# Patient Record
Sex: MALE | Race: WHITE | NOT HISPANIC OR LATINO | Employment: FULL TIME | URBAN - METROPOLITAN AREA
[De-identification: names, ages, dates, MRNs, and addresses within clinical notes are randomized per-mention and may not be internally consistent; named-entity substitution may affect disease eponyms.]

---

## 2017-01-18 ENCOUNTER — OFFICE VISIT (OUTPATIENT)
Dept: MEDICAL GROUP | Facility: MEDICAL CENTER | Age: 42
End: 2017-01-18
Payer: COMMERCIAL

## 2017-01-18 VITALS
DIASTOLIC BLOOD PRESSURE: 90 MMHG | BODY MASS INDEX: 30.78 KG/M2 | OXYGEN SATURATION: 97 % | HEART RATE: 89 BPM | WEIGHT: 215 LBS | TEMPERATURE: 96.6 F | HEIGHT: 70 IN | SYSTOLIC BLOOD PRESSURE: 140 MMHG

## 2017-01-18 DIAGNOSIS — I10 ESSENTIAL HYPERTENSION: ICD-10-CM

## 2017-01-18 DIAGNOSIS — J01.80 OTHER ACUTE SINUSITIS: ICD-10-CM

## 2017-01-18 PROCEDURE — 99214 OFFICE O/P EST MOD 30 MIN: CPT | Performed by: NURSE PRACTITIONER

## 2017-01-18 RX ORDER — LISINOPRIL 10 MG/1
10 TABLET ORAL DAILY
Qty: 30 TAB | Refills: 1 | Status: SHIPPED | OUTPATIENT
Start: 2017-01-18 | End: 2017-04-20

## 2017-01-18 RX ORDER — AMOXICILLIN AND CLAVULANATE POTASSIUM 875; 125 MG/1; MG/1
1 TABLET, FILM COATED ORAL 2 TIMES DAILY
Qty: 20 TAB | Refills: 0 | Status: SHIPPED | OUTPATIENT
Start: 2017-01-18 | End: 2017-02-14

## 2017-01-18 NOTE — PROGRESS NOTES
Subjective:      Ramsey Allen is a 41 y.o. male who presents with HTN          HPI  Seen in f/u for HTN.  He had his wisdom teeth out 12 days ago.  Before that he had a sinusitis.  Took amoxicillin x 5 days.  Didn't help.  He is still having yellow green secretions.  + sinus congestion.  More sweating but not fever.  + headache.  noow going to chest congestion.  + coughing.  Waking up with dry sinuses.  Still sore throat that is sl better.    His bp is still elevated.  He checks it at store and its higher.    He was working on diet and exercise well until the last month.  Then had lots of stress with illness and family deaths.      Patient Active Problem List    Diagnosis Date Noted   • Obesity (BMI 30-39.9) 12/14/2016   • Left-sided tinnitus 11/11/2016   • Bilateral headache 07/13/2015   • Viral syndrome 07/01/2015   • Vitamin d deficiency 03/20/2012   • Preventative health care 09/21/2011   • Recurrent sinusitis    • Post traumatic stress disorder (PTSD)    • Low back pain 07/08/2010   • HTN (hypertension) 04/28/2010   • Anxiety 10/21/2009   • Erectile dysfunction 08/04/2009     Current Outpatient Prescriptions   Medication Sig Dispense Refill   • vitamin D, Ergocalciferol, (DRISDOL) 93996 UNITS Cap capsule Take 1 Cap by mouth every 7 days. 12 Cap 0   • sertraline (ZOLOFT) 50 MG Tab Take 1 Tab by mouth every day. 90 Tab 1   • fluticasone (FLONASE) 50 MCG/ACT nasal spray Spray 1 Spray in nose every day. 16 g 11   • Loratadine (CLARITIN) 10 MG CAPS Take  by mouth.       No current facility-administered medications for this visit.     Allergies   Allergen Reactions   • Erythromycin    • Ilosone [Erythromycin Estolate]        ROS    Review of Systems   Constitutional: Negative.  Negative for fever, chills, weight loss, malaise/fatigue and diaphoresis.   HENT: Negative.  Negative for hearing loss, ear pain, nosebleeds, congestion, sore throat, neck pain, tinnitus and ear discharge.    Respiratory: Negative.   "Negative for cough, hemoptysis, sputum production, shortness of breath, wheezing and stridor.    Cardiovascular: Negative.  Negative for chest pain, palpitations, orthopnea, claudication, leg swelling and PND.   Gastrointestinal: denies nausea, vomiting, diarrhea, constipation, heartburn, melena or hematochezia.  Genitourinary: Denies dysuria, hematuria, urinary incontinence, frequency or urgency.    Musculoskeletal: Negative.  Negative for myalgias and back pain.   Neurological: Negative.  Negative for dizziness, tingling, tremors, weakness and headaches.   Psych:  Denies depression, anxiety or insomnia.  All other systems reviewed and are negative.         Objective:     /90 mmHg  Pulse 89  Temp(Src) 35.9 °C (96.6 °F)  Ht 1.778 m (5' 10\")  Wt 97.523 kg (215 lb)  BMI 30.85 kg/m2  SpO2 97%     Physical Exam  Physical Exam   Vitals reviewed.  Constitutional: oriented to person, place, and time. appears well-developed and well-nourished. No distress.   HENT:  Head: Normocephalic and atraumatic. Right Ear: External ear normal. Left Ear: External ear normal. Nose: Nose normal. Mouth/Throat: Oropharynx is clear and moist. No oropharyngeal exudate.  otoniel tm wnl.  Eyes: Right eye exhibits no discharge. Left eye exhibits no discharge. No scleral icterus.  Neck: No JVD present.  Cardiovascular: Normal rate, regular rhythm, normal heart sounds and intact distal pulses.  Exam reveals no gallop and no friction rub.  No murmur heard.  No carotid bruits.   Pulmonary/Chest: Effort normal and breath sounds normal. No stridor. No respiratory distress. no wheezes or rales. exhibits no tenderness.   Musculoskeletal: Normal range of motion. exhibits no edema. otoniel pedal pulses 2+.  Lymphadenopathy: no cervical or supraclavicular adenopathy.   Neurological: alert and oriented to person, place, and time. exhibits normal muscle tone. Coordination normal.   Skin: Skin is warm and dry. no diaphoresis.   Psychiatric: normal mood " and affect. behavior is normal.               Assessment/Plan:     1. Essential hypertension  lisinopril (PRINIVIL) 10 MG Tab    start lisinopril 10 mg i po daily.  f/u 1 month for bp check   2. Other acute sinusitis  amoxicillin-clavulanate (AUGMENTIN) 875-125 MG Tab    augmentin x 10 days.  zyrtec and flonase daily.     3. BMI 30.0-30.9,adult  Patient identified as having weight management issue.  Appropriate orders and counseling given.

## 2017-01-18 NOTE — MR AVS SNAPSHOT
"        Ramsey Allen   2017 1:30 PM   Office Visit   MRN: [id number]    Department:  [facility]   Dept Phone:  [phone]    Description:  Male : 1975   Provider:  DAVID Quezada           Allergies as of 2017     Allergen Noted Reactions    Erythromycin 2008       Ilosone [Erythromycin Estolate] 2008         You were diagnosed with     Essential hypertension   [2665438]   start lisinopril 10 mg i po daily.  f/u 1 month for bp check    Other acute sinusitis   [461.8.ICD-9-CM]   augmentin x 10 days.  zyrtec and flonase daily.      BMI 30.0-30.9,adult   [877988]         Vital Signs     Blood Pressure Pulse Temperature Height Weight Body Mass Index    140/90 mmHg 89 35.9 °C (96.6 °F) 1.778 m (5' 10\") 97.523 kg (215 lb) 30.85 kg/m2    Oxygen Saturation Smoking Status                97% Former Smoker          Basic Information     Date Of Birth Sex Race Ethnicity Preferred Language    1975 Male White Non- English      Your appointments     2017  1:30 PM   Established Patient with DAVID Quezada   [facility])    [address]  [facility] [place] [address]   [phone]           You will be receiving a confirmation call a few days before your appointment from our automated call confirmation system.              Problem List              ICD-10-CM Priority Class Noted - Resolved    Erectile dysfunction N52.9   2009 - Present    Anxiety F41.9   10/21/2009 - Present    HTN (hypertension) I10   2010 - Present    Low back pain M54.5   2010 - Present    Recurrent sinusitis J32.9   Unknown - Present    Post traumatic stress disorder (PTSD) F43.10   Unknown - Present    Preventative health care Z00.00   2011 - Present    Vitamin d deficiency    3/20/2012 - Present    Viral syndrome B34.9   2015 - Present    Bilateral headache R51   2015 - Present    Left-sided tinnitus " H93.12   11/11/2016 - Present    Obesity (BMI 30-39.9) E66.9   12/14/2016 - Present      Health Maintenance        Date Due Completion Dates    IMM DTaP/Tdap/Td Vaccine (1 - Tdap) 4/25/1996 ---    IMM INFLUENZA (1) 9/1/2016 ---            Current Immunizations     No immunizations on file.      Below and/or attached are the medications your provider expects you to take. Review all of your home medications and newly ordered medications with your provider and/or pharmacist. Follow medication instructions as directed by your provider and/or pharmacist. Please keep your medication list with you and share with your provider. Update the information when medications are discontinued, doses are changed, or new medications (including over-the-counter products) are added; and carry medication information at all times in the event of emergency situations     Allergies:  ERYTHROMYCIN - (reactions not documented)     ILOSONE - (reactions not documented)               Medications  Valid as of: January 18, 2017 -  1:44 PM    Generic Name Brand Name Tablet Size Instructions for use    Amoxicillin-Pot Clavulanate (Tab) AUGMENTIN 875-125 MG Take 1 Tab by mouth 2 times a day.        Ergocalciferol (Cap) DRISDOL 06571 UNITS Take 1 Cap by mouth every 7 days.        Fluticasone Propionate (Suspension) FLONASE 50 MCG/ACT Spray 1 Spray in nose every day.        Lisinopril (Tab) PRINIVIL 10 MG Take 1 Tab by mouth every day.        Loratadine (Cap) Loratadine 10 MG Take  by mouth.        Sertraline HCl (Tab) ZOLOFT 50 MG Take 1 Tab by mouth every day.        .                 Medicines prescribed today were sent to:     [facility]/PHARMACY #[id number] - [place] - [address] [address] [facility] [address]    Phone: [phone] Fax: [phone]    Open 24 Hours?: No      Medication refill instructions:       If your prescription bottle indicates you have medication refills left, it is not necessary to call your provider’s office. Please  contact your pharmacy and they will refill your medication.    If your prescription bottle indicates you do not have any refills left, you may request refills at any time through one of the following ways: The online [employer] system (except Urgent Care), by calling your provider’s office, or by asking your pharmacy to contact your provider’s office with a refill request. Medication refills are processed only during regular business hours and may not be available until the next business day. Your provider may request additional information or to have a follow-up visit with you prior to refilling your medication.   *Please Note: Medication refills are assigned a new Rx number when refilled electronically. Your pharmacy may indicate that no refills were authorized even though a new prescription for the same medication is available at the pharmacy. Please request the medicine by name with the pharmacy before contacting your provider for a refill.           [employer] Access Code: Activation code not generated  Current [employer] Status: Active

## 2017-02-14 ENCOUNTER — OFFICE VISIT (OUTPATIENT)
Dept: MEDICAL GROUP | Facility: MEDICAL CENTER | Age: 42
End: 2017-02-14
Payer: COMMERCIAL

## 2017-02-14 VITALS
WEIGHT: 215 LBS | SYSTOLIC BLOOD PRESSURE: 142 MMHG | TEMPERATURE: 98 F | DIASTOLIC BLOOD PRESSURE: 90 MMHG | BODY MASS INDEX: 30.78 KG/M2 | HEART RATE: 66 BPM | HEIGHT: 70 IN | OXYGEN SATURATION: 98 %

## 2017-02-14 DIAGNOSIS — I10 ESSENTIAL HYPERTENSION: ICD-10-CM

## 2017-02-14 DIAGNOSIS — F41.9 ANXIETY: ICD-10-CM

## 2017-02-14 DIAGNOSIS — K59.09 OTHER CONSTIPATION: ICD-10-CM

## 2017-02-14 PROCEDURE — 99213 OFFICE O/P EST LOW 20 MIN: CPT | Performed by: NURSE PRACTITIONER

## 2017-02-14 RX ORDER — LISINOPRIL 20 MG/1
20 TABLET ORAL DAILY
Qty: 30 TAB | Refills: 1 | Status: SHIPPED | OUTPATIENT
Start: 2017-02-14 | End: 2017-04-07 | Stop reason: SDUPTHER

## 2017-02-14 NOTE — PROGRESS NOTES
Subjective:      Ramsey Allen is a 41 y.o. male who presents with No chief complaint on file.            HPI  Seen in f/u for HTN.  He was started on the lisinopril last visit.  Doesn't check bp at home.  Bp today not imrpoved.  After starting the med he had 6 days of severe constipation.  Finally getting better but now having freq bm's.  bm's are normal.  No black tarry stools.  Headaches resolved with tx of sinusitis.    Otherwise feeling well.  He has had his wisdom teeth removed since last visit.   He is still doing well on zoloft.  Still has some anxiety with some situations.      Patient Active Problem List    Diagnosis Date Noted   • Obesity (BMI 30-39.9) 12/14/2016   • Left-sided tinnitus 11/11/2016   • Bilateral headache 07/13/2015   • Viral syndrome 07/01/2015   • Vitamin d deficiency 03/20/2012   • Preventative health care 09/21/2011   • Recurrent sinusitis    • Post traumatic stress disorder (PTSD)    • Low back pain 07/08/2010   • HTN (hypertension) 04/28/2010   • Anxiety 10/21/2009   • Erectile dysfunction 08/04/2009     Current Outpatient Prescriptions   Medication Sig Dispense Refill   • lisinopril (PRINIVIL) 10 MG Tab Take 1 Tab by mouth every day. 30 Tab 1   • sertraline (ZOLOFT) 50 MG Tab Take 1 Tab by mouth every day. 90 Tab 1   • Loratadine (CLARITIN) 10 MG CAPS Take  by mouth.       No current facility-administered medications for this visit.     Allergies   Allergen Reactions   • Erythromycin    • Ilosone [Erythromycin Estolate]          ROS  Review of Systems   Constitutional: Negative.  Negative for fever, chills, weight loss, malaise/fatigue and diaphoresis.   HENT: Negative.  Negative for hearing loss, ear pain, nosebleeds, congestion, sore throat, neck pain, tinnitus and ear discharge.    Respiratory: Negative.  Negative for cough, hemoptysis, sputum production, shortness of breath, wheezing and stridor.    Cardiovascular: Negative.  Negative for chest pain, palpitations,  "orthopnea, claudication, leg swelling and PND.   Gastrointestinal: denies nausea, vomiting, diarrhea, heartburn, melena or hematochezia.  Genitourinary: Denies dysuria, hematuria, urinary incontinence, frequency or urgency.             Objective:     /90 mmHg  Pulse 66  Temp(Src) 36.7 °C (98 °F)  Ht 1.778 m (5' 10\")  Wt 97.523 kg (215 lb)  BMI 30.85 kg/m2  SpO2 98%     Physical Exam      Physical Exam   Vitals reviewed.  Constitutional: oriented to person, place, and time. appears well-developed and well-nourished. No distress.   Cardiovascular: Normal rate, regular rhythm, normal heart sounds and intact distal pulses.  Exam reveals no gallop and no friction rub.  No murmur heard.  No carotid bruits.   Pulmonary/Chest: Effort normal and breath sounds normal. No stridor. No respiratory distress. no wheezes or rales. exhibits no tenderness.   Musculoskeletal: Normal range of motion. exhibits no edema. otoniel pedal pulses 2+.  Abd:  No CVAT,  Soft,  Bs noted in all quadrants.  No HSM.  No abdominal tenderness.  Neurological: alert and oriented to person, place, and time. exhibits normal muscle tone. Coordination normal.   Skin: Skin is warm and dry. no diaphoresis.   Psychiatric: normal mood and affect. behavior is normal.            Assessment/Plan:     1. Essential hypertension  lisinopril (PRINIVIL) 20 MG Tab    still not controlled.  inc lisinopril to 20 mg daily.  f/u 1 mnoth for bp check   2. Anxiety      stable on zoloft.  will consider inc to 75 mg if not imrpoving   3. Other constipation      poss r/t lisinopril.  if reoccurs or worsens will change to losartan       "

## 2017-02-14 NOTE — MR AVS SNAPSHOT
"        Ramsey Allen   2017 1:30 PM   Office Visit   MRN: [id number]    Department:  [facility]   Dept Phone:  [phone]    Description:  Male : 1975   Provider:  DAVID Quezada           Allergies as of 2017     Allergen Noted Reactions    Erythromycin 2008       Ilosone [Erythromycin Estolate] 2008         You were diagnosed with     Essential hypertension   [5935536]   still not controlled.  inc lisinopril to 20 mg daily.  f/u 1 mnoth for bp check    Anxiety   [999770]   stable on zoloft.  will consider inc to 75 mg if not imrpoving    Other constipation   [564.09.ICD-9-CM]   poss r/t lisinopril.  if reoccurs or worsens will change to losartan      Vital Signs     Blood Pressure Pulse Temperature Height Weight Body Mass Index    142/90 mmHg 66 36.7 °C (98 °F) 1.778 m (5' 10\") 97.523 kg (215 lb) 30.85 kg/m2    Oxygen Saturation Smoking Status                98% Former Smoker          Basic Information     Date Of Birth Sex Race Ethnicity Preferred Language    1975 Male White Non- English      Problem List              ICD-10-CM Priority Class Noted - Resolved    Erectile dysfunction N52.9   2009 - Present    Anxiety F41.9   10/21/2009 - Present    HTN (hypertension) I10   2010 - Present    Low back pain M54.5   2010 - Present    Recurrent sinusitis J32.9   Unknown - Present    Post traumatic stress disorder (PTSD) F43.10   Unknown - Present    Preventative health care Z00.00   2011 - Present    Vitamin d deficiency    3/20/2012 - Present    Viral syndrome B34.9   2015 - Present    Bilateral headache R51   2015 - Present    Left-sided tinnitus H93.12   2016 - Present    Obesity (BMI 30-39.9) E66.9   2016 - Present      Health Maintenance        Date Due Completion Dates    IMM DTaP/Tdap/Td Vaccine (1 - Tdap) 1996 ---    IMM INFLUENZA (1) 2016 ---            Current Immunizations     No " immunizations on file.      Below and/or attached are the medications your provider expects you to take. Review all of your home medications and newly ordered medications with your provider and/or pharmacist. Follow medication instructions as directed by your provider and/or pharmacist. Please keep your medication list with you and share with your provider. Update the information when medications are discontinued, doses are changed, or new medications (including over-the-counter products) are added; and carry medication information at all times in the event of emergency situations     Allergies:  ERYTHROMYCIN - (reactions not documented)     ILOSONE - (reactions not documented)               Medications  Valid as of: February 14, 2017 -  1:54 PM    Generic Name Brand Name Tablet Size Instructions for use    Lisinopril (Tab) PRINIVIL 10 MG Take 1 Tab by mouth every day.        Lisinopril (Tab) PRINIVIL 20 MG Take 1 Tab by mouth every day.        Loratadine (Cap) Loratadine 10 MG Take  by mouth.        Sertraline HCl (Tab) ZOLOFT 50 MG Take 1 Tab by mouth every day.        .                 Medicines prescribed today were sent to:     [facility]/PHARMACY #[id number] - [place], [place] - [address] [facility] [address]    Phone: [phone] Fax: [phone]    Open 24 Hours?: No      Medication refill instructions:       If your prescription bottle indicates you have medication refills left, it is not necessary to call your provider’s office. Please contact your pharmacy and they will refill your medication.    If your prescription bottle indicates you do not have any refills left, you may request refills at any time through one of the following ways: The online [employer] system (except Urgent Care), by calling your provider’s office, or by asking your pharmacy to contact your provider’s office with a refill request. Medication refills are processed only during regular business hours and may not be available until the  next business day. Your provider may request additional information or to have a follow-up visit with you prior to refilling your medication.   *Please Note: Medication refills are assigned a new Rx number when refilled electronically. Your pharmacy may indicate that no refills were authorized even though a new prescription for the same medication is available at the pharmacy. Please request the medicine by name with the pharmacy before contacting your provider for a refill.           [employer] Access Code: Activation code not generated  Current [employer] Status: Active

## 2017-03-06 RX ORDER — ERGOCALCIFEROL 1.25 MG/1
CAPSULE ORAL
Qty: 12 CAP | Refills: 0 | Status: SHIPPED | OUTPATIENT
Start: 2017-03-06 | End: 2017-05-30 | Stop reason: SDUPTHER

## 2017-04-07 RX ORDER — LISINOPRIL 20 MG/1
TABLET ORAL
Qty: 30 TAB | Refills: 0 | Status: SHIPPED | OUTPATIENT
Start: 2017-04-07 | End: 2017-04-20 | Stop reason: SDUPTHER

## 2017-04-13 ENCOUNTER — OFFICE VISIT (OUTPATIENT)
Dept: URGENT CARE | Facility: PHYSICIAN GROUP | Age: 42
End: 2017-04-13
Payer: COMMERCIAL

## 2017-04-13 VITALS
OXYGEN SATURATION: 98 % | SYSTOLIC BLOOD PRESSURE: 122 MMHG | TEMPERATURE: 97.2 F | DIASTOLIC BLOOD PRESSURE: 80 MMHG | RESPIRATION RATE: 14 BRPM | BODY MASS INDEX: 30.06 KG/M2 | WEIGHT: 210 LBS | HEIGHT: 70 IN | HEART RATE: 88 BPM

## 2017-04-13 DIAGNOSIS — J01.00 ACUTE NON-RECURRENT MAXILLARY SINUSITIS: ICD-10-CM

## 2017-04-13 DIAGNOSIS — R05.9 COUGH: ICD-10-CM

## 2017-04-13 PROCEDURE — 99214 OFFICE O/P EST MOD 30 MIN: CPT | Performed by: PHYSICIAN ASSISTANT

## 2017-04-13 RX ORDER — AMOXICILLIN AND CLAVULANATE POTASSIUM 875; 125 MG/1; MG/1
1 TABLET, FILM COATED ORAL 2 TIMES DAILY
Qty: 14 TAB | Refills: 0 | Status: SHIPPED | OUTPATIENT
Start: 2017-04-13 | End: 2017-04-20

## 2017-04-13 RX ORDER — BENZONATATE 100 MG/1
100 CAPSULE ORAL 3 TIMES DAILY PRN
Qty: 60 CAP | Refills: 0 | Status: SHIPPED | OUTPATIENT
Start: 2017-04-13 | End: 2017-04-20

## 2017-04-13 ASSESSMENT — ENCOUNTER SYMPTOMS
DIARRHEA: 0
SHORTNESS OF BREATH: 0
NAUSEA: 0
SORE THROAT: 1
SWOLLEN GLANDS: 0
CHILLS: 0
MUSCULOSKELETAL NEGATIVE: 1
ABDOMINAL PAIN: 0
FEVER: 0
HEADACHES: 1
DIZZINESS: 0
SPUTUM PRODUCTION: 0
SINUS PAIN: 1
COUGH: 1
VOMITING: 0

## 2017-04-13 NOTE — MR AVS SNAPSHOT
"        Ramsey Allen   2017 1:05 PM   Office Visit   MRN: [id number]    Department:  [facility]   Dept Phone:  [phone]    Description:  Male : 1975   Provider:  Cherrie Mancia PA-C           Reason for Visit     URI URI, sore throat, congestion, cough, x 8 days.      Allergies as of 2017     Allergen Noted Reactions    Erythromycin 2008       Ilosone [Erythromycin Estolate] 2008         You were diagnosed with     Acute non-recurrent maxillary sinusitis   [3193275]       Cough   [786.2.ICD-9-CM]         Vital Signs     Blood Pressure Pulse Temperature Respirations Height Weight    122/80 mmHg 88 36.2 °C (97.2 °F) 14 1.778 m (5' 10\") 95.255 kg (210 lb)    Body Mass Index Oxygen Saturation Smoking Status             30.13 kg/m2 98% Former Smoker         Basic Information     Date Of Birth Sex Race Ethnicity Preferred Language    1975 Male White Non- English      Problem List              ICD-10-CM Priority Class Noted - Resolved    Erectile dysfunction N52.9   2009 - Present    Anxiety F41.9   10/21/2009 - Present    HTN (hypertension) I10   2010 - Present    Low back pain M54.5   2010 - Present    Recurrent sinusitis J32.9   Unknown - Present    Post traumatic stress disorder (PTSD) F43.10   Unknown - Present    Preventative health care Z00.00   2011 - Present    Vitamin d deficiency    3/20/2012 - Present    Viral syndrome B34.9   2015 - Present    Bilateral headache R51   2015 - Present    Left-sided tinnitus H93.12   2016 - Present    Obesity (BMI 30-39.9) E66.9   2016 - Present      Health Maintenance        Date Due Completion Dates    IMM DTaP/Tdap/Td Vaccine (1 - Tdap) 1996 ---            Current Immunizations     No immunizations on file.      Below and/or attached are the medications your provider expects you to take. Review all of your home medications and newly ordered medications with your provider " and/or pharmacist. Follow medication instructions as directed by your provider and/or pharmacist. Please keep your medication list with you and share with your provider. Update the information when medications are discontinued, doses are changed, or new medications (including over-the-counter products) are added; and carry medication information at all times in the event of emergency situations     Allergies:  ERYTHROMYCIN - (reactions not documented)     ILOSONE - (reactions not documented)               Medications  Valid as of: April 13, 2017 -  1:42 PM    Generic Name Brand Name Tablet Size Instructions for use    Amoxicillin-Pot Clavulanate (Tab) AUGMENTIN 875-125 MG Take 1 Tab by mouth 2 times a day for 7 days.        Benzonatate (Cap) TESSALON 100 MG Take 1 Cap by mouth 3 times a day as needed for Cough.        Ergocalciferol (Cap) DRISDOL 53829 UNITS TAKE 1 CAP BY MOUTH EVERY 7 DAYS.        Lisinopril (Tab) PRINIVIL 10 MG Take 1 Tab by mouth every day.        Lisinopril (Tab) PRINIVIL 20 MG TAKE 1 TAB BY MOUTH EVERY DAY.        Loratadine (Cap) Loratadine 10 MG Take  by mouth.        Sertraline HCl (Tab) ZOLOFT 50 MG Take 1 Tab by mouth every day.        .                 Medicines prescribed today were sent to:     [facility]/PHARMACY #[id number] - [place], [place] - [address] [address]    Phone: [phone] Fax: [phone]    Open 24 Hours?: No      Medication refill instructions:       If your prescription bottle indicates you have medication refills left, it is not necessary to call your provider’s office. Please contact your pharmacy and they will refill your medication.    If your prescription bottle indicates you do not have any refills left, you may request refills at any time through one of the following ways: The online [email or web address] system (except Urgent Care), by calling your provider’s office, or by asking your pharmacy to contact your provider’s office with a refill request. Medication  refills are processed only during regular business hours and may not be available until the next business day. Your provider may request additional information or to have a follow-up visit with you prior to refilling your medication.   *Please Note: Medication refills are assigned a new Rx number when refilled electronically. Your pharmacy may indicate that no refills were authorized even though a new prescription for the same medication is available at the pharmacy. Please request the medicine by name with the pharmacy before contacting your provider for a refill.           [employer] Access Code: Activation code not generated  Current [employer] Status: Active

## 2017-04-13 NOTE — PROGRESS NOTES
"Subjective:      Ramsey Allen is a 41 y.o. male who presents with URI            URI   This is a new problem. The current episode started 1 to 4 weeks ago (8 days). The problem has been unchanged. There has been no fever. Associated symptoms include congestion, coughing, headaches, sinus pain and a sore throat. Pertinent negatives include no abdominal pain, chest pain, diarrhea, ear pain, nausea, swollen glands or vomiting. Treatments tried: OTC cough medicine. The treatment provided no relief.   Patient states he is prone to sinus infections, and gets one 3-4 times per year. He has seen an ENT in the past and had sinus surgery, which helped for about a year but then he started having frequent sinus infections again.     Review of Systems   Constitutional: Negative for fever and chills.   HENT: Positive for congestion and sore throat. Negative for ear pain.    Respiratory: Positive for cough. Negative for sputum production and shortness of breath.    Cardiovascular: Negative for chest pain.   Gastrointestinal: Negative for nausea, vomiting, abdominal pain and diarrhea.   Genitourinary: Negative.    Musculoskeletal: Negative.    Neurological: Positive for headaches. Negative for dizziness.          Objective:     /80 mmHg  Pulse 88  Temp(Src) 36.2 °C (97.2 °F)  Resp 14  Ht 1.778 m (5' 10\")  Wt 95.255 kg (210 lb)  BMI 30.13 kg/m2  SpO2 98%     Physical Exam   Constitutional: He is oriented to person, place, and time. He appears well-developed and well-nourished. No distress.   HENT:   Head: Normocephalic and atraumatic.   Right Ear: Hearing, tympanic membrane, external ear and ear canal normal.   Left Ear: Hearing, tympanic membrane, external ear and ear canal normal.   Nose: Mucosal edema and rhinorrhea present. Right sinus exhibits maxillary sinus tenderness. Left sinus exhibits maxillary sinus tenderness.   Mouth/Throat: Posterior oropharyngeal erythema present. No oropharyngeal exudate. "   Mild posterior erythema with enlarged tonsils bilaterally. No exudate noted.    Eyes: Conjunctivae are normal. Pupils are equal, round, and reactive to light. Right eye exhibits no discharge. Left eye exhibits no discharge.   Neck: Normal range of motion.   Cardiovascular: Normal rate, regular rhythm and normal heart sounds.  Exam reveals no friction rub.    No murmur heard.  Pulmonary/Chest: Effort normal and breath sounds normal. No respiratory distress. He has no wheezes.   Musculoskeletal: Normal range of motion.   Lymphadenopathy:     He has no cervical adenopathy.   Neurological: He is alert and oriented to person, place, and time.   Skin: Skin is warm and dry. He is not diaphoretic.   Psychiatric: He has a normal mood and affect. His behavior is normal.   Nursing note and vitals reviewed.         PMH:  has a past medical history of Post traumatic stress disorder (PTSD); Recurrent sinusitis; ED (erectile dysfunction); Anxiety; Hypertension; LBP (low back pain); and Meningitis.  MEDS:   Current outpatient prescriptions:   •  amoxicillin-clavulanate (AUGMENTIN) 875-125 MG Tab, Take 1 Tab by mouth 2 times a day for 7 days., Disp: 14 Tab, Rfl: 0  •  benzonatate (TESSALON) 100 MG Cap, Take 1 Cap by mouth 3 times a day as needed for Cough., Disp: 60 Cap, Rfl: 0  •  lisinopril (PRINIVIL) 20 MG Tab, TAKE 1 TAB BY MOUTH EVERY DAY., Disp: 30 Tab, Rfl: 0  •  vitamin D, Ergocalciferol, (DRISDOL) 52115 UNITS Cap capsule, TAKE 1 CAP BY MOUTH EVERY 7 DAYS., Disp: 12 Cap, Rfl: 0  •  sertraline (ZOLOFT) 50 MG Tab, Take 1 Tab by mouth every day., Disp: 90 Tab, Rfl: 1  •  Loratadine (CLARITIN) 10 MG CAPS, Take  by mouth., Disp: , Rfl:   •  lisinopril (PRINIVIL) 10 MG Tab, Take 1 Tab by mouth every day., Disp: 30 Tab, Rfl: 1  ALLERGIES:   Allergies   Allergen Reactions   • Erythromycin    • Ilosone [Erythromycin Estolate]      SURGHX:   Past Surgical History   Procedure Laterality Date   • Septoturbinoplasty  8/22/08      Performed by LEIDA SOLOMON at [facility]ERY [facility]   • Antrostomy  8/22/08     Performed by LEIDA SOLOMON at SURGERY [facility]   • Sinuscopy  8/22/08     Performed by LEIDA SOLOMON at [facility]     SOCHX:  reports that he quit smoking about 6 years ago. His smoking use included Cigarettes. He quit after 10 years of use. He has quit using smokeless tobacco. His smokeless tobacco use included Chew. He reports that he drinks alcohol. He reports that he does not use illicit drugs.  FH: family history is negative for Diabetes, Heart Disease, Hypertension, and Psychiatry.       Assessment/Plan:     1. Acute non-recurrent maxillary sinusitis  - amoxicillin-clavulanate (AUGMENTIN) 875-125 MG Tab; Take 1 Tab by mouth 2 times a day for 7 days.  Dispense: 14 Tab; Refill: 0   - Complete full course of antibiotics as prescribed   - Discussed use of nedi-pot, humidifier, and Flonase nasal spray for symptomatic relief  - Encouraged to follow up with ENT due to frequency of sinus infections      2. Cough  - benzonatate (TESSALON) 100 MG Cap; Take 1 Cap by mouth 3 times a day as needed for Cough.  Dispense: 60 Cap; Refill: 0      Call or return to office if symptoms persist or worsen

## 2017-04-20 ENCOUNTER — OFFICE VISIT (OUTPATIENT)
Dept: MEDICAL GROUP | Facility: MEDICAL CENTER | Age: 42
End: 2017-04-20
Payer: COMMERCIAL

## 2017-04-20 VITALS
OXYGEN SATURATION: 98 % | WEIGHT: 218 LBS | BODY MASS INDEX: 31.21 KG/M2 | DIASTOLIC BLOOD PRESSURE: 86 MMHG | HEIGHT: 70 IN | HEART RATE: 81 BPM | TEMPERATURE: 97.2 F | SYSTOLIC BLOOD PRESSURE: 118 MMHG

## 2017-04-20 DIAGNOSIS — I10 ESSENTIAL HYPERTENSION: ICD-10-CM

## 2017-04-20 DIAGNOSIS — H93.12 LEFT-SIDED TINNITUS: ICD-10-CM

## 2017-04-20 DIAGNOSIS — F41.9 ANXIETY: ICD-10-CM

## 2017-04-20 DIAGNOSIS — N52.8 OTHER MALE ERECTILE DYSFUNCTION: ICD-10-CM

## 2017-04-20 DIAGNOSIS — E55.9 VITAMIN D DEFICIENCY: ICD-10-CM

## 2017-04-20 DIAGNOSIS — Z86.39 HISTORY OF HYPOGONADISM: ICD-10-CM

## 2017-04-20 DIAGNOSIS — D72.819 LEUKOPENIA, UNSPECIFIED TYPE: ICD-10-CM

## 2017-04-20 PROCEDURE — 99214 OFFICE O/P EST MOD 30 MIN: CPT | Performed by: NURSE PRACTITIONER

## 2017-04-20 RX ORDER — LISINOPRIL 20 MG/1
20 TABLET ORAL
Qty: 90 TAB | Refills: 2 | Status: SHIPPED | OUTPATIENT
Start: 2017-04-20 | End: 2018-05-17 | Stop reason: SDUPTHER

## 2017-04-20 NOTE — PROGRESS NOTES
Subjective:      Ramsey Allen is a 41 y.o. male who presents with No chief complaint on file.            HPI  Seen in fu for HTN.  His bp is not controlled.  Was seen in UC last week for URI.  His lisinorpil was inc to 20 mg last visit.  Bp at home is also good.  No headache except for URI.  His anxiety is also controlled.  He has gained some wt.  Hiking and exercising regularly.  Is on zoloftl.  He is stil;l having the left sided tinnitus.  Was referred in dec to ENT but wanted to wait until bp control.  Now still having sx.    In dec his d was low at 8.  Took ergocalciferol.    His cbc also showed sl low wbc.   He has had low testosterone in the past. Was on med for awhile.  Having some ED    Patient Active Problem List    Diagnosis Date Noted   • Obesity (BMI 30-39.9) 12/14/2016   • Left-sided tinnitus 11/11/2016   • Vitamin d deficiency 03/20/2012   • Preventative health care 09/21/2011   • Recurrent sinusitis    • Post traumatic stress disorder (PTSD)    • Low back pain 07/08/2010   • HTN (hypertension) 04/28/2010   • Anxiety 10/21/2009   • Erectile dysfunction 08/04/2009     Current Outpatient Prescriptions   Medication Sig Dispense Refill   • lisinopril (PRINIVIL) 20 MG Tab TAKE 1 TAB BY MOUTH EVERY DAY. 30 Tab 0   • vitamin D, Ergocalciferol, (DRISDOL) 23186 UNITS Cap capsule TAKE 1 CAP BY MOUTH EVERY 7 DAYS. 12 Cap 0   • sertraline (ZOLOFT) 50 MG Tab Take 1 Tab by mouth every day. 90 Tab 1   • Loratadine (CLARITIN) 10 MG CAPS Take  by mouth.       No current facility-administered medications for this visit.     Allergies   Allergen Reactions   • Erythromycin    • Ilosone [Erythromycin Estolate]          ROS  Review of Systems   Constitutional: Negative.  Negative for fever, chills, weight loss, malaise/fatigue and diaphoresis.   HENT: Negative.  Negative for hearing loss, ear pain, nosebleeds, congestion, sore throat, neck pain, tinnitus and ear discharge.    Respiratory: Negative.  Negative for  "cough, hemoptysis, sputum production, shortness of breath, wheezing and stridor.    Cardiovascular: Negative.  Negative for chest pain, palpitations, orthopnea, claudication, leg swelling and PND.   Gastrointestinal: denies nausea, vomiting, diarrhea, constipation, heartburn, melena or hematochezia.  Genitourinary: Denies dysuria, hematuria, urinary incontinence, frequency or urgency.           Objective:     /86 mmHg  Pulse 81  Temp(Src) 36.2 °C (97.2 °F)  Ht 1.778 m (5' 10\")  Wt 98.884 kg (218 lb)  BMI 31.28 kg/m2  SpO2 98%     Physical Exam  .Physical Exam   Vitals reviewed.  Constitutional: oriented to person, place, and time. appears well-developed and well-nourished. No distress.   HENT:  Head: Normocephalic and atraumatic. Right Ear: External ear normal. Left Ear: External ear normal. Nose: Nose normal. Mouth/Throat: Oropharynx is clear and moist. No oropharyngeal exudate.  otoniel tm wnl.  Eyes: Right eye exhibits no discharge. Left eye exhibits no discharge. No scleral icterus.  Neck: No JVD present.  Cardiovascular: Normal rate, regular rhythm, normal heart sounds and intact distal pulses.  Exam reveals no gallop and no friction rub.  No murmur heard.  No carotid bruits.   Pulmonary/Chest: Effort normal and breath sounds normal. No stridor. No respiratory distress. no wheezes or rales. exhibits no tenderness.   Musculoskeletal: Normal range of motion. exhibits no edema. otoniel pedal pulses 2+.  Lymphadenopathy: no cervical or supraclavicular adenopathy.   Neurological: alert and oriented to person, place, and time. exhibits normal muscle tone. Coordination normal.   Skin: Skin is warm and dry. no diaphoresis.   Psychiatric: normal mood and affect. behavior is normal.               Assessment/Plan:     1. Essential hypertension  lisinopril (PRINIVIL) 20 MG Tab    controlled and stable on meds.    2. Anxiety      stable on meds.  having some wt gain.  will monitor.  chg meds if continues   3. Left-sided " tinnitus  REFERRAL TO ENT    refer ENT   4. Leukopenia, unspecified type  CBC WITH DIFFERENTIAL    check lab with d, cbc and testosterone.  f/u with pt with all test results and 12/17 call for lab slip   5. Vitamin D deficiency  VITAMIN D,25 HYDROXY   6. Other male erectile dysfunction  TESTOSTERONE F&T MALE ADULT   7. History of hypogonadism  TESTOSTERONE F&T MALE ADULT

## 2017-06-08 ENCOUNTER — HOSPITAL ENCOUNTER (OUTPATIENT)
Dept: RADIOLOGY | Facility: MEDICAL CENTER | Age: 42
End: 2017-06-08
Attending: OTOLARYNGOLOGY
Payer: COMMERCIAL

## 2017-06-08 DIAGNOSIS — H93.12 LEFT-SIDED TINNITUS: ICD-10-CM

## 2017-06-08 PROCEDURE — 70553 MRI BRAIN STEM W/O & W/DYE: CPT

## 2017-06-08 PROCEDURE — 700117 HCHG RX CONTRAST REV CODE 255: Performed by: OTOLARYNGOLOGY

## 2017-06-08 PROCEDURE — A9579 GAD-BASE MR CONTRAST NOS,1ML: HCPCS | Performed by: OTOLARYNGOLOGY

## 2017-06-08 RX ADMIN — GADODIAMIDE 20 ML: 287 INJECTION INTRAVENOUS at 13:38

## 2017-07-10 DIAGNOSIS — F41.9 ANXIETY: ICD-10-CM

## 2017-08-01 ENCOUNTER — OFFICE VISIT (OUTPATIENT)
Dept: MEDICAL GROUP | Facility: MEDICAL CENTER | Age: 42
End: 2017-08-01
Payer: COMMERCIAL

## 2017-08-01 VITALS
OXYGEN SATURATION: 97 % | SYSTOLIC BLOOD PRESSURE: 138 MMHG | TEMPERATURE: 97.8 F | HEART RATE: 83 BPM | WEIGHT: 230 LBS | DIASTOLIC BLOOD PRESSURE: 92 MMHG | BODY MASS INDEX: 32.93 KG/M2 | HEIGHT: 70 IN

## 2017-08-01 DIAGNOSIS — I10 ESSENTIAL HYPERTENSION: ICD-10-CM

## 2017-08-01 DIAGNOSIS — F41.9 ANXIETY: ICD-10-CM

## 2017-08-01 DIAGNOSIS — F51.01 PRIMARY INSOMNIA: ICD-10-CM

## 2017-08-01 PROCEDURE — 99213 OFFICE O/P EST LOW 20 MIN: CPT | Performed by: NURSE PRACTITIONER

## 2017-08-01 NOTE — MR AVS SNAPSHOT
"        Ramsey Allen   2017 3:00 PM   Office Visit   MRN: [id number]    Department:  [facility]   Dept Phone:  [phone]    Description:  Male : 1975   Provider:  DAVID Quezada           Allergies as of 2017     Allergen Noted Reactions    Erythromycin 2008       Ilosone [Erythromycin Estolate] 2008         You were diagnosed with     Anxiety   [291900]   try marijuana gummies that he bought for sleep and anxiety.  if not helping, will try buspar.  monitor bp.  inc exercise.  improve healthy diet.     Primary insomnia   [546085]   f/u oct as planned.  do lab before appt.      Essential hypertension   [6326443]       BMI 33.0-33.9,adult   [427558]         Vital Signs     Blood Pressure Pulse Temperature Height Weight Body Mass Index    138/92 mmHg 83 36.6 °C (97.8 °F) 1.778 m (5' 10\") 104.327 kg (230 lb) 33.00 kg/m2    Oxygen Saturation Smoking Status                97% Former Smoker          Basic Information     Date Of Birth Sex Race Ethnicity Preferred Language    1975 Male White Non- English      Problem List              ICD-10-CM Priority Class Noted - Resolved    Erectile dysfunction N52.9   2009 - Present    Anxiety F41.9   10/21/2009 - Present    HTN (hypertension) I10   2010 - Present    Low back pain M54.5   2010 - Present    Recurrent sinusitis J32.9   Unknown - Present    Post traumatic stress disorder (PTSD) F43.10   Unknown - Present    Preventative health care Z00.00   2011 - Present    Vitamin d deficiency    3/20/2012 - Present    Left-sided tinnitus H93.12   2016 - Present    Obesity (BMI 30-39.9) E66.9   2016 - Present      Health Maintenance        Date Due Completion Dates    IMM DTaP/Tdap/Td Vaccine (1 - Tdap) 1996 ---    IMM INFLUENZA (1) 2017 ---            Current Immunizations     No immunizations on file.      Below and/or attached are the medications your provider expects you to " take. Review all of your home medications and newly ordered medications with your provider and/or pharmacist. Follow medication instructions as directed by your provider and/or pharmacist. Please keep your medication list with you and share with your provider. Update the information when medications are discontinued, doses are changed, or new medications (including over-the-counter products) are added; and carry medication information at all times in the event of emergency situations     Allergies:  ERYTHROMYCIN - (reactions not documented)     ILOSONE - (reactions not documented)               Medications  Valid as of: August 01, 2017 -  3:23 PM    Generic Name Brand Name Tablet Size Instructions for use    Ergocalciferol (Cap) DRISDOL 59251 UNITS Take 1 Cap by mouth every 7 days.        Lisinopril (Tab) PRINIVIL 20 MG Take 1 Tab by mouth every day. TAKE 1 TAB BY MOUTH EVERY DAY.        Loratadine (Cap) Loratadine 10 MG Take  by mouth.        Sertraline HCl (Tab) ZOLOFT 50 MG Take 1 Tab by mouth every day.        .                 Medicines prescribed today were sent to:     [id number] - [place], [place] - [address] [id number] [facility] [address]    Phone: [phone] Fax: [phone]    Open 24 Hours?: No      Medication refill instructions:       If your prescription bottle indicates you have medication refills left, it is not necessary to call your provider’s office. Please contact your pharmacy and they will refill your medication.    If your prescription bottle indicates you do not have any refills left, you may request refills at any time through one of the following ways: The online [employer] system (except Urgent Care), by calling your provider’s office, or by asking your pharmacy to contact your provider’s office with a refill request. Medication refills are processed only during regular business hours and may not be available until the next business day. Your provider may request additional  information or to have a follow-up visit with you prior to refilling your medication.   *Please Note: Medication refills are assigned a new Rx number when refilled electronically. Your pharmacy may indicate that no refills were authorized even though a new prescription for the same medication is available at the pharmacy. Please request the medicine by name with the pharmacy before contacting your provider for a refill.           [email or web address] Access Code: Activation code not generated  Current [employer] Status: Active

## 2017-08-01 NOTE — PROGRESS NOTES
Subjective:      Ramsey Allen is a 42 y.o. male who presents with No chief complaint on file.            HPI  Seen in f/u for HTN.  His DBP is elevated.  He r/t his anxiety.  He is on zoloft which helped.  It is not helping his sleep.  When he can't sleep he is eating inapprop and drinking more etoh.    Due to the unhealthy diet he has gained 20-30 lbs.    His friends told him to try marijuana.  He has gummy marijuana.  Hasn't tried yet.    His wt is limiting his activities.      Patient Active Problem List    Diagnosis Date Noted   • Obesity (BMI 30-39.9) 12/14/2016   • Left-sided tinnitus 11/11/2016   • Vitamin d deficiency 03/20/2012   • Preventative health care 09/21/2011   • Recurrent sinusitis    • Post traumatic stress disorder (PTSD)    • Low back pain 07/08/2010   • HTN (hypertension) 04/28/2010   • Anxiety 10/21/2009   • Erectile dysfunction 08/04/2009     Current Outpatient Prescriptions   Medication Sig Dispense Refill   • sertraline (ZOLOFT) 50 MG Tab Take 1 Tab by mouth every day. 90 Tab 1   • vitamin D, Ergocalciferol, (DRISDOL) 17339 UNITS Cap capsule Take 1 Cap by mouth every 7 days. 12 Cap 0   • lisinopril (PRINIVIL) 20 MG Tab Take 1 Tab by mouth every day. TAKE 1 TAB BY MOUTH EVERY DAY. 90 Tab 2   • Loratadine (CLARITIN) 10 MG CAPS Take  by mouth.       No current facility-administered medications for this visit.     Allergies   Allergen Reactions   • Erythromycin    • Ilosone [Erythromycin Estolate]        ROS    Review of Systems   Constitutional: Negative.  Negative for fever, chills, weight loss, malaise/fatigue and diaphoresis.   HENT: Negative.  Negative for hearing loss, ear pain, nosebleeds, congestion, sore throat, neck pain, tinnitus and ear discharge.    Respiratory: Negative.  Negative for cough, hemoptysis, sputum production, shortness of breath, wheezing and stridor.    Cardiovascular: Negative.  Negative for chest pain, palpitations, orthopnea, claudication, leg swelling  "and PND.   Gastrointestinal: denies nausea, vomiting, diarrhea, constipation, heartburn, melena or hematochezia.  Genitourinary: Denies dysuria, hematuria, urinary incontinence, frequency or urgency.         Objective:     /92 mmHg  Pulse 83  Temp(Src) 36.6 °C (97.8 °F)  Ht 1.778 m (5' 10\")  Wt 104.327 kg (230 lb)  BMI 33.00 kg/m2  SpO2 97%     Physical Exam      Physical Exam   Vitals reviewed.  Constitutional: oriented to person, place, and time. appears well-developed and well-nourished. No distress.   Cardiovascular: Normal rate, regular rhythm, normal heart sounds and intact distal pulses.  Exam reveals no gallop and no friction rub.  No murmur heard.  No carotid bruits.   Pulmonary/Chest: Effort normal and breath sounds normal. No stridor. No respiratory distress. no wheezes or rales. exhibits no tenderness.   Musculoskeletal: Normal range of motion. exhibits no edema. otoniel pedal pulses 2+.  Neurological: alert and oriented to person, place, and time. exhibits normal muscle tone. Coordination normal.   Skin: Skin is warm and dry. no diaphoresis.   Psychiatric: normal mood and affect. behavior is normal.            Assessment/Plan:     1. Anxiety      try marijuana gummies that he bought for sleep and anxiety.  if not helping, will try buspar.  monitor bp.  inc exercise.  improve healthy diet.    2. Primary insomnia      f/u oct as planned.  do lab before appt.     3. Essential hypertension     4. BMI 33.0-33.9,adult  Patient identified as having weight management issue.  Appropriate orders and counseling given.         "

## 2018-02-19 ENCOUNTER — OFFICE VISIT (OUTPATIENT)
Dept: URGENT CARE | Facility: PHYSICIAN GROUP | Age: 43
End: 2018-02-19
Payer: COMMERCIAL

## 2018-02-19 VITALS
HEART RATE: 88 BPM | RESPIRATION RATE: 12 BRPM | TEMPERATURE: 98.7 F | WEIGHT: 247 LBS | DIASTOLIC BLOOD PRESSURE: 78 MMHG | SYSTOLIC BLOOD PRESSURE: 142 MMHG | OXYGEN SATURATION: 97 % | HEIGHT: 70 IN | BODY MASS INDEX: 35.36 KG/M2

## 2018-02-19 DIAGNOSIS — J01.91 ACUTE RECURRENT SINUSITIS, UNSPECIFIED LOCATION: ICD-10-CM

## 2018-02-19 PROCEDURE — 99213 OFFICE O/P EST LOW 20 MIN: CPT | Performed by: EMERGENCY MEDICINE

## 2018-02-19 RX ORDER — AMOXICILLIN AND CLAVULANATE POTASSIUM 875; 125 MG/1; MG/1
1 TABLET, FILM COATED ORAL 2 TIMES DAILY
Qty: 20 TAB | Refills: 0 | Status: SHIPPED | OUTPATIENT
Start: 2018-02-19 | End: 2018-03-01

## 2018-02-19 NOTE — LETTER
February 19, 2018        Ramsey Allen  [address]  [facility] [address]        Dear Ramsey:    Please ask for today and tomorrow off from work for medical reason.    If you have any questions or concerns, please don't hesitate to call.        Sincerely,        Alberto Reed M.D.    Electronically Signed

## 2018-02-20 NOTE — PROGRESS NOTES
Subjective:      Ramsey Allen is a 42 y.o. male who presents with Sinus Problem (with chest congestion x 1 week )            HPI  Chronic sinus issues , has been going on for the past week initially with HA which has passed  Allergies   Allergen Reactions   • Erythromycin    • Ilosone [Erythromycin Estolate]      Social History     Social History   • Marital status:      Spouse name: N/A   • Number of children: N/A   • Years of education: N/A     Occupational History   • Not on file.     Social History Main Topics   • Smoking status: Former Smoker     Years: 10.00     Types: Cigarettes     Quit date: 7/13/2010   • Smokeless tobacco: Former User     Types: Chew      Comment: occationally  quit chewing 11 years ago   • Alcohol use 0.0 oz/week      Comment: socially   • Drug use: No   • Sexual activity: Not on file     Other Topics Concern   • Not on file     Social History Narrative   • No narrative on file     Past Medical History:   Diagnosis Date   • Anxiety    • ED (erectile dysfunction)    • Hypertension    • LBP (low back pain)    • Meningitis    • Post traumatic stress disorder (PTSD)    • Recurrent sinusitis      Current Outpatient Prescriptions on File Prior to Visit   Medication Sig Dispense Refill   • sertraline (ZOLOFT) 50 MG Tab Take 1 Tab by mouth every day. 90 Tab 1   • vitamin D, Ergocalciferol, (DRISDOL) 82257 UNITS Cap capsule Take 1 Cap by mouth every 7 days. 12 Cap 0   • lisinopril (PRINIVIL) 20 MG Tab Take 1 Tab by mouth every day. TAKE 1 TAB BY MOUTH EVERY DAY. 90 Tab 2   • Loratadine (CLARITIN) 10 MG CAPS Take  by mouth.       No current facility-administered medications on file prior to visit.      Family History   Problem Relation Age of Onset   • Diabetes Neg Hx    • Heart Disease Neg Hx    • Hypertension Neg Hx    • Psychiatry Neg Hx      Review of Systems   Constitutional: Negative for chills and fever.   HENT: Positive for congestion and sinus pain.    Eyes: Negative for  "discharge and redness.   Respiratory: Positive for cough.    Cardiovascular: Negative for chest pain and palpitations.   Gastrointestinal: Negative for abdominal pain, diarrhea, nausea and vomiting.   Genitourinary: Negative.    Musculoskeletal: Negative for myalgias and neck pain.   Skin: Negative for rash.   Neurological: Positive for headaches.          Objective:     /78   Pulse 88   Temp 37.1 °C (98.7 °F)   Resp 12   Ht 1.778 m (5' 10\")   Wt 112 kg (247 lb)   SpO2 97%   BMI 35.44 kg/m²      Physical Exam   Constitutional: He appears well-developed and well-nourished. He appears distressed.   HENT:   Head: Normocephalic and atraumatic.   Right Ear: External ear normal.   Left Ear: External ear normal.   Eyes: Conjunctivae are normal. Right eye exhibits no discharge. Left eye exhibits no discharge.   Neck: Normal range of motion. No tracheal deviation present.   Cardiovascular: Normal rate and regular rhythm.    Pulmonary/Chest: Effort normal and breath sounds normal. No stridor. No respiratory distress. He has no wheezes.   Abdominal: Soft. He exhibits no distension. There is no tenderness.   Musculoskeletal: Normal range of motion.   Neurological: He is alert. Coordination normal.   Skin: Skin is warm and dry. No rash noted. He is not diaphoretic. No erythema.   Psychiatric: He has a normal mood and affect. His behavior is normal.   Nursing note and vitals reviewed.              Assessment/Plan:     DX  Acute Sinusitis             HBP  142 /78    I am recommending the patient initiate/ continue hydration efforts including the use of a vaporizer/humidifier/ netti pot. I also recommend the pt, initiate Mucinex  Avoid decongestants and antihistamines.. In addition the patient will initiate the prescribed prescription medication/s: Augmentin 875/125. If the patient's condition exacerbates with worsening dysphagia, shortness of breath, uncontrolled fever, headache or chest pressure he/she will return " immediately to the urgent care or go to  the emergency department for further evaluation.  Note given for today and tomorrow.  Alberto Reed  .

## 2018-02-21 ASSESSMENT — ENCOUNTER SYMPTOMS
SINUS PAIN: 1
NECK PAIN: 0
CHILLS: 0
FEVER: 0
VOMITING: 0
PALPITATIONS: 0
COUGH: 1
DIARRHEA: 0
ABDOMINAL PAIN: 0
MYALGIAS: 0
HEADACHES: 1
EYE REDNESS: 0
EYE DISCHARGE: 0
NAUSEA: 0

## 2018-04-16 ENCOUNTER — HOSPITAL ENCOUNTER (OUTPATIENT)
Dept: LAB | Facility: MEDICAL CENTER | Age: 43
End: 2018-04-16
Attending: NURSE PRACTITIONER
Payer: COMMERCIAL

## 2018-04-16 DIAGNOSIS — E55.9 VITAMIN D DEFICIENCY: ICD-10-CM

## 2018-04-16 DIAGNOSIS — N52.8 OTHER MALE ERECTILE DYSFUNCTION: ICD-10-CM

## 2018-04-16 DIAGNOSIS — D72.819 LEUKOPENIA, UNSPECIFIED TYPE: ICD-10-CM

## 2018-04-16 DIAGNOSIS — Z86.39 HISTORY OF HYPOGONADISM: ICD-10-CM

## 2018-04-16 LAB
25(OH)D3 SERPL-MCNC: 30 NG/ML (ref 30–100)
BASOPHILS # BLD AUTO: 1.5 % (ref 0–1.8)
BASOPHILS # BLD: 0.07 K/UL (ref 0–0.12)
EOSINOPHIL # BLD AUTO: 0.15 K/UL (ref 0–0.51)
EOSINOPHIL NFR BLD: 3.3 % (ref 0–6.9)
ERYTHROCYTE [DISTWIDTH] IN BLOOD BY AUTOMATED COUNT: 40.5 FL (ref 35.9–50)
HCT VFR BLD AUTO: 45 % (ref 42–52)
HGB BLD-MCNC: 15.8 G/DL (ref 14–18)
IMM GRANULOCYTES # BLD AUTO: 0.01 K/UL (ref 0–0.11)
IMM GRANULOCYTES NFR BLD AUTO: 0.2 % (ref 0–0.9)
LYMPHOCYTES # BLD AUTO: 1.53 K/UL (ref 1–4.8)
LYMPHOCYTES NFR BLD: 33.4 % (ref 22–41)
MCH RBC QN AUTO: 30.8 PG (ref 27–33)
MCHC RBC AUTO-ENTMCNC: 35.1 G/DL (ref 33.7–35.3)
MCV RBC AUTO: 87.7 FL (ref 81.4–97.8)
MONOCYTES # BLD AUTO: 0.46 K/UL (ref 0–0.85)
MONOCYTES NFR BLD AUTO: 10 % (ref 0–13.4)
NEUTROPHILS # BLD AUTO: 2.36 K/UL (ref 1.82–7.42)
NEUTROPHILS NFR BLD: 51.6 % (ref 44–72)
NRBC # BLD AUTO: 0 K/UL
NRBC BLD-RTO: 0 /100 WBC
PLATELET # BLD AUTO: 221 K/UL (ref 164–446)
PMV BLD AUTO: 9.7 FL (ref 9–12.9)
RBC # BLD AUTO: 5.13 M/UL (ref 4.7–6.1)
WBC # BLD AUTO: 4.6 K/UL (ref 4.8–10.8)

## 2018-04-16 PROCEDURE — 84270 ASSAY OF SEX HORMONE GLOBUL: CPT

## 2018-04-16 PROCEDURE — 85025 COMPLETE CBC W/AUTO DIFF WBC: CPT

## 2018-04-16 PROCEDURE — 36415 COLL VENOUS BLD VENIPUNCTURE: CPT

## 2018-04-16 PROCEDURE — 82306 VITAMIN D 25 HYDROXY: CPT

## 2018-04-16 PROCEDURE — 84403 ASSAY OF TOTAL TESTOSTERONE: CPT

## 2018-04-17 ENCOUNTER — TELEPHONE (OUTPATIENT)
Dept: MEDICAL GROUP | Facility: MEDICAL CENTER | Age: 43
End: 2018-04-17

## 2018-04-17 DIAGNOSIS — E29.1 HYPOGONADISM IN MALE: ICD-10-CM

## 2018-04-17 LAB
SHBG SERPL-SCNC: 14 NMOL/L (ref 11–80)
TESTOST FREE MFR SERPL: 2.5 % (ref 1.6–2.9)
TESTOST FREE SERPL-MCNC: 54 PG/ML (ref 47–244)
TESTOST SERPL-MCNC: 216 NG/DL (ref 300–890)

## 2018-04-18 ENCOUNTER — HOSPITAL ENCOUNTER (OUTPATIENT)
Dept: LAB | Facility: MEDICAL CENTER | Age: 43
End: 2018-04-18
Attending: NURSE PRACTITIONER
Payer: COMMERCIAL

## 2018-04-18 DIAGNOSIS — E29.1 HYPOGONADISM IN MALE: ICD-10-CM

## 2018-04-18 PROCEDURE — 83002 ASSAY OF GONADOTROPIN (LH): CPT

## 2018-04-18 PROCEDURE — 84146 ASSAY OF PROLACTIN: CPT

## 2018-04-18 PROCEDURE — 83001 ASSAY OF GONADOTROPIN (FSH): CPT

## 2018-04-18 PROCEDURE — 36415 COLL VENOUS BLD VENIPUNCTURE: CPT

## 2018-04-18 NOTE — TELEPHONE ENCOUNTER
Please let pt know that the testosterone is low. Needs t do additional lab before being seen. [facility]

## 2018-04-18 NOTE — TELEPHONE ENCOUNTER
Phone Number Called: [phone] (home)     Message: Left message for pt to call back at [phone].     Left Message for patient to call back: yes

## 2018-04-19 LAB
FSH SERPL-ACNC: 9 MIU/ML (ref 1.5–12.4)
LH SERPL-ACNC: 9 IU/L (ref 1.7–8.6)
PROLACTIN SERPL-MCNC: 8.24 NG/ML (ref 2.1–17.7)

## 2018-04-23 ENCOUNTER — OFFICE VISIT (OUTPATIENT)
Dept: MEDICAL GROUP | Facility: MEDICAL CENTER | Age: 43
End: 2018-04-23
Payer: COMMERCIAL

## 2018-04-23 VITALS
HEIGHT: 70 IN | OXYGEN SATURATION: 96 % | BODY MASS INDEX: 32.78 KG/M2 | DIASTOLIC BLOOD PRESSURE: 92 MMHG | HEART RATE: 78 BPM | WEIGHT: 229 LBS | SYSTOLIC BLOOD PRESSURE: 130 MMHG | TEMPERATURE: 98.1 F

## 2018-04-23 DIAGNOSIS — I10 ESSENTIAL HYPERTENSION: ICD-10-CM

## 2018-04-23 DIAGNOSIS — E55.9 VITAMIN D DEFICIENCY: ICD-10-CM

## 2018-04-23 DIAGNOSIS — E29.1 HYPOGONADISM, MALE: ICD-10-CM

## 2018-04-23 PROCEDURE — 99214 OFFICE O/P EST MOD 30 MIN: CPT | Performed by: NURSE PRACTITIONER

## 2018-04-23 RX ORDER — TESTOSTERONE 16.2 MG/G
20 GEL TRANSDERMAL DAILY
Qty: 1 G | Refills: 2 | Status: SHIPPED | OUTPATIENT
Start: 2018-04-23 | End: 2018-07-22

## 2018-04-23 RX ORDER — MULTIVIT-MIN/IRON/FOLIC ACID/K 18-600-40
2000 CAPSULE ORAL DAILY
Qty: 30 CAP | Refills: 0
Start: 2018-04-23 | End: 2022-05-31

## 2018-04-23 RX ORDER — TADALAFIL 10 MG/1
10 TABLET ORAL PRN
Qty: 10 TAB | Refills: 3 | Status: SHIPPED | OUTPATIENT
Start: 2018-04-23 | End: 2018-11-18 | Stop reason: SDUPTHER

## 2018-04-23 RX ORDER — LISINOPRIL 30 MG/1
30 TABLET ORAL DAILY
Qty: 30 TAB | Refills: 1 | Status: SHIPPED | OUTPATIENT
Start: 2018-04-23 | End: 2018-06-26 | Stop reason: SDUPTHER

## 2018-04-23 ASSESSMENT — PATIENT HEALTH QUESTIONNAIRE - PHQ9: CLINICAL INTERPRETATION OF PHQ2 SCORE: 0

## 2018-04-23 NOTE — PROGRESS NOTES
Subjective:     Ramsey Allen is a 42 y.o. male who presents with HTN.    HPI:   Seen in f/u for HTN.  His bp is elevated with DBP today.  Better at home.  He is having anxiety.  He is using marijuana.  He was using edibles at home.  Then tried smoking it.  n ow he is using the vape pen instead of smoking.  He is now sleeping much better.  He is on otc supplement d3.  Will be more fatigued if misses doses.    He has dec his zoloft to 1/2 tab.  He going to try and wean off.  Reviewed lab with pt. His testosterone is low 216.  His FSH is wnl but LH is high.  Has been o testosterone in past.  Prolactin is wnl.  MRI brain was wnl in 7/17.  Reviewed risks and benefits of androgel with pt including but not limited to: dvt, prostate cancer.  He would like to try cialis also.  Vitamin d is low normal at 30.  He take 1582-3295 units daily.  \  CBC is wnl.        Patient Active Problem List    Diagnosis Date Noted   • Obesity (BMI 30-39.9) 12/14/2016   • Left-sided tinnitus 11/11/2016   • Vitamin d deficiency 03/20/2012   • Preventative health care 09/21/2011   • Recurrent sinusitis    • Post traumatic stress disorder (PTSD)    • Low back pain 07/08/2010   • HTN (hypertension) 04/28/2010   • Anxiety 10/21/2009   • Erectile dysfunction 08/04/2009       Current medicines (including changes today)  Current Outpatient Prescriptions   Medication Sig Dispense Refill   • Cholecalciferol (VITAMIN D) 2000 units Cap Take 1 Cap by mouth every day. 30 Cap 0   • Testosterone (ANDROGEL PUMP) 20.25 MG/ACT (1.62%) Gel Apply 20 mg to skin as directed every day for 90 days. Apply daily to skin daily.  Do  Not apply to testicles.  Do not allow women or children to touch med or skin treated with med.  Fax [phone] 1 g 2   • tadalafil (CIALIS) 10 MG tablet Take 1 Tab by mouth as needed for Erectile Dysfunction. 10 Tab 3   • lisinopril (PRINIVIL, ZESTRIL) 30 MG tablet Take 1 Tab by mouth every day. 30 Tab 1   • sertraline (ZOLOFT) 50  "MG Tab Take 1 Tab by mouth every day. 90 Tab 1   • lisinopril (PRINIVIL) 20 MG Tab Take 1 Tab by mouth every day. TAKE 1 TAB BY MOUTH EVERY DAY. 90 Tab 2   • Loratadine (CLARITIN) 10 MG CAPS Take  by mouth.       No current facility-administered medications for this visit.        Allergies   Allergen Reactions   • Erythromycin    • Ilosone [Erythromycin Estolate]        ROS  Constitutional: Negative. Negative for fever, chills, weight loss, malaise/fatigue and diaphoresis.   HENT: Negative. Negative for hearing loss, ear pain, nosebleeds, congestion, sore throat, neck pain, tinnitus and ear discharge.   Respiratory: Negative. Negative for cough, hemoptysis, sputum production, shortness of breath, wheezing and stridor.   Cardiovascular: Negative. Negative for chest pain, palpitations, orthopnea, claudication, leg swelling and PND.   Gastrointestinal: Denies nausea, vomiting, diarrhea, constipation, heartburn, melena or hematochezia.  Genitourinary: Denies dysuria, hematuria, urinary incontinence, frequency or urgency.        Objective:     Blood pressure 130/92, pulse 78, temperature 36.7 °C (98.1 °F), height 1.778 m (5' 10\"), weight 103.9 kg (229 lb), SpO2 96 %. Body mass index is 32.86 kg/m².    Physical Exam:  Vitals reviewed.  Constitutional: Oriented to person, place, and time. appears well-developed and well-nourished. No distress.   Cardiovascular: Normal rate, regular rhythm, normal heart sounds and intact distal pulses. Exam reveals no gallop and no friction rub. No murmur heard. No carotid bruits.   Pulmonary/Chest: Effort normal and breath sounds normal. No stridor. No respiratory distress. no wheezes or rales. exhibits no tenderness.   Musculoskeletal: Normal range of motion. exhibits no edema. otoniel pedal pulses 2+.  Lymphadenopathy: No cervical or supraclavicular adenopathy.   Neurological: Alert and oriented to person, place, and time. exhibits normal muscle tone.  Skin: Skin is warm and dry. No " diaphoresis.   Psychiatric: Normal mood and affect. Behavior is normal.      Assessment and Plan:     The following treatment plan was discussed:    1. Hypogonadism, male  Testosterone (ANDROGEL PUMP) 20.25 MG/ACT (1.62%) Gel    tadalafil (CIALIS) 10 MG tablet    TESTOSTERONE F&T MALE ADULT    PSA TOTAL + %FREE    restart testosterone at 20 mg dialy with androgel.  add cialis.  plan recheck testosterone in 3 months with psa.    2. Essential hypertension  lisinopril (PRINIVIL, ZESTRIL) 30 MG tablet    inc lisinopril to 30 mg daily.  f/u 1 month for bp check and sx eval   3. Vitamin D deficiency  Cholecalciferol (VITAMIN D) 2000 units Cap    vitamin d is low normalt.  take 2333-5335 units d3 dialy otc.     4. BMI 32.0-32.9,adult  Patient identified as having weight management issue.  Appropriate orders and counseling given.         Followup: Return in about 4 weeks (around 5/21/2018).

## 2018-05-17 DIAGNOSIS — I10 ESSENTIAL HYPERTENSION: ICD-10-CM

## 2018-05-17 DIAGNOSIS — F41.9 ANXIETY: ICD-10-CM

## 2018-05-17 RX ORDER — LISINOPRIL 20 MG/1
TABLET ORAL
Qty: 90 TAB | Refills: 1 | Status: SHIPPED | OUTPATIENT
Start: 2018-05-17 | End: 2019-02-21

## 2018-06-02 DIAGNOSIS — F41.9 ANXIETY: ICD-10-CM

## 2018-06-05 ENCOUNTER — OFFICE VISIT (OUTPATIENT)
Dept: MEDICAL GROUP | Facility: MEDICAL CENTER | Age: 43
End: 2018-06-05
Payer: COMMERCIAL

## 2018-06-05 VITALS
TEMPERATURE: 97.7 F | SYSTOLIC BLOOD PRESSURE: 144 MMHG | OXYGEN SATURATION: 97 % | HEART RATE: 85 BPM | WEIGHT: 228 LBS | BODY MASS INDEX: 32.64 KG/M2 | DIASTOLIC BLOOD PRESSURE: 100 MMHG | HEIGHT: 70 IN

## 2018-06-05 DIAGNOSIS — I10 ESSENTIAL HYPERTENSION: ICD-10-CM

## 2018-06-05 DIAGNOSIS — F41.9 ANXIETY: ICD-10-CM

## 2018-06-05 PROCEDURE — 99214 OFFICE O/P EST MOD 30 MIN: CPT | Performed by: NURSE PRACTITIONER

## 2018-06-05 RX ORDER — ALPRAZOLAM 0.25 MG/1
0.25 TABLET ORAL NIGHTLY PRN
Qty: 30 TAB | Refills: 0 | Status: SHIPPED | OUTPATIENT
Start: 2018-06-05 | End: 2018-07-05

## 2018-06-05 RX ORDER — DOXAZOSIN MESYLATE 1 MG/1
1 TABLET ORAL DAILY
Qty: 30 TAB | Refills: 1 | Status: SHIPPED | OUTPATIENT
Start: 2018-06-05 | End: 2018-08-24 | Stop reason: SDUPTHER

## 2018-06-05 NOTE — PROGRESS NOTES
Subjective:     Ramsey Allen is a 43 y.o. male who presents with HTN.    HPI:   Seen in f/u for HTN.  He is having a very stressful week.  He is thinking about a divorce.  He thinks that he may have to move.  He is going to set up appt with a counselor.  He is having a l ot of anxiety with trying to discuss with his wife.   Having SOB with anxiety.    He has started the testosterone but no difference yet.    He is hiking a lot.  Went 16 miles on sat.        Patient Active Problem List    Diagnosis Date Noted   • Obesity (BMI 30-39.9) 12/14/2016   • Left-sided tinnitus 11/11/2016   • Vitamin d deficiency 03/20/2012   • Preventative health care 09/21/2011   • Recurrent sinusitis    • Post traumatic stress disorder (PTSD)    • Low back pain 07/08/2010   • HTN (hypertension) 04/28/2010   • Anxiety 10/21/2009   • Erectile dysfunction 08/04/2009       Current medicines (including changes today)  Current Outpatient Prescriptions   Medication Sig Dispense Refill   • ALPRAZolam (XANAX) 0.25 MG Tab Take 1 Tab by mouth at bedtime as needed for Sleep for up to 30 days. 30 Tab 0   • doxazosin (CARDURA) 1 MG Tab Take 1 Tab by mouth every day. 30 Tab 1   • sertraline (ZOLOFT) 50 MG Tab TAKE 1 TAB BY MOUTH EVERY DAY. 90 Tab 1   • sertraline (ZOLOFT) 50 MG Tab TAKE 1 TAB BY MOUTH EVERY DAY. 90 Tab 1   • lisinopril (PRINIVIL) 20 MG Tab TAKE 1 TAB BY MOUTH EVERY DAY.MAX 30 90 Tab 1   • Cholecalciferol (VITAMIN D) 2000 units Cap Take 1 Cap by mouth every day. 30 Cap 0   • Testosterone (ANDROGEL PUMP) 20.25 MG/ACT (1.62%) Gel Apply 20 mg to skin as directed every day for 90 days. Apply daily to skin daily.  Do  Not apply to testicles.  Do not allow women or children to touch med or skin treated with med.  Fax [phone] 1 g 2   • tadalafil (CIALIS) 10 MG tablet Take 1 Tab by mouth as needed for Erectile Dysfunction. 10 Tab 3   • lisinopril (PRINIVIL, ZESTRIL) 30 MG tablet Take 1 Tab by mouth every day. 30 Tab 1   • Loratadine  "(CLARITIN) 10 MG CAPS Take  by mouth.       No current facility-administered medications for this visit.        Allergies   Allergen Reactions   • Erythromycin    • Ilosone [Erythromycin Estolate]        ROS  Constitutional: Negative. Negative for fever, chills, weight loss, malaise/fatigue and diaphoresis.   HENT: Negative. Negative for hearing loss, ear pain, nosebleeds, congestion, sore throat, neck pain, tinnitus and ear discharge.   Respiratory: Negative. Negative for cough, hemoptysis, sputum production, shortness of breath, wheezing and stridor.   Cardiovascular: Negative. Negative for chest pain, palpitations, orthopnea, claudication, leg swelling and PND.   Gastrointestinal: Denies nausea, vomiting, diarrhea, constipation, heartburn, melena or hematochezia.  Genitourinary: Denies dysuria, hematuria, urinary incontinence, frequency or urgency.        Objective:     Blood pressure 144/100, pulse 85, temperature 36.5 °C (97.7 °F), height 1.778 m (5' 10\"), weight 103.4 kg (228 lb), SpO2 97 %. Body mass index is 32.71 kg/m².    Physical Exam:  Vitals reviewed.  Constitutional: Oriented to person, place, and time. appears well-developed and well-nourished. No distress.   Cardiovascular: Normal rate, regular rhythm, normal heart sounds and intact distal pulses. Exam reveals no gallop and no friction rub. No murmur heard. No carotid bruits.   Pulmonary/Chest: Effort normal and breath sounds normal. No stridor. No respiratory distress. no wheezes or rales. exhibits no tenderness.   Musculoskeletal: Normal range of motion. exhibits no edema. otoniel pedal pulses 2+.  Lymphadenopathy: No cervical or supraclavicular adenopathy.   Neurological: Alert and oriented to person, place, and time. exhibits normal muscle tone.  Skin: Skin is warm and dry. No diaphoresis.   Psychiatric: Normal mood and affect. Behavior is normal.      Assessment and Plan:     The following treatment plan was discussed:    1. Essential hypertension  " doxazosin (CARDURA) 1 MG Tab    add cardura 1 mg daily for bp control. f/u 1 month for bp check   2. Anxiety  ALPRAZolam (XANAX) 0.25 MG Tab    very stressed recently.  he is going to see counselor.  add xanax i po every 24 hr prn anxiety         Followup: Return in about 4 weeks (around 7/3/2018).

## 2018-06-10 ENCOUNTER — HOSPITAL ENCOUNTER (OUTPATIENT)
Dept: RADIOLOGY | Facility: MEDICAL CENTER | Age: 43
End: 2018-06-10
Attending: NURSE PRACTITIONER
Payer: COMMERCIAL

## 2018-06-10 ENCOUNTER — OFFICE VISIT (OUTPATIENT)
Dept: URGENT CARE | Facility: PHYSICIAN GROUP | Age: 43
End: 2018-06-10
Payer: COMMERCIAL

## 2018-06-10 VITALS
HEIGHT: 70 IN | OXYGEN SATURATION: 96 % | HEART RATE: 86 BPM | DIASTOLIC BLOOD PRESSURE: 97 MMHG | SYSTOLIC BLOOD PRESSURE: 154 MMHG | BODY MASS INDEX: 32.21 KG/M2 | WEIGHT: 225 LBS | TEMPERATURE: 97.5 F

## 2018-06-10 DIAGNOSIS — S93.401A SPRAIN OF RIGHT ANKLE, UNSPECIFIED LIGAMENT, INITIAL ENCOUNTER: ICD-10-CM

## 2018-06-10 DIAGNOSIS — I10 ELEVATED BLOOD PRESSURE READING WITH DIAGNOSIS OF HYPERTENSION: ICD-10-CM

## 2018-06-10 DIAGNOSIS — M25.571 ACUTE RIGHT ANKLE PAIN: ICD-10-CM

## 2018-06-10 PROCEDURE — 99214 OFFICE O/P EST MOD 30 MIN: CPT | Performed by: NURSE PRACTITIONER

## 2018-06-10 PROCEDURE — 73610 X-RAY EXAM OF ANKLE: CPT | Mod: RT

## 2018-06-10 ASSESSMENT — ENCOUNTER SYMPTOMS
CHILLS: 0
WEAKNESS: 0
FALLS: 0
DIAPHORESIS: 0
FEVER: 0

## 2018-06-10 ASSESSMENT — PAIN SCALES - GENERAL: PAINLEVEL: 8=MODERATE-SEVERE PAIN

## 2018-06-10 NOTE — LETTER
Danielle 10, 2018         Patient: Ramsey Allen   YOB: 1975   Date of Visit: 6/10/2018           To Whom it May Concern:    Ramsey Allen was seen in my clinic on 6/10/2018. Please excuse his recent absence for up to 3 days.  He may return to work as tolerated and has been advised to use crutches for walking or standing until symptoms resolve.    If you have any questions or concerns, please don't hesitate to call.        Sincerely,           INGA Newton.  Electronically Signed

## 2018-06-10 NOTE — PROGRESS NOTES
"Subjective:      Ramsey Allen is a 43 y.o. male who presents with Ankle Injury (x2 days. Pt rolled ankle, states it hurts to put pressure, move ankle. Swelling, discoloration.)            Patient comes in today with a 1 day history of right ankle pain after rolling the ankle while hiking.  After he rolled it, he had to hike 4-5 miles back to his car. Rates pain 8/10 when standing, improved when non-weight bearing.  Associated factors: some tingling of the toes last night, since resolved.  Patient has tried nothing to treat the symtpoms.  Denies any history of prior injury.            Review of Systems   Constitutional: Negative for chills, diaphoresis, fever and malaise/fatigue.   Musculoskeletal: Positive for joint pain. Negative for falls.   Neurological: Negative for weakness.     Medications, Allergies, and current problem list reviewed today in Epic     Objective:     /97   Pulse 86   Temp 36.4 °C (97.5 °F)   Ht 1.778 m (5' 10\")   Wt 102.1 kg (225 lb)   SpO2 96%   BMI 32.28 kg/m²      Physical Exam   Constitutional: He is oriented to person, place, and time. He appears well-developed and well-nourished. No distress.   Cardiovascular: Normal rate, regular rhythm and normal heart sounds.    Elevated blood pressure reading noted.  Patient was just prescribed Cardura and Xanax by PCP for essential hypertension and anxiety but hasn't started taking them.  No chest pain or shortness of breath.   Pulmonary/Chest: Effort normal and breath sounds normal.   Musculoskeletal:        Right ankle: He exhibits decreased range of motion, swelling and ecchymosis. He exhibits no deformity, no laceration and normal pulse. Tenderness. Lateral malleolus tenderness found. Achilles tendon normal.        Feet:    Right ankle is warm, dry, and intact with no erythema, rash or lesion.  Light bruising and 2+ swelling of the lateral malleolar region with TTP.  Pedal pulses intact.  ROM reduced due to pain.  " Sensation intact.  Cap refill < 2 seconds.     Neurological: He is alert and oriented to person, place, and time.   Skin: He is not diaphoretic.   Vitals reviewed.    View [employer] Info     DX-ANKLE 3+ VIEWS RIGHT (Order #[id number]) on 6/10/18   Narrative       6/10/2018 4:36 PM    HISTORY/REASON FOR EXAM:  Pain/Deformity Following Trauma      TECHNIQUE/EXAM DESCRIPTION AND NUMBER OF VIEWS:  3 views of the RIGHT ankle.    COMPARISON: None    FINDINGS:  There is no evidence of fracture or dislocation.  The ankle mortise is well-maintained. The talar dome is preserved.  There is soft tissue swelling particularly laterally. There is spurring of the calcaneus at the insertion of the Achilles tendon. There   is spurring of the anterior dorsal talus. Ankle joint effusion may be present.     Impression       No evidence of fracture or dislocation.    Soft tissue swelling.    Ankle joint effusion may be present.    Calcaneal spurring.   Reading Provider Reading Date   Darling Livingston M.D. Sergei 10, 2018   Signing Provider Signing Date Signing Time   Darling Livingston M.D. Sergei 10, 2018  4:48 PM               Assessment/Plan:     1. Sprain of right ankle, unspecified ligament, initial encounter  CRUTCHES   2. Acute right ankle pain  DX-ANKLE 3+ VIEWS RIGHT   3. Elevated blood pressure reading with diagnosis of hypertension       Discussed exam findings and imaging results with patient.  RICE protocol reviewed.  Ace wrap and crutches provided in clinic.  OTC NSAIDs prn pain.  Follow up in 1 week if symptoms persist, sooner if worse.  Start cardura as previously prescribed.  Re-check blood pressure in 3-5 days.  Patient verbalized understanding of and agreed with plan of care.

## 2018-06-26 DIAGNOSIS — I10 ESSENTIAL HYPERTENSION: ICD-10-CM

## 2018-06-26 RX ORDER — LISINOPRIL 30 MG/1
30 TABLET ORAL DAILY
Qty: 90 TAB | Refills: 1 | Status: SHIPPED | OUTPATIENT
Start: 2018-06-26 | End: 2018-09-20 | Stop reason: SDUPTHER

## 2018-08-24 DIAGNOSIS — I10 ESSENTIAL HYPERTENSION: ICD-10-CM

## 2018-08-24 RX ORDER — DOXAZOSIN MESYLATE 1 MG/1
TABLET ORAL
Qty: 30 TAB | Refills: 0 | Status: SHIPPED | OUTPATIENT
Start: 2018-08-24 | End: 2019-02-21 | Stop reason: SDUPTHER

## 2018-09-13 ENCOUNTER — OFFICE VISIT (OUTPATIENT)
Dept: URGENT CARE | Facility: PHYSICIAN GROUP | Age: 43
End: 2018-09-13
Payer: COMMERCIAL

## 2018-09-13 VITALS
WEIGHT: 220 LBS | SYSTOLIC BLOOD PRESSURE: 132 MMHG | DIASTOLIC BLOOD PRESSURE: 92 MMHG | BODY MASS INDEX: 31.5 KG/M2 | HEIGHT: 70 IN | HEART RATE: 99 BPM | TEMPERATURE: 99.1 F | OXYGEN SATURATION: 96 %

## 2018-09-13 DIAGNOSIS — R06.2 WHEEZING: ICD-10-CM

## 2018-09-13 DIAGNOSIS — J32.4 PANSINUSITIS, UNSPECIFIED CHRONICITY: ICD-10-CM

## 2018-09-13 PROCEDURE — 99214 OFFICE O/P EST MOD 30 MIN: CPT | Performed by: PHYSICIAN ASSISTANT

## 2018-09-13 RX ORDER — ALBUTEROL SULFATE 90 UG/1
2 AEROSOL, METERED RESPIRATORY (INHALATION) EVERY 6 HOURS PRN
Qty: 8.5 G | Refills: 0 | Status: SHIPPED | OUTPATIENT
Start: 2018-09-13 | End: 2019-02-21

## 2018-09-13 RX ORDER — AMOXICILLIN AND CLAVULANATE POTASSIUM 875; 125 MG/1; MG/1
1 TABLET, FILM COATED ORAL 2 TIMES DAILY
Qty: 14 TAB | Refills: 0 | Status: SHIPPED | OUTPATIENT
Start: 2018-09-13 | End: 2018-09-20

## 2018-09-13 RX ORDER — TESTOSTERONE 16.2 MG/G
GEL TRANSDERMAL
COMMUNITY
Start: 2018-09-06 | End: 2018-09-24 | Stop reason: SDUPTHER

## 2018-09-13 ASSESSMENT — ENCOUNTER SYMPTOMS
CHILLS: 0
FEVER: 0
TINGLING: 0
SORE THROAT: 0
DIZZINESS: 0
DIARRHEA: 0
SINUS PAIN: 1
SINUS PRESSURE: 1
VOMITING: 0
COUGH: 1
WHEEZING: 0
EYE DISCHARGE: 0
MYALGIAS: 0
ABDOMINAL PAIN: 0
HEADACHES: 1
NECK PAIN: 0
EYE REDNESS: 0

## 2018-09-13 NOTE — PROGRESS NOTES
"Subjective:      Ramsey Allen is a 43 y.o. male who presents with Sinus Problem (x couple weeks/ sinus pressure/ headache/ runny nose/ sneezing/ cough  )            Sinus Problem   This is a new problem. Episode onset: 2-3 weeks ago. The problem has been gradually worsening since onset. There has been no fever. His pain is at a severity of 4/10. The pain is moderate. Associated symptoms include congestion, coughing, headaches and sinus pressure. Pertinent negatives include no chills, ear pain, neck pain or sore throat. Treatments tried: Claritin, OTC decongestants.  The treatment provided mild relief.       Review of Systems   Constitutional: Positive for malaise/fatigue. Negative for chills and fever.   HENT: Positive for congestion, sinus pain and sinus pressure. Negative for ear discharge, ear pain and sore throat.         Pos. For ear pressure     Eyes: Negative for discharge and redness.   Respiratory: Positive for cough. Negative for wheezing.    Cardiovascular: Negative for chest pain and leg swelling.   Gastrointestinal: Negative for abdominal pain, diarrhea and vomiting.   Genitourinary: Negative for dysuria and urgency.   Musculoskeletal: Negative for myalgias and neck pain.   Skin: Negative for itching and rash.   Neurological: Positive for headaches. Negative for dizziness and tingling.   All other systems reviewed and are negative.         Objective:     /92   Pulse 99   Temp 37.3 °C (99.1 °F)   Ht 1.778 m (5' 10\")   Wt 99.8 kg (220 lb)   SpO2 96%   BMI 31.57 kg/m²    PMH:  has a past medical history of Anxiety; ED (erectile dysfunction); Hypertension; LBP (low back pain); Meningitis; Post traumatic stress disorder (PTSD); and Recurrent sinusitis.  MEDS:   Current Outpatient Prescriptions:   •  ANDROGEL PUMP 20.25 MG/ACT (1.62%) Gel, , Disp: , Rfl:   •  amoxicillin-clavulanate (AUGMENTIN) 875-125 MG Tab, Take 1 Tab by mouth 2 times a day for 7 days., Disp: 14 Tab, Rfl: 0  •  " albuterol 108 (90 Base) MCG/ACT Aero Soln inhalation aerosol, Inhale 2 Puffs by mouth every 6 hours as needed for Shortness of Breath., Disp: 8.5 g, Rfl: 0  •  lisinopril (PRINIVIL, ZESTRIL) 30 MG tablet, TAKE 1 TAB BY MOUTH EVERY DAY., Disp: 90 Tab, Rfl: 1  •  Cholecalciferol (VITAMIN D) 2000 units Cap, Take 1 Cap by mouth every day., Disp: 30 Cap, Rfl: 0  •  Loratadine (CLARITIN) 10 MG CAPS, Take  by mouth., Disp: , Rfl:   •  doxazosin (CARDURA) 1 MG Tab, TAKE 1 TABLET BY MOUTH EVERY DAY, Disp: 30 Tab, Rfl: 0  •  sertraline (ZOLOFT) 50 MG Tab, TAKE 1 TAB BY MOUTH EVERY DAY., Disp: 90 Tab, Rfl: 1  •  sertraline (ZOLOFT) 50 MG Tab, TAKE 1 TAB BY MOUTH EVERY DAY., Disp: 90 Tab, Rfl: 1  •  lisinopril (PRINIVIL) 20 MG Tab, TAKE 1 TAB BY MOUTH EVERY DAY.MAX 30, Disp: 90 Tab, Rfl: 1  •  tadalafil (CIALIS) 10 MG tablet, Take 1 Tab by mouth as needed for Erectile Dysfunction., Disp: 10 Tab, Rfl: 3  ALLERGIES:   Allergies   Allergen Reactions   • Erythromycin    • Ilosone [Erythromycin Estolate]      SURGHX:   Past Surgical History:   Procedure Laterality Date   • SEPTOTURBINOPLASTY  8/22/08    Performed by LEIDA SOLOMON at [facility]   • ANTROSTOMY  8/22/08    Performed by LEIDA SOLOMON at [facility]   • SINUSCOPY  8/22/08    Performed by LEIDA SOLOMON at [facility]     SOCHX:  reports that he quit smoking about 8 years ago. His smoking use included Cigarettes. He quit after 10.00 years of use. He has quit using smokeless tobacco. His smokeless tobacco use included Chew. He reports that he drinks alcohol. He reports that he uses drugs, including Marijuana.  FH: Family history was reviewed, no pertinent findings to report    Physical Exam   Constitutional: He is oriented to person, place, and time. He appears well-developed and well-nourished.   HENT:   Head: Normocephalic and atraumatic.   Mouth/Throat: No oropharyngeal exudate.   Bilateral clear effusions-  without bulge or erythema to the TM.   Posterior oropharynx with pos. PND without tonsillar edema or erythema.   Nose- boggy turbinates with mild-moderate amount of nasal discharge. Bilateral maxillary sinus tenderness with percussion.    Eyes: Pupils are equal, round, and reactive to light. EOM are normal.   Neck: Normal range of motion. Neck supple.   Cardiovascular: Normal rate and regular rhythm.    Pulmonary/Chest: Effort normal. No respiratory distress. He has wheezes.   Musculoskeletal: Normal range of motion. He exhibits no edema.   Lymphadenopathy:     He has no cervical adenopathy.   Neurological: He is alert and oriented to person, place, and time.   Skin: Skin is warm. No rash noted.   Psychiatric: He has a normal mood and affect. His behavior is normal.   Vitals reviewed.              Assessment/Plan:     1. Pansinusitis, unspecified chronicity  - amoxicillin-clavulanate (AUGMENTIN) 875-125 MG Tab; Take 1 Tab by mouth 2 times a day for 7 days.  Dispense: 14 Tab; Refill: 0    2. Wheezing  - albuterol 108 (90 Base) MCG/ACT Aero Soln inhalation aerosol; Inhale 2 Puffs by mouth every 6 hours as needed for Shortness of Breath.  Dispense: 8.5 g; Refill: 0    Due to duration of symptoms, sinus tenderness, and failure of OTC therapies- ABX was written to tx. For bacterial etiology for sinusitis.   Continue OTC supportive therapies- Flonase, OTC allergy meds, avoid night time dairy. Increase fluids. Humidification.   Patient given precautionary s/sx that mandate immediate follow up and evaluation in the ED. Advised of risks of not doing so.    DDX, Supportive care, and indications for immediate follow-up discussed with patient.    Instructed to return to clinic or nearest emergency department if we are not available for any change in condition, further concerns, or worsening of symptoms.    The patient demonstrated a good understanding and agreed with the treatment plan

## 2018-09-13 NOTE — LETTER
September 13, 2018         Patient: Ramsey Allen   YOB: 1975   Date of Visit: 9/13/2018           To Whom it May Concern:    Ramsey Allen was seen in my clinic on 9/13/2018. Please excuse this patient from work due to recent ailment, he will be also be out tomorrow as well.    If you have any questions or concerns, please don't hesitate to call.        Sincerely,           Raul Jara P.A.-C.  Electronically Signed

## 2018-09-18 ENCOUNTER — HOSPITAL ENCOUNTER (OUTPATIENT)
Dept: LAB | Facility: MEDICAL CENTER | Age: 43
End: 2018-09-18
Attending: NURSE PRACTITIONER
Payer: COMMERCIAL

## 2018-09-18 DIAGNOSIS — E29.1 HYPOGONADISM, MALE: ICD-10-CM

## 2018-09-18 PROCEDURE — 84154 ASSAY OF PSA FREE: CPT

## 2018-09-18 PROCEDURE — 84270 ASSAY OF SEX HORMONE GLOBUL: CPT

## 2018-09-18 PROCEDURE — 36415 COLL VENOUS BLD VENIPUNCTURE: CPT

## 2018-09-18 PROCEDURE — 84403 ASSAY OF TOTAL TESTOSTERONE: CPT

## 2018-09-18 PROCEDURE — 84153 ASSAY OF PSA TOTAL: CPT

## 2018-09-20 DIAGNOSIS — I10 ESSENTIAL HYPERTENSION: ICD-10-CM

## 2018-09-20 LAB
PSA FREE MFR SERPL: 11 %
PSA FREE SERPL-MCNC: 0.1 NG/ML
PSA SERPL-MCNC: 0.9 NG/ML (ref 0–4)
SHBG SERPL-SCNC: 16 NMOL/L (ref 11–80)
TESTOST FREE MFR SERPL: 2.5 % (ref 1.6–2.9)
TESTOST FREE SERPL-MCNC: 75 PG/ML (ref 47–244)
TESTOST SERPL-MCNC: 303 NG/DL (ref 300–890)

## 2018-09-20 RX ORDER — LISINOPRIL 30 MG/1
30 TABLET ORAL DAILY
Qty: 90 TAB | Refills: 1 | Status: SHIPPED | OUTPATIENT
Start: 2018-09-20 | End: 2019-09-27 | Stop reason: SDUPTHER

## 2018-09-20 NOTE — TELEPHONE ENCOUNTER
----- Message from Louann White sent at 9/18/2018  2:35 PM PDT -----  Regarding: BP Med refill  Contact: [phone]  Patient is in need of refill on BP med. [facility] on [address], any questions plz call patient at above number. Thanks he scheduled an appt for next week on 9/24 at 1:20..

## 2018-09-24 ENCOUNTER — OFFICE VISIT (OUTPATIENT)
Dept: MEDICAL GROUP | Facility: MEDICAL CENTER | Age: 43
End: 2018-09-24
Payer: COMMERCIAL

## 2018-09-24 VITALS
TEMPERATURE: 99.1 F | RESPIRATION RATE: 16 BRPM | BODY MASS INDEX: 31.28 KG/M2 | WEIGHT: 218 LBS | SYSTOLIC BLOOD PRESSURE: 140 MMHG | HEART RATE: 74 BPM | OXYGEN SATURATION: 100 % | DIASTOLIC BLOOD PRESSURE: 92 MMHG

## 2018-09-24 DIAGNOSIS — I10 ESSENTIAL HYPERTENSION: ICD-10-CM

## 2018-09-24 DIAGNOSIS — F41.9 ANXIETY: ICD-10-CM

## 2018-09-24 DIAGNOSIS — R53.83 OTHER FATIGUE: ICD-10-CM

## 2018-09-24 DIAGNOSIS — R06.83 SNORING: ICD-10-CM

## 2018-09-24 DIAGNOSIS — J30.1 CHRONIC ALLERGIC RHINITIS DUE TO POLLEN: ICD-10-CM

## 2018-09-24 DIAGNOSIS — E29.1 HYPOGONADISM IN MALE: ICD-10-CM

## 2018-09-24 PROCEDURE — 99214 OFFICE O/P EST MOD 30 MIN: CPT | Performed by: NURSE PRACTITIONER

## 2018-09-24 RX ORDER — MONTELUKAST SODIUM 10 MG/1
10 TABLET ORAL DAILY
Qty: 30 TAB | Refills: 4 | Status: SHIPPED | OUTPATIENT
Start: 2018-09-24 | End: 2019-06-13 | Stop reason: SDUPTHER

## 2018-09-24 RX ORDER — TESTOSTERONE 16.2 MG/G
40.5 GEL TRANSDERMAL DAILY
Qty: 4 BOTTLE | Refills: 1 | Status: SHIPPED | OUTPATIENT
Start: 2018-09-24 | End: 2019-02-21 | Stop reason: SDUPTHER

## 2018-09-24 NOTE — PROGRESS NOTES
Subjective:     Ramsey Allen is a 43 y.o. male who presents with HTN.    HPI:   Seen in f/u for HTN.    He has been taking bp at home and its always good.  When he come here or driving his bp goes up d/t hthe anxiety.  Taking meds approp.  He was weaning off the zoloft in may.  He finished the med.  He uses activity to control his anxiety.  Then he sprained his ankle and he couldnt to any activity. Now that he is off med his drowsiness and feeling like a zombie is resolved. m ore focused.  He has also lost wt w/o trying since coming off the med.  He is now more anixety. He is restarted activity to control his sx.  Wants to wait on starting another med for his anxiety.  He is trying not to let his anxiety ruin his life.   He also had to move unexpected.   Then he got a severe sinus infection.  Still having hearing loss in rt ear.  Now having clear secretions.    He is always waking up fatigued.  His wife tells him that he snores and stops breathing.  No on CPAP.  Reviewed lab with pt.  His PSA is wnl  testosteron is low normal.  Taking med approp.       Patient Active Problem List    Diagnosis Date Noted   • Obesity (BMI 30-39.9) 12/14/2016   • Left-sided tinnitus 11/11/2016   • Vitamin d deficiency 03/20/2012   • Preventative health care 09/21/2011   • Recurrent sinusitis    • Post traumatic stress disorder (PTSD)    • Low back pain 07/08/2010   • HTN (hypertension) 04/28/2010   • Anxiety 10/21/2009   • Erectile dysfunction 08/04/2009       Current medicines (including changes today)  Current Outpatient Prescriptions   Medication Sig Dispense Refill   • montelukast (SINGULAIR) 10 MG Tab Take 1 Tab by mouth every day. 30 Tab 4   • ANDROGEL PUMP 20.25 MG/ACT (1.62%) Gel Apply 40.5 mg to affected area(s) every day for 90 days. 4 Bottle 1   • lisinopril (PRINIVIL, ZESTRIL) 30 MG tablet Take 1 Tab by mouth every day. 90 Tab 1   • albuterol 108 (90 Base) MCG/ACT Aero Soln inhalation aerosol Inhale 2 Puffs by  mouth every 6 hours as needed for Shortness of Breath. 8.5 g 0   • doxazosin (CARDURA) 1 MG Tab TAKE 1 TABLET BY MOUTH EVERY DAY 30 Tab 0   • lisinopril (PRINIVIL) 20 MG Tab TAKE 1 TAB BY MOUTH EVERY DAY.MAX 30 90 Tab 1   • Cholecalciferol (VITAMIN D) 2000 units Cap Take 1 Cap by mouth every day. 30 Cap 0   • tadalafil (CIALIS) 10 MG tablet Take 1 Tab by mouth as needed for Erectile Dysfunction. 10 Tab 3   • Loratadine (CLARITIN) 10 MG CAPS Take  by mouth.       No current facility-administered medications for this visit.        Allergies   Allergen Reactions   • Erythromycin    • Ilosone [Erythromycin Estolate]        ROS  Constitutional: Negative. Negative for fever, chills, weight loss, malaise/fatigue and diaphoresis.   HENT: Negative. Negative for hearing loss, ear pain, nosebleeds, congestion, sore throat, neck pain, tinnitus and ear discharge.   Respiratory: Negative. Negative for cough, hemoptysis, sputum production, shortness of breath, wheezing and stridor.   Cardiovascular: Negative. Negative for chest pain, palpitations, orthopnea, claudication, leg swelling and PND.   Gastrointestinal: Denies nausea, vomiting, diarrhea, constipation, heartburn, melena or hematochezia.  Genitourinary: Denies dysuria, hematuria, urinary incontinence, frequency or urgency.        Objective:     Blood pressure 140/92, pulse 74, temperature 37.3 °C (99.1 °F), temperature source Temporal, resp. rate 16, weight 98.9 kg (218 lb), SpO2 100 %. Body mass index is 31.28 kg/m².    Physical Exam:  Vitals reviewed.  Constitutional: Oriented to person, place, and time. appears well-developed and well-nourished. No distress.   Cardiovascular: Normal rate, regular rhythm, normal heart sounds and intact distal pulses. Exam reveals no gallop and no friction rub. No murmur heard. No carotid bruits.   Pulmonary/Chest: Effort normal and breath sounds normal. No stridor. No respiratory distress. no wheezes or rales. exhibits no tenderness.    Musculoskeletal: Normal range of motion. exhibits no edema. otoniel pedal pulses 2+.  Lymphadenopathy: No cervical or supraclavicular adenopathy.   Neurological: Alert and oriented to person, place, and time. exhibits normal muscle tone.  Skin: Skin is warm and dry. No diaphoresis.   Psychiatric: Normal mood and affect. Behavior is normal.      Assessment and Plan:     The following treatment plan was discussed:    1. Essential hypertension      stable at home.  no chagne in meds.  f/u 6 months.  call for lab slip   2. Snoring      do apnea link and f/u with pt with results.    3. Anxiety      stay off zoloft.  continue exercise that he is using for tx   4. Chronic allergic rhinitis due to pollen  montelukast (SINGULAIR) 10 MG Tab    continue claritin.  add flonase and singulair.     5. Other fatigue  TESTOSTERONE F&T MALE ADULT    do apena link.    6. Hypogonadism in male  ANDROGEL PUMP 20.25 MG/ACT (1.62%) Gel    TESTOSTERONE F&T MALE ADULT    inc androgel to 2 pumps daily.  recheck testosterone in 3 months.  f/u with pt wiht results.     7. BMI 31.0-31.9,adult  Patient identified as having weight management issue.  Appropriate orders and counseling given.         Followup: Return in about 6 months (around 3/24/2019).

## 2018-12-04 ENCOUNTER — TELEPHONE (OUTPATIENT)
Dept: MEDICAL GROUP | Facility: MEDICAL CENTER | Age: 43
End: 2018-12-04

## 2018-12-04 NOTE — TELEPHONE ENCOUNTER
Please get apnea link result and place in chart.  i think we already have it somewhere.  Did he get started on o2?

## 2019-01-16 ENCOUNTER — TELEPHONE (OUTPATIENT)
Dept: MEDICAL GROUP | Facility: MEDICAL CENTER | Age: 44
End: 2019-01-16

## 2019-01-16 DIAGNOSIS — Z01.818 PREOP TESTING: ICD-10-CM

## 2019-01-22 RX ORDER — ZOLPIDEM TARTRATE 10 MG/1
10 TABLET ORAL NIGHTLY PRN
Qty: 1 TAB | Refills: 0 | Status: SHIPPED
Start: 2019-01-22 | End: 2019-01-23

## 2019-01-22 NOTE — TELEPHONE ENCOUNTER
Pt states he just received the equipment today and it requires 4 or more hours of sleep. Pt states he has tried xanax in the past for sleep but it hasn't worked and is asking if he can get a sleep aid so he can perform the test

## 2019-01-28 ENCOUNTER — TELEPHONE (OUTPATIENT)
Dept: MEDICAL GROUP | Facility: MEDICAL CENTER | Age: 44
End: 2019-01-28

## 2019-01-28 NOTE — LETTER
February 6, 2019        Ramsey Allen  [address]  [facility] [address]        Dear Ramsey:    Claudine Chase's office has tried contacting you. At your earliest convenience please contact our office at [phone].      If you have any questions or concerns, please don't hesitate to call.        Sincerely,        CONNOR Gusman.    Electronically Signed

## 2019-01-29 NOTE — TELEPHONE ENCOUNTER
Please let pt know that the apnea link showed normal for apnea but his o2 still fell below 79%.  The recommend o2. i have signed the order.  i would still recommend that he do a sleep study and see sleep dr to make sure this is real.  Is he ok with that?  Let me know and i will place the order.

## 2019-02-06 NOTE — TELEPHONE ENCOUNTER
Pt states his nighttime o2 machine is causing him to loose more sleep since he has used it. Pt is not sure if there is a different way to treat this.-pt is also wanting to talk to you about restarting anxiety med at his next apt.  Pt is asking if he should take a break from using the machine at night so he can get more sleep?

## 2019-02-06 NOTE — TELEPHONE ENCOUNTER
i would prefer that he go back to [facility] and try to find a mask that he can tolerate.  i also agree that we can give him antianxiety med.  Does he want to try buspar?

## 2019-02-21 ENCOUNTER — OFFICE VISIT (OUTPATIENT)
Dept: MEDICAL GROUP | Facility: MEDICAL CENTER | Age: 44
End: 2019-02-21
Payer: COMMERCIAL

## 2019-02-21 VITALS
BODY MASS INDEX: 29.78 KG/M2 | SYSTOLIC BLOOD PRESSURE: 142 MMHG | HEART RATE: 87 BPM | HEIGHT: 70 IN | DIASTOLIC BLOOD PRESSURE: 88 MMHG | OXYGEN SATURATION: 97 % | WEIGHT: 208 LBS | TEMPERATURE: 98.4 F

## 2019-02-21 DIAGNOSIS — N52.8 OTHER MALE ERECTILE DYSFUNCTION: ICD-10-CM

## 2019-02-21 DIAGNOSIS — Z79.899 HIGH RISK MEDICATION USE: ICD-10-CM

## 2019-02-21 DIAGNOSIS — F51.01 PRIMARY INSOMNIA: ICD-10-CM

## 2019-02-21 DIAGNOSIS — Z13.220 SCREENING FOR HYPERLIPIDEMIA: ICD-10-CM

## 2019-02-21 DIAGNOSIS — I10 ESSENTIAL HYPERTENSION: ICD-10-CM

## 2019-02-21 DIAGNOSIS — Z13.21 ENCOUNTER FOR VITAMIN DEFICIENCY SCREENING: ICD-10-CM

## 2019-02-21 DIAGNOSIS — F41.9 ANXIETY AND DEPRESSION: ICD-10-CM

## 2019-02-21 DIAGNOSIS — F32.A ANXIETY AND DEPRESSION: ICD-10-CM

## 2019-02-21 DIAGNOSIS — G47.30 SLEEP APNEA, UNSPECIFIED TYPE: ICD-10-CM

## 2019-02-21 DIAGNOSIS — E29.1 HYPOGONADISM, MALE: ICD-10-CM

## 2019-02-21 PROCEDURE — 99214 OFFICE O/P EST MOD 30 MIN: CPT | Performed by: NURSE PRACTITIONER

## 2019-02-21 RX ORDER — TADALAFIL 10 MG/1
10 TABLET ORAL PRN
Qty: 6 TAB | Refills: 6 | Status: SHIPPED | OUTPATIENT
Start: 2019-02-21 | End: 2020-10-19 | Stop reason: SDUPTHER

## 2019-02-21 RX ORDER — TESTOSTERONE 16.2 MG/G
40.5 GEL TRANSDERMAL DAILY
Qty: 4 BOTTLE | Refills: 1 | Status: SHIPPED | OUTPATIENT
Start: 2019-02-21 | End: 2019-05-22

## 2019-02-21 RX ORDER — DOXAZOSIN MESYLATE 1 MG/1
1 TABLET ORAL
Qty: 30 TAB | Refills: 1 | Status: SHIPPED | OUTPATIENT
Start: 2019-02-21 | End: 2019-06-25 | Stop reason: SDUPTHER

## 2019-02-21 ASSESSMENT — PATIENT HEALTH QUESTIONNAIRE - PHQ9: CLINICAL INTERPRETATION OF PHQ2 SCORE: 0

## 2019-02-21 NOTE — PROGRESS NOTES
Subjective:     Ramsey Allen is a 43 y.o. male who presents with insomnia.    HPI:   Seen in f/u for insomnia.    He has lost 30 lbs over the last 6 months. He lost 20 lbs rapidly after coming off zoloft.  Then he started exercising and lost another 10 lbs.    He was changed off zoloft to buspar.  Then he had some added stress.  Now having more depression and anxiety.  Feels he needs to restrat zoloft.  He was recently started on buspar for the last several weeks.  He will take then after 1/2 hr he is feeling tingling.  Might be helping him sleep better.  He has a hx of staying up late.  Then in nov he would wake up after 2-3 hrs and not be able to RTW.   He was also dx with sleep apnea.  He is on o2 now at [facility].  He is having difficulty with sleeping with the o2 on.  He has a lot of difficulty with sleeping with anyone in the room.  He is very concerned that he will not be able to sleep with CPAP.    Since he start o2 he wasn't able to sleep.  He started the buspar and it helped some.  Now when he wakes up at [facility] he willt jai off the o2 and be able to get back to sleep.  He hasn't had sleep referral yet until he can sleep with the o2.  He feels that its d/t the cannula in his nose.  Will try face mask.  He is on testosterone up to 2 pumps daily.  He is due updated lab  Bp is elevated.  He is on lisinopril 30 mg daily.  Was on cardura until rf ran out and didn't know to refill.  Will restart.  Needs refill on cialis.  Stable on med  He is interested in doing preventative lab.        Patient Active Problem List    Diagnosis Date Noted   • Obesity (BMI 30-39.9) 12/14/2016   • Left-sided tinnitus 11/11/2016   • Vitamin d deficiency 03/20/2012   • Preventative health care 09/21/2011   • Recurrent sinusitis    • Post traumatic stress disorder (PTSD)    • Low back pain 07/08/2010   • HTN (hypertension) 04/28/2010   • Anxiety 10/21/2009   • Erectile dysfunction 08/04/2009       Current medicines (including  "changes today)  Current Outpatient Prescriptions   Medication Sig Dispense Refill   • doxazosin (CARDURA) 1 MG Tab Take 1 Tab by mouth every day. 30 Tab 1   • tadalafil (CIALIS) 10 MG tablet Take 1 Tab by mouth as needed for Erectile Dysfunction. 6 Tab 6   • ANDROGEL PUMP 20.25 MG/ACT (1.62%) Gel Apply 40.5 mg to affected area(s) every day for 90 days. 4 Bottle 1   • busPIRone (BUSPAR) 15 MG tablet Take 1 Tab by mouth 2 times a day. 60 Tab 0   • montelukast (SINGULAIR) 10 MG Tab Take 1 Tab by mouth every day. 30 Tab 4   • lisinopril (PRINIVIL, ZESTRIL) 30 MG tablet Take 1 Tab by mouth every day. 90 Tab 1   • Cholecalciferol (VITAMIN D) 2000 units Cap Take 1 Cap by mouth every day. 30 Cap 0   • Loratadine (CLARITIN) 10 MG CAPS Take  by mouth.       No current facility-administered medications for this visit.        Allergies   Allergen Reactions   • Erythromycin    • Ilosone [Erythromycin Estolate]        ROS  Constitutional: Negative. Negative for fever, chills, weight loss, malaise/fatigue and diaphoresis.   HENT: Negative. Negative for hearing loss, ear pain, nosebleeds, congestion, sore throat, neck pain, tinnitus and ear discharge.   Respiratory: Negative. Negative for cough, hemoptysis, sputum production, shortness of breath, wheezing and stridor.   Cardiovascular: Negative. Negative for chest pain, palpitations, orthopnea, claudication, leg swelling and PND.   Gastrointestinal: Denies nausea, vomiting, diarrhea, constipation, heartburn, melena or hematochezia.  Genitourinary: Denies dysuria, hematuria, urinary incontinence, frequency or urgency.        Objective:     Blood pressure 142/88, pulse 87, temperature 36.9 °C (98.4 °F), temperature source Temporal, height 1.778 m (5' 10\"), weight 94.3 kg (208 lb), SpO2 97 %. Body mass index is 29.84 kg/m².    Physical Exam:  Vitals reviewed.  Constitutional: Oriented to person, place, and time. appears well-developed and well-nourished. No distress.   Cardiovascular: " Normal rate, regular rhythm, normal heart sounds and intact distal pulses. Exam reveals no gallop and no friction rub. No murmur heard. No carotid bruits.   Pulmonary/Chest: Effort normal and breath sounds normal. No stridor. No respiratory distress. no wheezes or rales. exhibits no tenderness.   Musculoskeletal: Normal range of motion. exhibits no edema. otoniel pedal pulses 2+.  Lymphadenopathy: No cervical or supraclavicular adenopathy.   Neurological: Alert and oriented to person, place, and time. exhibits normal muscle tone.  Skin: Skin is warm and dry. No diaphoresis.   Psychiatric: Normal mood and affect. Behavior is normal.      Assessment and Plan:     The following treatment plan was discussed:    1. Essential hypertension  doxazosin (CARDURA) 1 MG Tab    Comp Metabolic Panel    MICROALBUMIN CREAT RATIO URINE    refill and restart cardura 1 mg daily for bp control. f/u 1 month for bp check and lab review and sx eval   2. Hypogonadism, male  tadalafil (CIALIS) 10 MG tablet    ANDROGEL PUMP 20.25 MG/ACT (1.62%) Gel    Comp Metabolic Panel    CBC WITH DIFFERENTIAL    TESTOSTERONE F&T MALE ADULT    refill testosterone at 2 pumps mg dialy with androgel.  plan recheck testosterone and other lab in 1 month.     3. Sleep apnea, unspecified type      change to face mask.  continue o2 at nite.  plan:  will get insomnia controlled with o2.  then refer to sleep clinic for eval   4. Primary insomnia      continue buspar for now.  f/u 1 month for eval   5. Anxiety and depression      not controlled since off zoloft.  add cymbalta 30 mg daily.  f/u 1 month for sx eval   6. Other male erectile dysfunction      stable on cialis.  refilled med   7. Encounter for vitamin deficiency screening  VITAMIN D,25 HYDROXY   8. Screening for hyperlipidemia  Lipid Profile   9. High risk medication use  CBC WITH DIFFERENTIAL    TESTOSTERONE F&T MALE ADULT         Followup: Return in about 4 weeks (around 3/21/2019).

## 2019-02-25 RX ORDER — DULOXETIN HYDROCHLORIDE 30 MG/1
30 CAPSULE, DELAYED RELEASE ORAL DAILY
Qty: 30 CAP | Refills: 1 | Status: SHIPPED | OUTPATIENT
Start: 2019-02-25 | End: 2019-04-22

## 2019-03-07 RX ORDER — BUSPIRONE HYDROCHLORIDE 15 MG/1
TABLET ORAL
Qty: 180 TAB | Refills: 0 | Status: SHIPPED | OUTPATIENT
Start: 2019-03-07 | End: 2019-04-15 | Stop reason: SDUPTHER

## 2019-03-15 ENCOUNTER — HOSPITAL ENCOUNTER (OUTPATIENT)
Dept: LAB | Facility: MEDICAL CENTER | Age: 44
End: 2019-03-15
Attending: NURSE PRACTITIONER
Payer: COMMERCIAL

## 2019-03-15 DIAGNOSIS — I10 ESSENTIAL HYPERTENSION: ICD-10-CM

## 2019-03-15 DIAGNOSIS — Z13.21 ENCOUNTER FOR VITAMIN DEFICIENCY SCREENING: ICD-10-CM

## 2019-03-15 DIAGNOSIS — Z13.220 SCREENING FOR HYPERLIPIDEMIA: ICD-10-CM

## 2019-03-15 DIAGNOSIS — Z79.899 HIGH RISK MEDICATION USE: ICD-10-CM

## 2019-03-15 DIAGNOSIS — E29.1 HYPOGONADISM, MALE: ICD-10-CM

## 2019-03-15 LAB
25(OH)D3 SERPL-MCNC: 39 NG/ML (ref 30–100)
ALBUMIN SERPL BCP-MCNC: 4.3 G/DL (ref 3.2–4.9)
ALBUMIN/GLOB SERPL: 1.7 G/DL
ALP SERPL-CCNC: 38 U/L (ref 30–99)
ALT SERPL-CCNC: 21 U/L (ref 2–50)
ANION GAP SERPL CALC-SCNC: 4 MMOL/L (ref 0–11.9)
AST SERPL-CCNC: 14 U/L (ref 12–45)
BASOPHILS # BLD AUTO: 1.2 % (ref 0–1.8)
BASOPHILS # BLD: 0.05 K/UL (ref 0–0.12)
BILIRUB SERPL-MCNC: 0.8 MG/DL (ref 0.1–1.5)
BUN SERPL-MCNC: 15 MG/DL (ref 8–22)
CALCIUM SERPL-MCNC: 9.6 MG/DL (ref 8.5–10.5)
CHLORIDE SERPL-SCNC: 108 MMOL/L (ref 96–112)
CHOLEST SERPL-MCNC: 128 MG/DL (ref 100–199)
CO2 SERPL-SCNC: 27 MMOL/L (ref 20–33)
CREAT SERPL-MCNC: 1.1 MG/DL (ref 0.5–1.4)
CREAT UR-MCNC: 155.1 MG/DL
EOSINOPHIL # BLD AUTO: 0.08 K/UL (ref 0–0.51)
EOSINOPHIL NFR BLD: 2 % (ref 0–6.9)
ERYTHROCYTE [DISTWIDTH] IN BLOOD BY AUTOMATED COUNT: 43.3 FL (ref 35.9–50)
GLOBULIN SER CALC-MCNC: 2.6 G/DL (ref 1.9–3.5)
GLUCOSE SERPL-MCNC: 98 MG/DL (ref 65–99)
HCT VFR BLD AUTO: 45.1 % (ref 42–52)
HDLC SERPL-MCNC: 41 MG/DL
HGB BLD-MCNC: 15.6 G/DL (ref 14–18)
IMM GRANULOCYTES # BLD AUTO: 0.01 K/UL (ref 0–0.11)
IMM GRANULOCYTES NFR BLD AUTO: 0.2 % (ref 0–0.9)
LDLC SERPL CALC-MCNC: 67 MG/DL
LYMPHOCYTES # BLD AUTO: 1.33 K/UL (ref 1–4.8)
LYMPHOCYTES NFR BLD: 32.6 % (ref 22–41)
MCH RBC QN AUTO: 31.4 PG (ref 27–33)
MCHC RBC AUTO-ENTMCNC: 34.6 G/DL (ref 33.7–35.3)
MCV RBC AUTO: 90.7 FL (ref 81.4–97.8)
MICROALBUMIN UR-MCNC: <0.7 MG/DL
MICROALBUMIN/CREAT UR: NORMAL MG/G (ref 0–30)
MONOCYTES # BLD AUTO: 0.33 K/UL (ref 0–0.85)
MONOCYTES NFR BLD AUTO: 8.1 % (ref 0–13.4)
NEUTROPHILS # BLD AUTO: 2.28 K/UL (ref 1.82–7.42)
NEUTROPHILS NFR BLD: 55.9 % (ref 44–72)
NRBC # BLD AUTO: 0 K/UL
NRBC BLD-RTO: 0 /100 WBC
PLATELET # BLD AUTO: 196 K/UL (ref 164–446)
PMV BLD AUTO: 9.7 FL (ref 9–12.9)
POTASSIUM SERPL-SCNC: 4.5 MMOL/L (ref 3.6–5.5)
PROT SERPL-MCNC: 6.9 G/DL (ref 6–8.2)
RBC # BLD AUTO: 4.97 M/UL (ref 4.7–6.1)
SODIUM SERPL-SCNC: 139 MMOL/L (ref 135–145)
TRIGL SERPL-MCNC: 100 MG/DL (ref 0–149)
WBC # BLD AUTO: 4.1 K/UL (ref 4.8–10.8)

## 2019-03-15 PROCEDURE — 84403 ASSAY OF TOTAL TESTOSTERONE: CPT

## 2019-03-15 PROCEDURE — 84270 ASSAY OF SEX HORMONE GLOBUL: CPT

## 2019-03-15 PROCEDURE — 80053 COMPREHEN METABOLIC PANEL: CPT

## 2019-03-15 PROCEDURE — 36415 COLL VENOUS BLD VENIPUNCTURE: CPT

## 2019-03-15 PROCEDURE — 82043 UR ALBUMIN QUANTITATIVE: CPT

## 2019-03-15 PROCEDURE — 85025 COMPLETE CBC W/AUTO DIFF WBC: CPT

## 2019-03-15 PROCEDURE — 82570 ASSAY OF URINE CREATININE: CPT

## 2019-03-15 PROCEDURE — 80061 LIPID PANEL: CPT

## 2019-03-15 PROCEDURE — 82306 VITAMIN D 25 HYDROXY: CPT

## 2019-03-16 LAB
SHBG SERPL-SCNC: 24 NMOL/L (ref 11–80)
TESTOST FREE MFR SERPL: 2.2 % (ref 1.6–2.9)
TESTOST FREE SERPL-MCNC: 85 PG/ML (ref 47–244)
TESTOST SERPL-MCNC: 396 NG/DL (ref 300–890)

## 2019-03-20 ENCOUNTER — OFFICE VISIT (OUTPATIENT)
Dept: MEDICAL GROUP | Facility: MEDICAL CENTER | Age: 44
End: 2019-03-20
Payer: COMMERCIAL

## 2019-03-20 VITALS
HEART RATE: 88 BPM | BODY MASS INDEX: 29.49 KG/M2 | WEIGHT: 206 LBS | OXYGEN SATURATION: 99 % | TEMPERATURE: 98.7 F | SYSTOLIC BLOOD PRESSURE: 132 MMHG | HEIGHT: 70 IN | DIASTOLIC BLOOD PRESSURE: 90 MMHG

## 2019-03-20 DIAGNOSIS — F51.01 PRIMARY INSOMNIA: ICD-10-CM

## 2019-03-20 DIAGNOSIS — D72.819 LEUKOPENIA, UNSPECIFIED TYPE: ICD-10-CM

## 2019-03-20 DIAGNOSIS — E29.1 HYPOGONADISM IN MALE: ICD-10-CM

## 2019-03-20 DIAGNOSIS — E78.6 LOW HDL (UNDER 40): ICD-10-CM

## 2019-03-20 DIAGNOSIS — F41.9 ANXIETY: ICD-10-CM

## 2019-03-20 PROCEDURE — 99214 OFFICE O/P EST MOD 30 MIN: CPT | Performed by: NURSE PRACTITIONER

## 2019-03-20 RX ORDER — ESCITALOPRAM OXALATE 20 MG/1
20 TABLET ORAL DAILY
Qty: 30 TAB | Refills: 2 | Status: SHIPPED | OUTPATIENT
Start: 2019-03-20 | End: 2019-04-22 | Stop reason: SDUPTHER

## 2019-03-20 NOTE — PROGRESS NOTES
Subjective:     Ramsey Allen is a 43 y.o. male who presents with anxiety and insomnia.    HPI:   Seen in f/u anxiety and insomnia.  He was started on cymbalta to go with the buspar 3 weeks ago.  It is helping his anxiety some.  He is sleeping longer.  He is less anxious but more drowsy on the cymbalta.  He is still on the buspar.  Doing better with anxiety.    He is still using the CPAP.  He is marianela ok with going to sleep .  If he wakes at nite  He will find that the mask is off.  Wants to try larger mask.   Reviewed lab with pt.  His CMP, GFR, testosterone, alb/cr ratio, D is wnl  CBC is wnl except wbc overall is sl low at 4.1.  No recurrent infection.    LP shows trg and LDL is at goal.  HDL is low at 41 but up from 31.    Patient Active Problem List    Diagnosis Date Noted   • Obesity (BMI 30-39.9) 12/14/2016   • Left-sided tinnitus 11/11/2016   • Vitamin d deficiency 03/20/2012   • Preventative health care 09/21/2011   • Recurrent sinusitis    • Post traumatic stress disorder (PTSD)    • Low back pain 07/08/2010   • HTN (hypertension) 04/28/2010   • Anxiety 10/21/2009   • Erectile dysfunction 08/04/2009       Current medicines (including changes today)  Current Outpatient Prescriptions   Medication Sig Dispense Refill   • escitalopram (LEXAPRO) 20 MG tablet Take 1 Tab by mouth every day. 30 Tab 2   • busPIRone (BUSPAR) 15 MG tablet TAKE 1 TABLET BY MOUTH TWICE A  Tab 0   • DULoxetine (CYMBALTA) 30 MG Cap DR Particles Take 1 Cap by mouth every day. 30 Cap 1   • doxazosin (CARDURA) 1 MG Tab Take 1 Tab by mouth every day. 30 Tab 1   • tadalafil (CIALIS) 10 MG tablet Take 1 Tab by mouth as needed for Erectile Dysfunction. 6 Tab 6   • ANDROGEL PUMP 20.25 MG/ACT (1.62%) Gel Apply 40.5 mg to affected area(s) every day for 90 days. 4 Bottle 1   • montelukast (SINGULAIR) 10 MG Tab Take 1 Tab by mouth every day. 30 Tab 4   • lisinopril (PRINIVIL, ZESTRIL) 30 MG tablet Take 1 Tab by mouth every day. 90  "Tab 1   • Cholecalciferol (VITAMIN D) 2000 units Cap Take 1 Cap by mouth every day. 30 Cap 0   • Loratadine (CLARITIN) 10 MG CAPS Take  by mouth.       No current facility-administered medications for this visit.        Allergies   Allergen Reactions   • Erythromycin    • Ilosone [Erythromycin Estolate]        ROS  Constitutional: Negative. Negative for fever, chills, weight loss, malaise/fatigue and diaphoresis.   HENT: Negative. Negative for hearing loss, ear pain, nosebleeds, congestion, sore throat, neck pain, tinnitus and ear discharge.   Respiratory: Negative. Negative for cough, hemoptysis, sputum production, shortness of breath, wheezing and stridor.   Cardiovascular: Negative. Negative for chest pain, palpitations, orthopnea, claudication, leg swelling and PND.   Gastrointestinal: Denies nausea, vomiting, diarrhea, constipation, heartburn, melena or hematochezia.  Genitourinary: Denies dysuria, hematuria, urinary incontinence, frequency or urgency.        Objective:     Blood pressure 132/90, pulse 88, temperature 37.1 °C (98.7 °F), temperature source Temporal, height 1.778 m (5' 10\"), weight 93.4 kg (206 lb), SpO2 99 %. Body mass index is 29.56 kg/m².    Physical Exam:  Vitals reviewed.  Constitutional: Oriented to person, place, and time. appears well-developed and well-nourished. No distress.   Cardiovascular: Normal rate, regular rhythm, normal heart sounds and intact distal pulses. Exam reveals no gallop and no friction rub. No murmur heard. No carotid bruits.   Pulmonary/Chest: Effort normal and breath sounds normal. No stridor. No respiratory distress. no wheezes or rales. exhibits no tenderness.   Musculoskeletal: Normal range of motion. exhibits no edema. otoniel pedal pulses 2+.  Lymphadenopathy: No cervical or supraclavicular adenopathy.   Neurological: Alert and oriented to person, place, and time. exhibits normal muscle tone.  Skin: Skin is warm and dry. No diaphoresis.   Psychiatric: Normal mood " and affect. Behavior is normal.      Assessment and Plan:     The following treatment plan was discussed:    1. Low HDL (under 40)      HDL much improved with diet and exercise.     2. Anxiety  escitalopram (LEXAPRO) 20 MG tablet    not well controlled.  change to lexapro 20 mg daily   3. Primary insomnia      continue insomnia   4. Hypogonadism in male      stable on med   5. Leukopenia, unspecified type      overall wbc low only.  no sx of infection or fever.  will continue to monitor         Followup: Return in about 4 weeks (around 4/17/2019).

## 2019-03-30 NOTE — MR AVS SNAPSHOT
"        Ramsey Allen   2017 1:45 PM   Office Visit   MRN: [id number]    Department:  [facility]   Dept Phone:  [phone]    Description:  Male : 1975   Provider:  DAVID Quezada           Allergies as of 2017     Allergen Noted Reactions    Erythromycin 2008       Ilosone [Erythromycin Estolate] 2008         You were diagnosed with     Essential hypertension   [3932783]   controlled and stable on meds.     Anxiety   [886999]   stable on meds.  having some wt gain.  will monitor.  chg meds if continues    Left-sided tinnitus   [328104]   refer ENT    Leukopenia, unspecified type   [0214918]   check lab with d, cbc and testosterone.  f/u with pt with all test results and  call for lab slip    Vitamin D deficiency   [2514030]       Other male erectile dysfunction   [1309514]       History of hypogonadism   [981654]         Vital Signs     Blood Pressure Pulse Temperature Height Weight Body Mass Index    118/86 mmHg 81 36.2 °C (97.2 °F) 1.778 m (5' 10\") 98.884 kg (218 lb) 31.28 kg/m2    Oxygen Saturation Smoking Status                98% Former Smoker          Basic Information     Date Of Birth Sex Race Ethnicity Preferred Language    1975 Male White Non- English      Problem List              ICD-10-CM Priority Class Noted - Resolved    Erectile dysfunction N52.9   2009 - Present    Anxiety F41.9   10/21/2009 - Present    HTN (hypertension) I10   2010 - Present    Low back pain M54.5   2010 - Present    Recurrent sinusitis J32.9   Unknown - Present    Post traumatic stress disorder (PTSD) F43.10   Unknown - Present    Preventative health care Z00.00   2011 - Present    Vitamin d deficiency    3/20/2012 - Present    Left-sided tinnitus H93.12   2016 - Present    Obesity (BMI 30-39.9) E66.9   2016 - Present      Health Maintenance        Date Due Completion Dates    IMM DTaP/Tdap/Td Vaccine (1 - Tdap) 1996 " ---            Current Immunizations     No immunizations on file.      Below and/or attached are the medications your provider expects you to take. Review all of your home medications and newly ordered medications with your provider and/or pharmacist. Follow medication instructions as directed by your provider and/or pharmacist. Please keep your medication list with you and share with your provider. Update the information when medications are discontinued, doses are changed, or new medications (including over-the-counter products) are added; and carry medication information at all times in the event of emergency situations     Allergies:  ERYTHROMYCIN - (reactions not documented)     ILOSONE - (reactions not documented)               Medications  Valid as of: April 20, 2017 -  2:28 PM    Generic Name Brand Name Tablet Size Instructions for use    Ergocalciferol (Cap) DRISDOL 51221 UNITS TAKE 1 CAP BY MOUTH EVERY 7 DAYS.        Lisinopril (Tab) PRINIVIL 20 MG Take 1 Tab by mouth every day. TAKE 1 TAB BY MOUTH EVERY DAY.        Loratadine (Cap) Loratadine 10 MG Take  by mouth.        Sertraline HCl (Tab) ZOLOFT 50 MG Take 1 Tab by mouth every day.        .                 Medicines prescribed today were sent to:     [facility]/PHARMACY #[id number] [place], [place] - [address] [address] [facility] [address]    Phone: [phone] Fax: [phone]    Open 24 Hours?: No      Medication refill instructions:       If your prescription bottle indicates you have medication refills left, it is not necessary to call your provider’s office. Please contact your pharmacy and they will refill your medication.    If your prescription bottle indicates you do not have any refills left, you may request refills at any time through one of the following ways: The online [employer] system (except Urgent Care), by calling your provider’s office, or by asking your pharmacy to contact your provider’s office with a refill request. Medication refills  are processed only during regular business hours and may not be available until the next business day. Your provider may request additional information or to have a follow-up visit with you prior to refilling your medication.   *Please Note: Medication refills are assigned a new Rx number when refilled electronically. Your pharmacy may indicate that no refills were authorized even though a new prescription for the same medication is available at the pharmacy. Please request the medicine by name with the pharmacy before contacting your provider for a refill.        Your To Do List     Future Labs/Procedures Complete By Expires    CBC WITH DIFFERENTIAL  As directed 4/21/2018    TESTOSTERONE F&T MALE ADULT  As directed 4/20/2018    VITAMIN D,25 HYDROXY  As directed 4/21/2018      Referral     A referral request has been sent to our patient care coordination department. Please allow 3-5 business days for us to process this request and contact you either by phone or mail. If you do not hear from us by the 5th business day, please call us at [phone].           [employer] Access Code: Activation code not generated  Current [employer] Status: Active           No difficulties

## 2019-04-22 ENCOUNTER — OFFICE VISIT (OUTPATIENT)
Dept: MEDICAL GROUP | Facility: MEDICAL CENTER | Age: 44
End: 2019-04-22
Payer: COMMERCIAL

## 2019-04-22 VITALS
SYSTOLIC BLOOD PRESSURE: 130 MMHG | OXYGEN SATURATION: 98 % | TEMPERATURE: 97.7 F | BODY MASS INDEX: 29.49 KG/M2 | HEIGHT: 70 IN | WEIGHT: 206 LBS | DIASTOLIC BLOOD PRESSURE: 90 MMHG | HEART RATE: 75 BPM

## 2019-04-22 DIAGNOSIS — J32.9 CHRONIC CONGESTION OF PARANASAL SINUS: ICD-10-CM

## 2019-04-22 DIAGNOSIS — F41.9 ANXIETY: ICD-10-CM

## 2019-04-22 DIAGNOSIS — G47.30 SLEEP APNEA, UNSPECIFIED TYPE: ICD-10-CM

## 2019-04-22 PROCEDURE — 99214 OFFICE O/P EST MOD 30 MIN: CPT | Performed by: NURSE PRACTITIONER

## 2019-04-22 RX ORDER — ESCITALOPRAM OXALATE 20 MG/1
30 TABLET ORAL DAILY
Qty: 45 TAB | Refills: 2 | Status: SHIPPED | OUTPATIENT
Start: 2019-04-22 | End: 2019-07-30 | Stop reason: SDUPTHER

## 2019-04-22 NOTE — PROGRESS NOTES
Subjective:     Ramsey Allen is a 43 y.o. male who presents with ANxiety.    HPI:   Seen in f/u for anxiety.  He was changed to lexapro last month.  He is having more energy.  Sleeping better.  Still having some anxiety.  The weather is better so he can beoutside more.  He is still having anxiety.  He is better but not where he wants to be yet. He is using self help techniques.  He downloaded a heidy that teaches meditation and mindfulness. That is helping.  Counseling has not helped in the past.  His panic attacks are not lasting as long. He is also on buspar  He received a new mask for his sleep apnea.  It it better but not perfect.  He is trying to use as much as poss.  He was initially sleeping only 1-2 hrs at a time.  Now he is sleeping 5-6 hr a nite.  He is sitll having chronic sinus congestion that could contribute to his apnea.  He has seen tika in the past.  He saw dr feliz last year for ears        Patient Active Problem List    Diagnosis Date Noted   • Obesity (BMI 30-39.9) 12/14/2016   • Left-sided tinnitus 11/11/2016   • Vitamin d deficiency 03/20/2012   • Preventative health care 09/21/2011   • Recurrent sinusitis    • Post traumatic stress disorder (PTSD)    • Low back pain 07/08/2010   • HTN (hypertension) 04/28/2010   • Anxiety 10/21/2009   • Erectile dysfunction 08/04/2009       Current medicines (including changes today)  Current Outpatient Prescriptions   Medication Sig Dispense Refill   • escitalopram (LEXAPRO) 20 MG tablet Take 1.5 Tabs by mouth every day. 45 Tab 2   • busPIRone (BUSPAR) 15 MG tablet TAKE 1 TABLET BY MOUTH TWICE A  Tab 3   • doxazosin (CARDURA) 1 MG Tab Take 1 Tab by mouth every day. 30 Tab 1   • tadalafil (CIALIS) 10 MG tablet Take 1 Tab by mouth as needed for Erectile Dysfunction. 6 Tab 6   • ANDROGEL PUMP 20.25 MG/ACT (1.62%) Gel Apply 40.5 mg to affected area(s) every day for 90 days. 4 Bottle 1   • montelukast (SINGULAIR) 10 MG Tab Take 1 Tab by mouth  "every day. 30 Tab 4   • lisinopril (PRINIVIL, ZESTRIL) 30 MG tablet Take 1 Tab by mouth every day. 90 Tab 1   • Cholecalciferol (VITAMIN D) 2000 units Cap Take 1 Cap by mouth every day. 30 Cap 0   • Loratadine (CLARITIN) 10 MG CAPS Take  by mouth.       No current facility-administered medications for this visit.        Allergies   Allergen Reactions   • Erythromycin    • Ilosone [Erythromycin Estolate]        ROS  Constitutional: Negative. Negative for fever, chills, weight loss, malaise/fatigue and diaphoresis.   HENT: Negative. Negative for hearing loss, ear pain, nosebleeds, congestion, sore throat, neck pain, tinnitus and ear discharge.   Respiratory: Negative. Negative for cough, hemoptysis, sputum production, shortness of breath, wheezing and stridor.   Cardiovascular: Negative. Negative for chest pain, palpitations, orthopnea, claudication, leg swelling and PND.   Gastrointestinal: Denies nausea, vomiting, diarrhea, constipation, heartburn, melena or hematochezia.  Genitourinary: Denies dysuria, hematuria, urinary incontinence, frequency or urgency.        Objective:     /90 (BP Location: Right arm, Patient Position: Sitting)   Pulse 75   Temp 36.5 °C (97.7 °F) (Temporal)   Ht 1.778 m (5' 10\")   Wt 93.4 kg (206 lb)   SpO2 98%  Body mass index is 29.56 kg/m².    Physical Exam:  Vitals reviewed.  Constitutional: Oriented to person, place, and time. appears well-developed and well-nourished. No distress.   Cardiovascular: Normal rate, regular rhythm, normal heart sounds and intact distal pulses. Exam reveals no gallop and no friction rub. No murmur heard. No carotid bruits.   Pulmonary/Chest: Effort normal and breath sounds normal. No stridor. No respiratory distress. no wheezes or rales. exhibits no tenderness.   Musculoskeletal: Normal range of motion. exhibits no edema. otoniel pedal pulses 2+.  Lymphadenopathy: No cervical or supraclavicular adenopathy.   Neurological: Alert and oriented to person, " place, and time. exhibits normal muscle tone.  Skin: Skin is warm and dry. No diaphoresis.   Psychiatric: Normal mood and affect. Behavior is normal.      Assessment and Plan:     The following treatment plan was discussed:    1. Anxiety  escitalopram (LEXAPRO) 20 MG tablet    not well controlled.  increase lexapro to 30 mg daily  f/u in 1 month if not improved or can call for psychiatry appt.  o/w f/u 3 months   2. Sleep apnea, unspecified type  REFERRAL TO ENT    continue on sleep apnea tx   3. Chronic congestion of paranasal sinus  REFERRAL TO ENT    refer ENT to see if chronic sinus congestion is contributing to sleep apnea sx.          Followup: Return in about 3 months (around 7/22/2019).

## 2019-05-12 ENCOUNTER — OFFICE VISIT (OUTPATIENT)
Dept: URGENT CARE | Facility: PHYSICIAN GROUP | Age: 44
End: 2019-05-12
Payer: COMMERCIAL

## 2019-05-12 VITALS
WEIGHT: 206 LBS | RESPIRATION RATE: 16 BRPM | BODY MASS INDEX: 29.49 KG/M2 | OXYGEN SATURATION: 98 % | HEART RATE: 90 BPM | HEIGHT: 70 IN | DIASTOLIC BLOOD PRESSURE: 60 MMHG | TEMPERATURE: 99.1 F | SYSTOLIC BLOOD PRESSURE: 118 MMHG

## 2019-05-12 DIAGNOSIS — R10.9 BELLY PAIN: ICD-10-CM

## 2019-05-12 PROCEDURE — 99214 OFFICE O/P EST MOD 30 MIN: CPT | Performed by: FAMILY MEDICINE

## 2019-05-12 RX ORDER — CIPROFLOXACIN 500 MG/1
500 TABLET, FILM COATED ORAL 2 TIMES DAILY
Qty: 14 TAB | Refills: 0 | Status: SHIPPED | OUTPATIENT
Start: 2019-05-12 | End: 2019-05-19

## 2019-05-12 RX ORDER — METRONIDAZOLE 500 MG/1
500 TABLET ORAL 3 TIMES DAILY
Qty: 21 TAB | Refills: 0 | Status: SHIPPED | OUTPATIENT
Start: 2019-05-12 | End: 2019-05-19

## 2019-05-12 ASSESSMENT — ENCOUNTER SYMPTOMS: ABDOMINAL PAIN: 1

## 2019-05-12 ASSESSMENT — PAIN SCALES - GENERAL: PAINLEVEL: 8=MODERATE-SEVERE PAIN

## 2019-05-12 NOTE — LETTER
May 12, 2019         Patient: Ramsey Allen   YOB: 1975   Date of Visit: 5/12/2019           To Whom it May Concern:    Ramsey Allen was seen in my clinic on 5/12/2019. He may return to work in 2-3 days.    If you have any questions or concerns, please don't hesitate to call.        Sincerely,           Hubert Tong M.D.  Electronically Signed

## 2019-05-12 NOTE — PROGRESS NOTES
Subjective:      Ramsey Allen is a 44 y.o. male who presents with Abdominal Pain (left sided abd pain, pain is sharp,x 3 days with pain increasing on left side Pt worried about diverticulitis)      - This is a pleasant and non toxic appearing 44 y.o. male with LLQ pain x 2-3 days. Worse today. No stool changes or bloody stool or NVFC. No urinary symptoms.           ALLERGIES:  Erythromycin and Ilosone [erythromycin estolate]     PMH:  Past Medical History:   Diagnosis Date   • Anxiety    • ED (erectile dysfunction)    • Hypertension    • LBP (low back pain)    • Meningitis    • Post traumatic stress disorder (PTSD)    • Recurrent sinusitis         PSH:  Past Surgical History:   Procedure Laterality Date   • SEPTOTURBINOPLASTY  8/22/08    Performed by LEIDA SOLOMON at [facility] ORS   • ANTROSTOMY  8/22/08    Performed by LEIDA SOLOMON at [facility] ORS   • SINUSCOPY  8/22/08    Performed by LEIDA SOLOMON at [facility] ORS       MEDS:    Current Outpatient Prescriptions:   •  ciprofloxacin (CIPRO) 500 MG Tab, Take 1 Tab by mouth 2 times a day for 7 days., Disp: 14 Tab, Rfl: 0  •  metroNIDAZOLE (FLAGYL) 500 MG Tab, Take 1 Tab by mouth 3 times a day for 7 days., Disp: 21 Tab, Rfl: 0  •  escitalopram (LEXAPRO) 20 MG tablet, Take 1.5 Tabs by mouth every day., Disp: 45 Tab, Rfl: 2  •  busPIRone (BUSPAR) 15 MG tablet, TAKE 1 TABLET BY MOUTH TWICE A DAY, Disp: 180 Tab, Rfl: 3  •  doxazosin (CARDURA) 1 MG Tab, Take 1 Tab by mouth every day., Disp: 30 Tab, Rfl: 1  •  ANDROGEL PUMP 20.25 MG/ACT (1.62%) Gel, Apply 40.5 mg to affected area(s) every day for 90 days., Disp: 4 Bottle, Rfl: 1  •  montelukast (SINGULAIR) 10 MG Tab, Take 1 Tab by mouth every day., Disp: 30 Tab, Rfl: 4  •  lisinopril (PRINIVIL, ZESTRIL) 30 MG tablet, Take 1 Tab by mouth every day., Disp: 90 Tab, Rfl: 1  •  Cholecalciferol (VITAMIN D) 2000 units Cap, Take 1 Cap by mouth every day., Disp: 30 Cap,  "Rfl: 0  •  Loratadine (CLARITIN) 10 MG CAPS, Take  by mouth., Disp: , Rfl:   •  tadalafil (CIALIS) 10 MG tablet, Take 1 Tab by mouth as needed for Erectile Dysfunction., Disp: 6 Tab, Rfl: 6    ** I have documented what I find to be significant in regards to past medical, social, family and surgical history  in my HPI or under PMH/PSH/FH review section, otherwise it is contributory **         HPI    Review of Systems   Gastrointestinal: Positive for abdominal pain.   All other systems reviewed and are negative.         Objective:     /60   Pulse 90   Temp 37.3 °C (99.1 °F)   Resp 16   Ht 1.778 m (5' 10\")   Wt 93.4 kg (206 lb)   SpO2 98%   BMI 29.56 kg/m²      Physical Exam   Constitutional: He appears well-developed. No distress.   HENT:   Head: Normocephalic and atraumatic.   Mouth/Throat: Oropharynx is clear and moist.   Cardiovascular: Regular rhythm.    No murmur heard.  Pulmonary/Chest: Effort normal. No respiratory distress.   Abdominal: Soft. He exhibits no distension. There is tenderness ( LLQ). There is no rebound and no guarding.   Neurological: He is alert. He exhibits normal muscle tone.   Skin: Skin is warm and dry.   Psychiatric: He has a normal mood and affect. Judgment normal.   Nursing note and vitals reviewed.              Assessment/Plan:         1. Belly pain  ciprofloxacin (CIPRO) 500 MG Tab    metroNIDAZOLE (FLAGYL) 500 MG Tab             Dx & d/c instructions discussed w/ patient and/or family members.     ER precautions (worsening signs symptoms and when to go to ER) discussed.    Follow up here or PCP or ER in 2-3 days if symptoms not improving, ER if feeling/getting worse.    Any realistic/common medication side effects (i.e. Rash, GI upset/constipation, sedation, elevation of BP or blood sugars) discussed.     Patient left in stable condition              "

## 2019-06-13 DIAGNOSIS — J30.1 CHRONIC ALLERGIC RHINITIS DUE TO POLLEN: ICD-10-CM

## 2019-06-13 DIAGNOSIS — F41.9 ANXIETY: ICD-10-CM

## 2019-06-13 RX ORDER — ESCITALOPRAM OXALATE 20 MG/1
TABLET ORAL
Qty: 30 TAB | Refills: 2 | Status: SHIPPED | OUTPATIENT
Start: 2019-06-13 | End: 2019-07-30

## 2019-06-13 RX ORDER — MONTELUKAST SODIUM 10 MG/1
TABLET ORAL
Qty: 30 TAB | Refills: 2 | Status: SHIPPED | OUTPATIENT
Start: 2019-06-13 | End: 2019-09-26 | Stop reason: SDUPTHER

## 2019-06-25 DIAGNOSIS — I10 ESSENTIAL HYPERTENSION: ICD-10-CM

## 2019-06-25 RX ORDER — DOXAZOSIN MESYLATE 1 MG/1
TABLET ORAL
Qty: 90 TAB | Refills: 1 | Status: SHIPPED | OUTPATIENT
Start: 2019-06-25 | End: 2020-03-23

## 2019-07-19 DIAGNOSIS — E29.1 HYPOGONADISM IN MALE: ICD-10-CM

## 2019-07-21 RX ORDER — TESTOSTERONE 16.2 MG/G
GEL TRANSDERMAL
Qty: 75 G | Refills: 1 | Status: SHIPPED | OUTPATIENT
Start: 2019-07-21 | End: 2019-07-27 | Stop reason: SDUPTHER

## 2019-07-30 ENCOUNTER — OFFICE VISIT (OUTPATIENT)
Dept: MEDICAL GROUP | Facility: MEDICAL CENTER | Age: 44
End: 2019-07-30
Payer: COMMERCIAL

## 2019-07-30 VITALS
HEART RATE: 79 BPM | SYSTOLIC BLOOD PRESSURE: 146 MMHG | WEIGHT: 209 LBS | HEIGHT: 70 IN | TEMPERATURE: 97.3 F | DIASTOLIC BLOOD PRESSURE: 98 MMHG | BODY MASS INDEX: 29.92 KG/M2 | OXYGEN SATURATION: 97 %

## 2019-07-30 DIAGNOSIS — F41.9 ANXIETY: ICD-10-CM

## 2019-07-30 PROCEDURE — 99214 OFFICE O/P EST MOD 30 MIN: CPT | Performed by: NURSE PRACTITIONER

## 2019-07-30 RX ORDER — ESCITALOPRAM OXALATE 20 MG/1
30 TABLET ORAL DAILY
Qty: 135 TAB | Refills: 2 | Status: SHIPPED | OUTPATIENT
Start: 2019-07-30 | End: 2020-10-19 | Stop reason: SDUPTHER

## 2019-07-30 NOTE — PROGRESS NOTES
Subjective:     Ramsey Allen is a 44 y.o. male who presents with anxiety.    HPI:   Seen in f/u for anxiety.  He has severe claustrophobia.  He is unable to go to places with lots of people.  At his work he has to do the cash register.  If he is able to do the registers at the ends he is ok.  If he is in the middle cash registers he will have claustrophobia and worse anxiety.  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  He just wants to have FMLA paper done that will allow for  His needs.    Otherwise he is feeling well.  Much happier.      Patient Active Problem List    Diagnosis Date Noted   • Obesity (BMI 30-39.9) 12/14/2016   • Left-sided tinnitus 11/11/2016   • Vitamin d deficiency 03/20/2012   • Preventative health care 09/21/2011   • Recurrent sinusitis    • Post traumatic stress disorder (PTSD)    • Low back pain 07/08/2010   • HTN (hypertension) 04/28/2010   • Anxiety 10/21/2009   • Erectile dysfunction 08/04/2009       Current medicines (including changes today)  Current Outpatient Prescriptions   Medication Sig Dispense Refill   • escitalopram (LEXAPRO) 20 MG tablet Take 1.5 Tabs by mouth every day. 135 Tab 2   • Testosterone (ANDROGEL) 20.25 MG/ACT (1.62%) Gel APPLY 2 PUMPS TOPICALLY AS DIRECTED DAILY * E291 75 g 1   • doxazosin (CARDURA) 1 MG Tab TAKE 1 TABLET BY MOUTH EVERY DAY 90 Tab 1   • montelukast (SINGULAIR) 10 MG Tab TAKE 1 TABLET BY MOUTH EVERY DAY 30 Tab 2   • busPIRone (BUSPAR) 15 MG tablet TAKE 1 TABLET BY MOUTH TWICE A  Tab 3   • tadalafil (CIALIS) 10 MG tablet Take 1 Tab by mouth as needed for Erectile Dysfunction. 6 Tab 6   • lisinopril (PRINIVIL, ZESTRIL) 30 MG tablet Take 1 Tab by mouth every day. 90 Tab 1   • Cholecalciferol (VITAMIN D) 2000 units Cap Take 1 Cap by mouth every day. 30 Cap 0   • Loratadine (CLARITIN) 10 MG CAPS Take  by mouth.       No current facility-administered medications for this visit.   "      Allergies   Allergen Reactions   • Erythromycin    • Ilosone [Erythromycin Estolate]        ROS  Constitutional: Negative. Negative for fever, chills, weight loss, malaise/fatigue and diaphoresis.   HENT: Negative. Negative for hearing loss, ear pain, nosebleeds, congestion, sore throat, neck pain, tinnitus and ear discharge.   Respiratory: Negative. Negative for cough, hemoptysis, sputum production, shortness of breath, wheezing and stridor.   Cardiovascular: Negative. Negative for chest pain, palpitations, orthopnea, claudication, leg swelling and PND.   Gastrointestinal: Denies nausea, vomiting, diarrhea, constipation, heartburn, melena or hematochezia.  Genitourinary: Denies dysuria, hematuria, urinary incontinence, frequency or urgency.        Objective:     /98 (BP Location: Right arm, Patient Position: Sitting)   Pulse 79   Temp 36.3 °C (97.3 °F) (Temporal)   Ht 1.778 m (5' 10\")   Wt 94.8 kg (209 lb)   SpO2 97%  Body mass index is 29.99 kg/m².    Physical Exam:  Vitals reviewed.  Constitutional: Oriented to person, place, and time. appears well-developed and well-nourished. No distress.   Cardiovascular: Normal rate, regular rhythm, normal heart sounds and intact distal pulses. Exam reveals no gallop and no friction rub. No murmur heard. No carotid bruits.   Pulmonary/Chest: Effort normal and breath sounds normal. No stridor. No respiratory distress. no wheezes or rales. exhibits no tenderness.   Musculoskeletal: Normal range of motion. exhibits no edema. otoniel pedal pulses 2+.  Lymphadenopathy: No cervical or supraclavicular adenopathy.   Neurological: Alert and oriented to person, place, and time. exhibits normal muscle tone.  Skin: Skin is warm and dry. No diaphoresis.   Psychiatric: Normal mood and affect. Behavior is normal.      Assessment and Plan:     The following treatment plan was discussed:    1. Anxiety  escitalopram (LEXAPRO) 20 MG tablet    stable on meds.  needs lexapro refill.  " completed ada form.  f/u 3/20 or with new PCP     2. Completed ADA form for pt.     Followup: Return if symptoms worsen or fail to improve.

## 2019-09-26 DIAGNOSIS — J30.1 CHRONIC ALLERGIC RHINITIS DUE TO POLLEN: ICD-10-CM

## 2019-09-26 DIAGNOSIS — F41.9 ANXIETY: ICD-10-CM

## 2019-09-27 RX ORDER — MONTELUKAST SODIUM 10 MG/1
TABLET ORAL
Qty: 90 TAB | Refills: 2 | Status: SHIPPED | OUTPATIENT
Start: 2019-09-27 | End: 2020-10-19 | Stop reason: SDUPTHER

## 2019-09-27 RX ORDER — ESCITALOPRAM OXALATE 20 MG/1
TABLET ORAL
Qty: 90 TAB | Refills: 2 | Status: SHIPPED | OUTPATIENT
Start: 2019-09-27 | End: 2020-08-03 | Stop reason: SDUPTHER

## 2019-10-23 ENCOUNTER — OFFICE VISIT (OUTPATIENT)
Dept: URGENT CARE | Facility: PHYSICIAN GROUP | Age: 44
End: 2019-10-23
Payer: COMMERCIAL

## 2019-10-23 VITALS
WEIGHT: 200 LBS | DIASTOLIC BLOOD PRESSURE: 86 MMHG | SYSTOLIC BLOOD PRESSURE: 138 MMHG | TEMPERATURE: 97 F | RESPIRATION RATE: 16 BRPM | HEART RATE: 87 BPM | BODY MASS INDEX: 28.63 KG/M2 | HEIGHT: 70 IN | OXYGEN SATURATION: 97 %

## 2019-10-23 DIAGNOSIS — K12.2 UVULITIS: ICD-10-CM

## 2019-10-23 DIAGNOSIS — H66.92 LEFT ACUTE OTITIS MEDIA: ICD-10-CM

## 2019-10-23 PROCEDURE — 99214 OFFICE O/P EST MOD 30 MIN: CPT | Performed by: FAMILY MEDICINE

## 2019-10-23 RX ORDER — PREDNISONE 20 MG/1
TABLET ORAL
Qty: 6 TAB | Refills: 0 | Status: SHIPPED | OUTPATIENT
Start: 2019-10-23 | End: 2020-08-03

## 2019-10-23 RX ORDER — CEFDINIR 300 MG/1
300 CAPSULE ORAL 2 TIMES DAILY
Qty: 20 CAP | Refills: 0 | Status: SHIPPED | OUTPATIENT
Start: 2019-10-23 | End: 2019-11-02

## 2019-10-23 NOTE — PATIENT INSTRUCTIONS
.Follow up if not significantly improved as expected, sooner if any worsening or new symptoms    Otitis Media, Adult  Otitis media is redness, soreness, and puffiness (swelling) in the space just behind your eardrum (middle ear). It may be caused by allergies or infection. It often happens along with a cold.  Follow these instructions at home:  · Take your medicine as told. Finish it even if you start to feel better.  · Only take over-the-counter or prescription medicines for pain, discomfort, or fever as told by your doctor.  · Follow up with your doctor as told.  Contact a doctor if:  · You have otitis media only in one ear, or bleeding from your nose, or both.  · You notice a lump on your neck.  · You are not getting better in 3-5 days.  · You feel worse instead of better.  Get help right away if:  · You have pain that is not helped with medicine.  · You have puffiness, redness, or pain around your ear.  · You get a stiff neck.  · You cannot move part of your face (paralysis).  · You notice that the bone behind your ear hurts when you touch it.  This information is not intended to replace advice given to you by your health care provider. Make sure you discuss any questions you have with your health care provider.  Document Released: 06/05/2009 Document Revised: 05/25/2017 Document Reviewed: 07/15/2014  [employer] Interactive Patient Education © 2017 [employer]    Uvulitis  Uvulitis is infection or inflammation of the uvula. The uvula is the small, finger-like piece of tissue that hangs down at the back of your throat.  What are the causes?  This condition may be caused by:  · An infection in the mouth or throat. This is the most common cause.  · Trauma to the uvula. Causes of trauma include burning your mouth and heavy snoring.  · Fluid build-up (edema). Edema can be triggered be an allergic reaction. Uvulitis that is caused by edema is called Quincke disease.  · Inhaling irritants, such as chemical agents,  smoke, or steam.  What are the signs or symptoms?  Symptoms of this condition depend on the cause.  Symptoms of uvulitis that is caused by infection include:  · Red, swollen uvula.  · Sore throat.  · Fever.  · Headache.  · Swollen neck glands.  Symptoms of uvulitis that is caused by trauma, edema, or irritation include:  · Red, swollen uvula.  · Sore throat.  · Trouble swallowing.  · Choking or gagging.  · Trouble breathing.  How is this diagnosed?  This condition is diagnosed with a physical exam. You also may have tests, such as a throat culture and blood tests.  How is this treated?  Treatment for this condition depends on the cause. Treatment may involve:  · Antibiotic medicine. Antibiotics may be prescribed if a bacterial infection is the cause.  · Steroid medicine. Steroids may be given if edema is the cause.  · Surgery to remove part of the uvula (partial uvulectomy).  Follow these instructions at home:  · Rest as much as possible until your condition improves.  · Drink enough fluid to keep your urine clear or pale yellow.  · Take over-the-counter and prescription medicines only as told by your health care provider.  · If you were prescribed an antibiotic medicine, take it as told by your health care provider. Do not stop taking the antibiotic even if you start to feel better.  · Use a cool-mist humidifier to ease irritation in your throat.  · While your throat is sore:  ¨ Eat soft foods or drink liquids, such as soup.  ¨ Gargle with a salt-water mixture 3-4 times per day or as needed. To make a salt-water mixture, completely dissolve ½-1 tsp of salt in 1 cup of warm water.  · Keep all follow-up visits as told by your health care provider. This is important.  Contact a health care provider if:  · You have a fever.  · You have trouble eating.  · Your symptoms do not get better.  · Your symptoms come back after treatment.  Get help right away if:  · You have trouble breathing.  · You have trouble  swallowing.  This information is not intended to replace advice given to you by your health care provider. Make sure you discuss any questions you have with your health care provider.  Document Released: 07/28/2005 Document Revised: 08/20/2017 Document Reviewed: 03/09/2016  [employer] Interactive Patient Education © 2017 [employer]

## 2019-10-23 NOTE — LETTER
October 23, 2019         Patient: Ramsey Allen   YOB: 1975   Date of Visit: 10/23/2019           To Whom it May Concern:    Ramsey Allen was seen in my clinic on 10/23/2019. He may return to work on 10/25/2019..    If you have any questions or concerns, please don't hesitate to call.        Sincerely,           Marissa Dumont M.D.  Electronically Signed

## 2019-10-24 NOTE — PROGRESS NOTES
"Subjective:      Ramsey Allen is a 44 y.o. male who presents with Otalgia (congestion, sinus [id number] )            This is a new problem.  44-year-old presenting for evaluation of sore throat and congestion for the past 5 days.  Over the course of the past 24 hours has noticed left ear pain.  No fever recently reported but has felt feverish initially.  No strep or mono exposure..      Review of Systems   All other systems reviewed and are negative.         Objective:     /86   Pulse 87   Temp 36.1 °C (97 °F) (Temporal)   Resp 16   Ht 1.778 m (5' 10\")   Wt 90.7 kg (200 lb)   SpO2 97%   BMI 28.70 kg/m²      Physical Exam   Constitutional: He is oriented to person, place, and time. He appears well-developed and well-nourished.  Non-toxic appearance. No distress.   HENT:   Head: Normocephalic and atraumatic.   Right Ear: Hearing, external ear and ear canal normal.   Left Ear: External ear and ear canal normal. Tympanic membrane is erythematous.   Nose: No rhinorrhea.   Mouth/Throat: Uvula is midline. No trismus in the jaw. Uvula swelling (Mild uvular swelling and erythema) present. Posterior oropharyngeal erythema present. No oropharyngeal exudate, posterior oropharyngeal edema or tonsillar abscesses. No tonsillar exudate.   Eyes: Conjunctivae are normal.   Neck: Neck supple.   Cardiovascular: Normal rate and regular rhythm. Exam reveals no gallop and no friction rub.   No murmur heard.  Pulmonary/Chest: Effort normal. No stridor. No respiratory distress. He has no wheezes.   Lymphadenopathy:     He has no cervical adenopathy.   Neurological: He is alert and oriented to person, place, and time.   Skin: Skin is warm. No rash noted. He is not diaphoretic. No erythema. No pallor.   Psychiatric: He has a normal mood and affect.               Assessment/Plan:     1. Uvulitis  - predniSONE (DELTASONE) 20 MG Tab; 60mg daily for 2 days  Dispense: 6 Tab; Refill: 0  - cefdinir (OMNICEF) 300 MG Cap; Take " 1 Cap by mouth 2 times a day for 10 days.  Dispense: 20 Cap; Refill: 0    2. Left acute otitis media  - cefdinir (OMNICEF) 300 MG Cap; Take 1 Cap by mouth 2 times a day for 10 days.  Dispense: 20 Cap; Refill: 0      Continue symptomatic care  Plan per orders and instructions  Warning signs reviewed

## 2020-03-21 DIAGNOSIS — I10 ESSENTIAL HYPERTENSION: ICD-10-CM

## 2020-03-21 NOTE — LETTER
March 24, 2020        Ramsey Allen  [address]  [facility] [address]        Dear Ramsey:    We have received a request from your pharmacy to refill your prescription(s). After careful review of your chart, we have noted you are due for labs and a follow up appointment.  We request you call our office at [phone] at your earliest convenience and make an appointment. I have included a fasting lab order for you.    However, when patients are not followed closely by their physician, potential medication complications may go unadressed. We look forward to scheduling an appointment for you, so that we may provide you with the safest and most complete medical care.        If you have any questions or concerns, please don't hesitate to call.        Sincerely,        CONNOR Gusman.    Electronically Signed

## 2020-03-23 RX ORDER — DOXAZOSIN MESYLATE 1 MG/1
TABLET ORAL
Qty: 90 TAB | Refills: 0 | Status: SHIPPED | OUTPATIENT
Start: 2020-03-23 | End: 2020-08-17

## 2020-04-06 DIAGNOSIS — E29.1 HYPOGONADISM, MALE: ICD-10-CM

## 2020-04-06 RX ORDER — TESTOSTERONE 16.2 MG/G
GEL TRANSDERMAL
Qty: 75 G | Refills: 0 | Status: SHIPPED
Start: 2020-04-06 | End: 2020-08-17 | Stop reason: SDUPTHER

## 2020-04-06 NOTE — LETTER
April 6, 2020        Ramsey Allen  [address]  [facility] [address]        Dear Ramsey:    We have received a request from your pharmacy to refill your prescription(s). After careful review of your chart, we have noted you are due for labs and a follow up appointment.  We request you call our office at [phone] at your earliest convenience and make an appointment. I have included a fasting lab order for you.    However, when patients are not followed closely by their physician, potential medication complications may go unadressed. We look forward to scheduling an appointment for you, so that we may provide you with the safest and most complete medical care.        If you have any questions or concerns, please don't hesitate to call.        Sincerely,        CONNOR Gusman.    Electronically Signed

## 2020-06-29 ENCOUNTER — TELEPHONE (OUTPATIENT)
Dept: MEDICAL GROUP | Facility: MEDICAL CENTER | Age: 45
End: 2020-06-29

## 2020-06-29 NOTE — TELEPHONE ENCOUNTER
----- Message from Leonora Lira sent at 6/29/2020 12:02 PM PDT -----  Regarding: Claudine Pt - Work Letter request  Pt came to office and is requesting a letter for his employer stating that due to any of his medical condition, he does not need to wear a mask and that a face shield will suffice. Please call pt for any questions and notify him if the letter is ready.   Thank you,  Ольга

## 2020-06-30 ENCOUNTER — APPOINTMENT (OUTPATIENT)
Dept: MEDICAL GROUP | Facility: MEDICAL CENTER | Age: 45
End: 2020-06-30
Payer: COMMERCIAL

## 2020-07-06 RX ORDER — BUSPIRONE HYDROCHLORIDE 15 MG/1
TABLET ORAL
Qty: 30 TAB | Refills: 0 | Status: SHIPPED | OUTPATIENT
Start: 2020-07-06 | End: 2020-10-19 | Stop reason: SDUPTHER

## 2020-08-03 ENCOUNTER — OFFICE VISIT (OUTPATIENT)
Dept: MEDICAL GROUP | Facility: MEDICAL CENTER | Age: 45
End: 2020-08-03
Payer: COMMERCIAL

## 2020-08-03 VITALS
SYSTOLIC BLOOD PRESSURE: 130 MMHG | DIASTOLIC BLOOD PRESSURE: 100 MMHG | HEIGHT: 70 IN | TEMPERATURE: 97.1 F | WEIGHT: 225 LBS | HEART RATE: 85 BPM | BODY MASS INDEX: 32.21 KG/M2 | OXYGEN SATURATION: 97 %

## 2020-08-03 DIAGNOSIS — F41.9 ANXIETY: ICD-10-CM

## 2020-08-03 PROCEDURE — 99214 OFFICE O/P EST MOD 30 MIN: CPT | Performed by: NURSE PRACTITIONER

## 2020-08-03 RX ORDER — ESCITALOPRAM OXALATE 20 MG/1
20 TABLET ORAL
Qty: 90 TAB | Refills: 0 | Status: SHIPPED | OUTPATIENT
Start: 2020-08-03 | End: 2020-08-03

## 2020-08-03 ASSESSMENT — PATIENT HEALTH QUESTIONNAIRE - PHQ9
5. POOR APPETITE OR OVEREATING: 2 - MORE THAN HALF THE DAYS
CLINICAL INTERPRETATION OF PHQ2 SCORE: 5

## 2020-08-03 ASSESSMENT — FIBROSIS 4 INDEX: FIB4 SCORE: 0.67

## 2020-08-03 NOTE — PROGRESS NOTES
Subjective:     Ramsey Allen is a 45 y.o. male who presents with panic attacks.    HPI:   Seen in f/u for panic attacks.  He is working but having issues with wearing a face mask.  He will have a panic attack if the masks hits his nose.  Now his work is requiring masks.  He does ok with face shield.  He needs paperwork complete to allow him to wear face shield instead of mask.  He got  in january.  He was physically and mentally strong with healthy lifestyle to do the divorce.  Now he is not on healthy lifestyle with diet and exercise any more.  He is having high anxiety with doing any thing.  He is terrified to go to work.  He is afraid to go out of home otherwise.     Patient Active Problem List    Diagnosis Date Noted   • Obesity (BMI 30-39.9) 12/14/2016   • Left-sided tinnitus 11/11/2016   • Vitamin d deficiency 03/20/2012   • Preventative health care 09/21/2011   • Recurrent sinusitis    • Post traumatic stress disorder (PTSD)    • Low back pain 07/08/2010   • HTN (hypertension) 04/28/2010   • Anxiety 10/21/2009   • Erectile dysfunction 08/04/2009       Current medicines (including changes today)  Current Outpatient Medications   Medication Sig Dispense Refill   • busPIRone (BUSPAR) 15 MG tablet TAKE 1 TABLET BY MOUTH TWICE A DAY 30 Tab 0   • doxazosin (CARDURA) 1 MG Tab TAKE 1 TABLET BY MOUTH EVERY DAY 90 Tab 0   • montelukast (SINGULAIR) 10 MG Tab TAKE 1 TABLET BY MOUTH EVERY DAY 90 Tab 2   • lisinopril (PRINIVIL, ZESTRIL) 30 MG tablet TAKE 1 TABLET BY MOUTH EVERY DAY 90 Tab 2   • escitalopram (LEXAPRO) 20 MG tablet Take 1.5 Tabs by mouth every day. 135 Tab 2   • tadalafil (CIALIS) 10 MG tablet Take 1 Tab by mouth as needed for Erectile Dysfunction. 6 Tab 6   • Cholecalciferol (VITAMIN D) 2000 units Cap Take 1 Cap by mouth every day. 30 Cap 0   • Loratadine (CLARITIN) 10 MG CAPS Take  by mouth.       No current facility-administered medications for this visit.        Allergies   Allergen  "Reactions   • Erythromycin    • Ilosone [Erythromycin Estolate]        ROS  Constitutional: Negative. Negative for fever, chills, weight loss, malaise/fatigue and diaphoresis.   HENT: Negative. Negative for hearing loss, ear pain, nosebleeds, congestion, sore throat, neck pain, tinnitus and ear discharge.   Respiratory: Negative. Negative for cough, hemoptysis, sputum production, shortness of breath, wheezing and stridor.   Cardiovascular: Negative. Negative for chest pain, palpitations, orthopnea, claudication, leg swelling and PND.   Gastrointestinal: Denies nausea, vomiting, diarrhea, constipation, heartburn, melena or hematochezia.  Genitourinary: Denies dysuria, hematuria, urinary incontinence, frequency or urgency.        Objective:     /100 (BP Location: Right arm, Patient Position: Sitting)   Pulse 85   Temp 36.2 °C (97.1 °F) (Temporal)   Ht 1.778 m (5' 10\")   Wt 102.1 kg (225 lb)   SpO2 97%  Body mass index is 32.28 kg/m².    Physical Exam:  Vitals reviewed.  Constitutional: Oriented to person, place, and time. appears well-developed and well-nourished. No distress.   Cardiovascular: Normal rate, regular rhythm, normal heart sounds and intact distal pulses. Exam reveals no gallop and no friction rub. No murmur heard. No carotid bruits.   Pulmonary/Chest: Effort normal and breath sounds normal. No stridor. No respiratory distress. no wheezes or rales. exhibits no tenderness.   Musculoskeletal: Normal range of motion. exhibits no edema. otoniel pedal pulses 2+.  Lymphadenopathy: No cervical or supraclavicular adenopathy.   Neurological: Alert and oriented to person, place, and time. exhibits normal muscle tone.  Skin: Skin is warm and dry. No diaphoresis.   Psychiatric: Normal mood and affect. Behavior is normal.      Assessment and Plan:     The following treatment plan was discussed:    1. Anxiety  DISCONTINUED: escitalopram (LEXAPRO) 20 MG tablet    not well controlled.  change to lexapro 20 mg " daily.  completed form to use face shield instead of face mask.  do lab f/u 1 mo fore review and sx eval.           Followup: Return in about 4 weeks (around 8/31/2020).

## 2020-08-05 ENCOUNTER — HOSPITAL ENCOUNTER (OUTPATIENT)
Dept: LAB | Facility: MEDICAL CENTER | Age: 45
End: 2020-08-05
Attending: NURSE PRACTITIONER
Payer: COMMERCIAL

## 2020-08-05 DIAGNOSIS — D72.819 LEUKOPENIA, UNSPECIFIED TYPE: ICD-10-CM

## 2020-08-05 DIAGNOSIS — I10 ESSENTIAL HYPERTENSION: ICD-10-CM

## 2020-08-05 DIAGNOSIS — Z12.5 SCREENING FOR MALIGNANT NEOPLASM OF PROSTATE: ICD-10-CM

## 2020-08-05 DIAGNOSIS — E78.6 LOW HDL (UNDER 40): ICD-10-CM

## 2020-08-05 DIAGNOSIS — E29.1 HYPOGONADISM, MALE: ICD-10-CM

## 2020-08-05 DIAGNOSIS — Z13.220 SCREENING FOR HYPERLIPIDEMIA: ICD-10-CM

## 2020-08-05 LAB
ALBUMIN SERPL BCP-MCNC: 4.3 G/DL (ref 3.2–4.9)
ALBUMIN/GLOB SERPL: 1.6 G/DL
ALP SERPL-CCNC: 46 U/L (ref 30–99)
ALT SERPL-CCNC: 38 U/L (ref 2–50)
ANION GAP SERPL CALC-SCNC: 10 MMOL/L (ref 7–16)
AST SERPL-CCNC: 22 U/L (ref 12–45)
BASOPHILS # BLD AUTO: 1.4 % (ref 0–1.8)
BASOPHILS # BLD: 0.06 K/UL (ref 0–0.12)
BILIRUB SERPL-MCNC: 0.9 MG/DL (ref 0.1–1.5)
BUN SERPL-MCNC: 22 MG/DL (ref 8–22)
CALCIUM SERPL-MCNC: 9.4 MG/DL (ref 8.4–10.2)
CHLORIDE SERPL-SCNC: 106 MMOL/L (ref 96–112)
CHOLEST SERPL-MCNC: 129 MG/DL (ref 100–199)
CO2 SERPL-SCNC: 26 MMOL/L (ref 20–33)
CREAT SERPL-MCNC: 1.26 MG/DL (ref 0.5–1.4)
CREAT UR-MCNC: 183.62 MG/DL
EOSINOPHIL # BLD AUTO: 0.15 K/UL (ref 0–0.51)
EOSINOPHIL NFR BLD: 3.4 % (ref 0–6.9)
ERYTHROCYTE [DISTWIDTH] IN BLOOD BY AUTOMATED COUNT: 41.2 FL (ref 35.9–50)
FASTING STATUS PATIENT QL REPORTED: NORMAL
GLOBULIN SER CALC-MCNC: 2.7 G/DL (ref 1.9–3.5)
GLUCOSE SERPL-MCNC: 103 MG/DL (ref 65–99)
HCT VFR BLD AUTO: 43.9 % (ref 42–52)
HDLC SERPL-MCNC: 32 MG/DL
HGB BLD-MCNC: 15.3 G/DL (ref 14–18)
IMM GRANULOCYTES # BLD AUTO: 0.01 K/UL (ref 0–0.11)
IMM GRANULOCYTES NFR BLD AUTO: 0.2 % (ref 0–0.9)
LDLC SERPL CALC-MCNC: 56 MG/DL
LYMPHOCYTES # BLD AUTO: 1.62 K/UL (ref 1–4.8)
LYMPHOCYTES NFR BLD: 36.7 % (ref 22–41)
MCH RBC QN AUTO: 30.8 PG (ref 27–33)
MCHC RBC AUTO-ENTMCNC: 34.9 G/DL (ref 33.7–35.3)
MCV RBC AUTO: 88.5 FL (ref 81.4–97.8)
MICROALBUMIN UR-MCNC: <1.2 MG/DL
MICROALBUMIN/CREAT UR: NORMAL MG/G (ref 0–30)
MONOCYTES # BLD AUTO: 0.36 K/UL (ref 0–0.85)
MONOCYTES NFR BLD AUTO: 8.2 % (ref 0–13.4)
NEUTROPHILS # BLD AUTO: 2.21 K/UL (ref 1.82–7.42)
NEUTROPHILS NFR BLD: 50.1 % (ref 44–72)
NRBC # BLD AUTO: 0 K/UL
NRBC BLD-RTO: 0 /100 WBC
PLATELET # BLD AUTO: 203 K/UL (ref 164–446)
PMV BLD AUTO: 9.3 FL (ref 9–12.9)
POTASSIUM SERPL-SCNC: 4.5 MMOL/L (ref 3.6–5.5)
PROT SERPL-MCNC: 7 G/DL (ref 6–8.2)
RBC # BLD AUTO: 4.96 M/UL (ref 4.7–6.1)
SODIUM SERPL-SCNC: 142 MMOL/L (ref 135–145)
TRIGL SERPL-MCNC: 206 MG/DL (ref 0–149)
WBC # BLD AUTO: 4.4 K/UL (ref 4.8–10.8)

## 2020-08-05 PROCEDURE — 84403 ASSAY OF TOTAL TESTOSTERONE: CPT

## 2020-08-05 PROCEDURE — 82570 ASSAY OF URINE CREATININE: CPT

## 2020-08-05 PROCEDURE — 36415 COLL VENOUS BLD VENIPUNCTURE: CPT

## 2020-08-05 PROCEDURE — 84402 ASSAY OF FREE TESTOSTERONE: CPT

## 2020-08-05 PROCEDURE — 82043 UR ALBUMIN QUANTITATIVE: CPT

## 2020-08-05 PROCEDURE — 80061 LIPID PANEL: CPT

## 2020-08-05 PROCEDURE — 80053 COMPREHEN METABOLIC PANEL: CPT

## 2020-08-05 PROCEDURE — 84270 ASSAY OF SEX HORMONE GLOBUL: CPT

## 2020-08-05 PROCEDURE — 84154 ASSAY OF PSA FREE: CPT

## 2020-08-05 PROCEDURE — 84153 ASSAY OF PSA TOTAL: CPT

## 2020-08-05 PROCEDURE — 85025 COMPLETE CBC W/AUTO DIFF WBC: CPT

## 2020-08-06 LAB
SHBG SERPL-SCNC: 15 NMOL/L (ref 11–80)
TESTOST FREE MFR SERPL: 2.5 % (ref 1.6–2.9)
TESTOST FREE SERPL-MCNC: 75 PG/ML (ref 47–244)
TESTOST SERPL-MCNC: 300 NG/DL (ref 300–890)

## 2020-08-07 LAB
PSA FREE MFR SERPL: 12 %
PSA FREE SERPL-MCNC: 0.1 NG/ML
PSA SERPL-MCNC: 0.8 NG/ML (ref 0–4)

## 2020-08-17 ENCOUNTER — OFFICE VISIT (OUTPATIENT)
Dept: MEDICAL GROUP | Facility: MEDICAL CENTER | Age: 45
End: 2020-08-17
Payer: COMMERCIAL

## 2020-08-17 VITALS
DIASTOLIC BLOOD PRESSURE: 80 MMHG | BODY MASS INDEX: 31.94 KG/M2 | TEMPERATURE: 98 F | HEART RATE: 70 BPM | SYSTOLIC BLOOD PRESSURE: 130 MMHG | HEIGHT: 70 IN | WEIGHT: 223.1 LBS | OXYGEN SATURATION: 96 %

## 2020-08-17 DIAGNOSIS — D72.819 LEUKOPENIA, UNSPECIFIED TYPE: ICD-10-CM

## 2020-08-17 DIAGNOSIS — I10 ESSENTIAL HYPERTENSION: ICD-10-CM

## 2020-08-17 DIAGNOSIS — E78.5 HYPERLIPIDEMIA LDL GOAL <100: ICD-10-CM

## 2020-08-17 DIAGNOSIS — E29.1 HYPOGONADISM, MALE: ICD-10-CM

## 2020-08-17 DIAGNOSIS — D36.12: ICD-10-CM

## 2020-08-17 PROCEDURE — 99214 OFFICE O/P EST MOD 30 MIN: CPT | Performed by: NURSE PRACTITIONER

## 2020-08-17 RX ORDER — TESTOSTERONE 16.2 MG/G
GEL TRANSDERMAL
Qty: 225 G | Refills: 1 | Status: SHIPPED | OUTPATIENT
Start: 2020-08-17 | End: 2020-09-16

## 2020-08-17 ASSESSMENT — FIBROSIS 4 INDEX: FIB4 SCORE: 0.76

## 2020-08-17 NOTE — PROGRESS NOTES
Subjective:     Ramsey Allen is a 45 y.o. male who presents with HTN.    HPI: Seen in f/u for HTN.  He is feeling well.    His bp is stable and controlled on meds.  BP is controlled both here and at home.   He is on testosterone.  He has dec his dose to 1 pump daily since he was about to runout. He did that 1 mo ago. Testosterone on lab is 300.  Stable on meds. Needs refill  He is having left foot.  Will occur after hiking that he does for exercise.  Pain on ball of foot.  Pain started over a year ago.  Getting worse.    Reviewed lab with pt.  GFR, alb/cr ratio, CMP, PSA is wnl  Testosterone is low normal at 300.   CBC is wnl except overall wbc is 4.4.  Was 4.1 last time.  Dif is wnl.  No recurrent infecitons, fevers.    LP shows good LDL. Not on statin.  HDL is down from 41 to 32.  trg are up from 100 to 206.  He rents a room from a friend and doest have a lot of availability to cook his good meals.  trevon try and improve.     Patient Active Problem List    Diagnosis Date Noted   • Obesity (BMI 30-39.9) 12/14/2016   • Left-sided tinnitus 11/11/2016   • Vitamin d deficiency 03/20/2012   • Preventative health care 09/21/2011   • Recurrent sinusitis    • Post traumatic stress disorder (PTSD)    • Low back pain 07/08/2010   • HTN (hypertension) 04/28/2010   • Anxiety 10/21/2009   • Erectile dysfunction 08/04/2009       Current medicines (including changes today)  Current Outpatient Medications   Medication Sig Dispense Refill   • Testosterone (ANDROGEL) 20.25 MG/ACT (1.62%) Gel 2 PUMPS TOPICAL AS DIRECTED 225 g 1   • busPIRone (BUSPAR) 15 MG tablet TAKE 1 TABLET BY MOUTH TWICE A DAY 30 Tab 0   • montelukast (SINGULAIR) 10 MG Tab TAKE 1 TABLET BY MOUTH EVERY DAY 90 Tab 2   • lisinopril (PRINIVIL, ZESTRIL) 30 MG tablet TAKE 1 TABLET BY MOUTH EVERY DAY 90 Tab 2   • escitalopram (LEXAPRO) 20 MG tablet Take 1.5 Tabs by mouth every day. 135 Tab 2   • tadalafil (CIALIS) 10 MG tablet Take 1 Tab by mouth as needed  "for Erectile Dysfunction. 6 Tab 6   • Cholecalciferol (VITAMIN D) 2000 units Cap Take 1 Cap by mouth every day. 30 Cap 0   • Loratadine (CLARITIN) 10 MG CAPS Take  by mouth.       No current facility-administered medications for this visit.        Allergies   Allergen Reactions   • Erythromycin    • Ilosone [Erythromycin Estolate]        ROS  Constitutional: Negative. Negative for fever, chills, weight loss, malaise/fatigue and diaphoresis.   HENT: Negative. Negative for hearing loss, ear pain, nosebleeds, congestion, sore throat, neck pain, tinnitus and ear discharge.   Respiratory: Negative. Negative for cough, hemoptysis, sputum production, shortness of breath, wheezing and stridor.   Cardiovascular: Negative. Negative for chest pain, palpitations, orthopnea, claudication, leg swelling and PND.   Gastrointestinal: Denies nausea, vomiting, diarrhea, constipation, heartburn, melena or hematochezia.  Genitourinary: Denies dysuria, hematuria, urinary incontinence, frequency or urgency.        Objective:     /80   Pulse 70   Temp 36.7 °C (98 °F) (Temporal)   Ht 1.778 m (5' 10\")   Wt 101.2 kg (223 lb 1.6 oz)   SpO2 96%  Body mass index is 32.01 kg/m².    Physical Exam:  Vitals reviewed.  Constitutional: Oriented to person, place, and time. appears well-developed and well-nourished. No distress.   Cardiovascular: Normal rate, regular rhythm, normal heart sounds and intact distal pulses. Exam reveals no gallop and no friction rub. No murmur heard. No carotid bruits.   Pulmonary/Chest: Effort normal and breath sounds normal. No stridor. No respiratory distress. no wheezes or rales. exhibits no tenderness.   Musculoskeletal: Normal range of motion. exhibits no edema. otoniel pedal pulses 2+.  Lymphadenopathy: No cervical or supraclavicular adenopathy.   Neurological: Alert and oriented to person, place, and time. exhibits normal muscle tone.  Skin: Skin is warm and dry. No diaphoresis.   Psychiatric: Normal mood and " affect. Behavior is normal.      Assessment and Plan:     The following treatment plan was discussed:    1. Hypogonadism, male  Testosterone (ANDROGEL) 20.25 MG/ACT (1.62%) Gel    TESTOSTERONE F&T MALE ADULT    testosterone is low nomal d/t dec dose so he would not run out.   refill done.  inc back to 2 sp yun.  recheck lab in 3 mo.  f/u for review   2. Hyperlipidemia LDL goal <100  Lipid Profile    trg and HDL not at goal.  enc pt to improve healthy diet and inc exercise.    3. Neuroma of left upper extremity  REFERRAL TO PODIATRY    sx are limiting his exercise.  refer podiatry   4. Essential hypertension      stable and well controlled on meds.    5. Leukopenia, unspecified type      nonspecific finding that is chronic.  dif is wnl.  no infections.  continue to monitor.          Followup: Return in about 3 months (around 11/17/2020).

## 2020-10-19 DIAGNOSIS — E29.1 HYPOGONADISM, MALE: ICD-10-CM

## 2020-10-19 DIAGNOSIS — I10 ESSENTIAL HYPERTENSION: ICD-10-CM

## 2020-10-19 DIAGNOSIS — J30.1 CHRONIC ALLERGIC RHINITIS DUE TO POLLEN: ICD-10-CM

## 2020-10-19 DIAGNOSIS — F41.9 ANXIETY: ICD-10-CM

## 2020-10-19 RX ORDER — BUSPIRONE HYDROCHLORIDE 15 MG/1
TABLET ORAL
Qty: 30 TAB | Refills: 0 | Status: SHIPPED | OUTPATIENT
Start: 2020-10-19 | End: 2021-02-08

## 2020-10-19 RX ORDER — LISINOPRIL 30 MG/1
30 TABLET ORAL
Qty: 90 TAB | Refills: 2 | Status: SHIPPED | OUTPATIENT
Start: 2020-10-19 | End: 2021-02-08

## 2020-10-19 RX ORDER — TADALAFIL 10 MG/1
10 TABLET ORAL PRN
Qty: 6 TAB | Refills: 6 | Status: SHIPPED | OUTPATIENT
Start: 2020-10-19 | End: 2021-02-08

## 2020-10-19 RX ORDER — ESCITALOPRAM OXALATE 20 MG/1
30 TABLET ORAL DAILY
Qty: 135 TAB | Refills: 2 | Status: SHIPPED | OUTPATIENT
Start: 2020-10-19 | End: 2021-02-08

## 2020-10-19 RX ORDER — MONTELUKAST SODIUM 10 MG/1
10 TABLET ORAL
Qty: 90 TAB | Refills: 2 | Status: SHIPPED | OUTPATIENT
Start: 2020-10-19 | End: 2021-02-08

## 2021-02-02 ENCOUNTER — TELEPHONE (OUTPATIENT)
Dept: MEDICAL GROUP | Facility: MEDICAL CENTER | Age: 46
End: 2021-02-02

## 2021-02-02 NOTE — TELEPHONE ENCOUNTER
ESTABLISHED PATIENT PRE-VISIT PLANNING     Patient was NOT contacted to complete PVP.     Note: Patient will not be contacted if there is no indication to call.     1.  Reviewed notes from the last few office visits within the medical group: Yes    2.  If any orders were placed at last visit or intended to be done for this visit (i.e. 6 mos follow-up), do we have Results/Consult Notes?         •  Labs - Labs ordered, NOT completed. Patient advised to complete prior to next appointment.  Note: If patient appointment is for lab review and patient did not complete labs, check with provider if OK to reschedule patient until labs completed.       •  Imaging - Imaging was not ordered at last office visit.       •  Referrals - Referral ordered, patient has NOT been seen.    3. Is this appointment scheduled as a Hospital Follow-Up? No    4.  Immunizations were updated in Epic using Reconcile Outside Information activity? Yes    5.  Patient is due for the following Health Maintenance Topics:   Health Maintenance Due   Topic Date Due   • IMM DTaP/Tdap/Td Vaccine (1 - Tdap) 04/25/1996   • IMM INFLUENZA (1) 09/01/2020       6.  AHA (Pulse8) form printed for Provider? N/A

## 2021-02-08 ENCOUNTER — OFFICE VISIT (OUTPATIENT)
Dept: MEDICAL GROUP | Facility: MEDICAL CENTER | Age: 46
End: 2021-02-08
Payer: COMMERCIAL

## 2021-02-08 VITALS
OXYGEN SATURATION: 100 % | HEART RATE: 87 BPM | HEIGHT: 70 IN | BODY MASS INDEX: 29.78 KG/M2 | DIASTOLIC BLOOD PRESSURE: 104 MMHG | WEIGHT: 208 LBS | SYSTOLIC BLOOD PRESSURE: 150 MMHG | TEMPERATURE: 98 F

## 2021-02-08 DIAGNOSIS — Z13.21 ENCOUNTER FOR VITAMIN DEFICIENCY SCREENING: ICD-10-CM

## 2021-02-08 DIAGNOSIS — E78.5 HYPERLIPIDEMIA LDL GOAL <100: ICD-10-CM

## 2021-02-08 DIAGNOSIS — E29.1 HYPOGONADISM, MALE: ICD-10-CM

## 2021-02-08 DIAGNOSIS — F32.A DEPRESSION, UNSPECIFIED DEPRESSION TYPE: ICD-10-CM

## 2021-02-08 DIAGNOSIS — F40.00 AGORAPHOBIA: ICD-10-CM

## 2021-02-08 DIAGNOSIS — N52.9 ERECTILE DYSFUNCTION, UNSPECIFIED ERECTILE DYSFUNCTION TYPE: ICD-10-CM

## 2021-02-08 DIAGNOSIS — I10 ESSENTIAL HYPERTENSION: ICD-10-CM

## 2021-02-08 PROCEDURE — 99214 OFFICE O/P EST MOD 30 MIN: CPT | Performed by: NURSE PRACTITIONER

## 2021-02-08 RX ORDER — TADALAFIL 20 MG/1
20 TABLET ORAL PRN
Qty: 10 TAB | Refills: 3 | Status: SHIPPED | OUTPATIENT
Start: 2021-02-08 | End: 2022-05-31 | Stop reason: SDUPTHER

## 2021-02-08 RX ORDER — DULOXETIN HYDROCHLORIDE 30 MG/1
30 CAPSULE, DELAYED RELEASE ORAL DAILY
Qty: 30 CAP | Refills: 1 | Status: SHIPPED | OUTPATIENT
Start: 2021-02-08 | End: 2021-04-20

## 2021-02-08 ASSESSMENT — PATIENT HEALTH QUESTIONNAIRE - PHQ9
5. POOR APPETITE OR OVEREATING: 1 - SEVERAL DAYS
SUM OF ALL RESPONSES TO PHQ QUESTIONS 1-9: 13
CLINICAL INTERPRETATION OF PHQ2 SCORE: 3

## 2021-02-08 ASSESSMENT — FIBROSIS 4 INDEX: FIB4 SCORE: 0.76

## 2021-02-08 NOTE — PROGRESS NOTES
Subjective:     Ramsey Allen is a 45 y.o. male who presents with depression  HPI: The patient states that for about 1 year he has been experiencing an increase in life stressors (divorce, pandemic, relationship issues, job strain) and he is feeling correlating situational depression and anxiety. Pt. Has long standing history of depression/anxiety but stopped taking all previous meds. He reported that lexapro and buspar caused him nausea and vomiting.  Patient reports that he stopped taking most of his other medications over the past few months as well, and is only taking claritin and vitamin D. He denies suicidal ideation and has no thoughts of self harm.  He reports that he also experiences occassional agoraphobia and has a hard time leaving the house at times.  Pt is requesting to be put back on an antidepressant.  He is declining CBT at this time.    Pt has a history of essential hypertension and is not taking any medications.  Today BP was elevated at 150/104.  He states he is moderately anxious today and feels it is related.  He also reports to have not been following a healthy diet and binge eating sweets and salted foods.  He reports occasional bouts of exercise in the form of hiking and getting outdoors. His most recent LP reveals increased triglycerides and decreased HDL.    Recent labs done 08/20 reveal CBC w/diff-wnl, cmp-wnl except slightly elevated fasting glucose at 103, LP with elevated triglycerides and decreased HDL, Testosterone on lower end of normal, will repeat.  Not on testosterone at this time, stopped about 6 mo. Ago.  Pt requesting refill on Cialis for [facility].      Patient Active Problem List    Diagnosis Date Noted   • Obesity (BMI 30-39.9) 12/14/2016   • Left-sided tinnitus 11/11/2016   • Vitamin d deficiency 03/20/2012   • Preventative health care 09/21/2011   • Recurrent sinusitis    • Post traumatic stress disorder (PTSD)    • Low back pain 07/08/2010   • HTN  "(hypertension) 04/28/2010   • Anxiety 10/21/2009   • Erectile dysfunction 08/04/2009       Current medicines (including changes today)  Current Outpatient Medications   Medication Sig Dispense Refill   • tadalafil (CIALIS) 20 MG tablet Take 1 Tab by mouth as needed for Erectile Dysfunction. 10 Tab 3   • DULoxetine (CYMBALTA) 30 MG Cap DR Particles Take 1 Cap by mouth every day. 30 Cap 1   • Cholecalciferol (VITAMIN D) 2000 units Cap Take 1 Cap by mouth every day. 30 Cap 0   • Loratadine (CLARITIN) 10 MG CAPS Take  by mouth.       No current facility-administered medications for this visit.        Allergies   Allergen Reactions   • Erythromycin    • Ilosone [Erythromycin Estolate]        ROS  Constitutional: Negative. Negative for fever, chills, weight loss, malaise/fatigue and diaphoresis.   Respiratory: Negative. Negative for cough, hemoptysis, sputum production, shortness of breath, wheezing and stridor.   Cardiovascular: Negative. Negative for chest pain, palpitations, orthopnea, claudication, leg swelling and PND.   Gastrointestinal: Denies nausea, vomiting, diarrhea, constipation, heartburn, melena or hematochezia.  Genitourinary: Denies dysuria, hematuria, urinary incontinence, frequency or urgency.        Objective:     /104 (BP Location: Right arm, Patient Position: Sitting)   Pulse 87   Temp 36.7 °C (98 °F) (Temporal)   Ht 1.778 m (5' 10\")   Wt 94.3 kg (208 lb)   SpO2 100%  Body mass index is 29.84 kg/m².    Physical Exam:  Vitals reviewed.  Constitutional: Oriented to person, place, and time. appears well-developed and well-nourished. No distress.   Cardiovascular: Normal rate, regular rhythm, normal heart sounds and intact distal pulses. Exam reveals no gallop and no friction rub. No murmur heard. No carotid bruits.   Pulmonary/Chest: Effort normal and breath sounds normal. No stridor. No respiratory distress. no wheezes or rales. exhibits no tenderness.   Musculoskeletal: Normal range of " motion. exhibits no edema.  Lymphadenopathy: No cervical or supraclavicular adenopathy.   Neurological: Alert and oriented to person, place, and time. exhibits normal muscle tone.  Skin: Skin is warm and dry. No diaphoresis.   Psychiatric: Normal mood and affect. Behavior is normal.      Assessment and Plan:     The following treatment plan was discussed:    1. Depression, unspecified depression type  DULoxetine (CYMBALTA) 30 MG Cap DR Particles    Pt give script for cymbalta.  Stopped previous RX-lexapro due to nausea   2. Agoraphobia      Pt states he has trouble leaving home at times and feels anxious, declines CBT referal at this time   3. Essential hypertension  Comp Metabolic Panel    MICROALBUMIN CREAT RATIO URINE    Pt quit taking meds apx 6 mo. ago.  Will flash BP in 1 month   4. Hypogonadism, male  Testosterone, Free & Total, Adult Male (w/SHBG)    CBC WITH DIFFERENTIAL    PSA TOTAL + %FREE    Will hold off on refilling testosterone at this time and flash labs. Cialis RX given     5. Erectile dysfunction, unspecified erectile dysfunction type  tadalafil (CIALIS) 20 MG tablet    Testosterone, Free & Total, Adult Male (w/SHBG)    CBC WITH DIFFERENTIAL    PSA TOTAL + %FREE    Cilalis script given, will flash testosterone levels in 1 month   6. Hyperlipidemia LDL goal <100  Lipid Profile    Comp Metabolic Panel    Will flash LP in 1 mo. f/u for review   7. Encounter for vitamin deficiency screening  VITAMIN D,25 HYDROXY

## 2021-03-02 ENCOUNTER — TELEPHONE (OUTPATIENT)
Dept: MEDICAL GROUP | Facility: MEDICAL CENTER | Age: 46
End: 2021-03-02

## 2021-03-02 NOTE — TELEPHONE ENCOUNTER
ESTABLISHED PATIENT PRE-VISIT PLANNING     Patient was NOT contacted to complete PVP.     Note: Patient will not be contacted if there is no indication to call.     1.  Reviewed notes from the last few office visits within the medical group: Yes    2.  If any orders were placed at last visit or intended to be done for this visit (i.e. 6 mos follow-up), do we have Results/Consult Notes?         •  Labs - Labs ordered, NOT completed.  Note: If patient appointment is for lab review and patient did not complete labs, check with provider if OK to reschedule patient until labs completed.       •  Imaging - Imaging was not ordered at last office visit.       •  Referrals - Referral ordered, patient has NOT been seen.    3. Is this appointment scheduled as a Hospital Follow-Up? No    4.  Immunizations were updated in Epic using Reconcile Outside Information activity? Yes    5.  Patient is due for the following Health Maintenance Topics:   Health Maintenance Due   Topic Date Due   • IMM DTaP/Tdap/Td Vaccine (1 - Tdap) Never done   • IMM INFLUENZA (1) Never done     6.  AHA (Pulse8) form printed for Provider? N/A         Outside information NOT reconciled using the LocalBanya feature. Per Niyah Mims, the LocalBanya feature is down as of 02/09/2021 at 2:00pm. Will reconcile outside information at a later date.

## 2021-03-05 ENCOUNTER — HOSPITAL ENCOUNTER (OUTPATIENT)
Dept: LAB | Facility: MEDICAL CENTER | Age: 46
End: 2021-03-05
Attending: NURSE PRACTITIONER
Payer: COMMERCIAL

## 2021-03-05 DIAGNOSIS — N52.9 ERECTILE DYSFUNCTION, UNSPECIFIED ERECTILE DYSFUNCTION TYPE: ICD-10-CM

## 2021-03-05 DIAGNOSIS — E78.5 HYPERLIPIDEMIA LDL GOAL <100: ICD-10-CM

## 2021-03-05 DIAGNOSIS — E29.1 HYPOGONADISM, MALE: ICD-10-CM

## 2021-03-05 DIAGNOSIS — Z13.21 ENCOUNTER FOR VITAMIN DEFICIENCY SCREENING: ICD-10-CM

## 2021-03-05 DIAGNOSIS — I10 ESSENTIAL HYPERTENSION: ICD-10-CM

## 2021-03-05 LAB
25(OH)D3 SERPL-MCNC: 56 NG/ML (ref 30–100)
ALBUMIN SERPL BCP-MCNC: 4.2 G/DL (ref 3.2–4.9)
ALBUMIN/GLOB SERPL: 1.6 G/DL
ALP SERPL-CCNC: 42 U/L (ref 30–99)
ALT SERPL-CCNC: 43 U/L (ref 2–50)
ANION GAP SERPL CALC-SCNC: 11 MMOL/L (ref 7–16)
AST SERPL-CCNC: 30 U/L (ref 12–45)
BASOPHILS # BLD AUTO: 1.6 % (ref 0–1.8)
BASOPHILS # BLD: 0.07 K/UL (ref 0–0.12)
BILIRUB SERPL-MCNC: 0.8 MG/DL (ref 0.1–1.5)
BUN SERPL-MCNC: 20 MG/DL (ref 8–22)
CALCIUM SERPL-MCNC: 9.3 MG/DL (ref 8.4–10.2)
CHLORIDE SERPL-SCNC: 107 MMOL/L (ref 96–112)
CHOLEST SERPL-MCNC: 147 MG/DL (ref 100–199)
CO2 SERPL-SCNC: 23 MMOL/L (ref 20–33)
CREAT SERPL-MCNC: 1.07 MG/DL (ref 0.5–1.4)
CREAT UR-MCNC: 113.37 MG/DL
EOSINOPHIL # BLD AUTO: 0.13 K/UL (ref 0–0.51)
EOSINOPHIL NFR BLD: 2.9 % (ref 0–6.9)
ERYTHROCYTE [DISTWIDTH] IN BLOOD BY AUTOMATED COUNT: 41.3 FL (ref 35.9–50)
FASTING STATUS PATIENT QL REPORTED: NORMAL
GLOBULIN SER CALC-MCNC: 2.7 G/DL (ref 1.9–3.5)
GLUCOSE SERPL-MCNC: 96 MG/DL (ref 65–99)
HCT VFR BLD AUTO: 44.5 % (ref 42–52)
HDLC SERPL-MCNC: 40 MG/DL
HGB BLD-MCNC: 15.5 G/DL (ref 14–18)
IMM GRANULOCYTES # BLD AUTO: 0.02 K/UL (ref 0–0.11)
IMM GRANULOCYTES NFR BLD AUTO: 0.4 % (ref 0–0.9)
LDLC SERPL CALC-MCNC: 90 MG/DL
LYMPHOCYTES # BLD AUTO: 1.49 K/UL (ref 1–4.8)
LYMPHOCYTES NFR BLD: 33.5 % (ref 22–41)
MCH RBC QN AUTO: 31 PG (ref 27–33)
MCHC RBC AUTO-ENTMCNC: 34.8 G/DL (ref 33.7–35.3)
MCV RBC AUTO: 89 FL (ref 81.4–97.8)
MICROALBUMIN UR-MCNC: <1.2 MG/DL
MICROALBUMIN/CREAT UR: NORMAL MG/G (ref 0–30)
MONOCYTES # BLD AUTO: 0.38 K/UL (ref 0–0.85)
MONOCYTES NFR BLD AUTO: 8.5 % (ref 0–13.4)
NEUTROPHILS # BLD AUTO: 2.36 K/UL (ref 1.82–7.42)
NEUTROPHILS NFR BLD: 53.1 % (ref 44–72)
NRBC # BLD AUTO: 0 K/UL
NRBC BLD-RTO: 0 /100 WBC
PLATELET # BLD AUTO: 205 K/UL (ref 164–446)
PMV BLD AUTO: 9 FL (ref 9–12.9)
POTASSIUM SERPL-SCNC: 4.4 MMOL/L (ref 3.6–5.5)
PROT SERPL-MCNC: 6.9 G/DL (ref 6–8.2)
RBC # BLD AUTO: 5 M/UL (ref 4.7–6.1)
SODIUM SERPL-SCNC: 141 MMOL/L (ref 135–145)
TRIGL SERPL-MCNC: 86 MG/DL (ref 0–149)
WBC # BLD AUTO: 4.5 K/UL (ref 4.8–10.8)

## 2021-03-05 PROCEDURE — 85025 COMPLETE CBC W/AUTO DIFF WBC: CPT

## 2021-03-05 PROCEDURE — 80061 LIPID PANEL: CPT

## 2021-03-05 PROCEDURE — 82570 ASSAY OF URINE CREATININE: CPT

## 2021-03-05 PROCEDURE — 84154 ASSAY OF PSA FREE: CPT

## 2021-03-05 PROCEDURE — 82043 UR ALBUMIN QUANTITATIVE: CPT

## 2021-03-05 PROCEDURE — 84153 ASSAY OF PSA TOTAL: CPT

## 2021-03-05 PROCEDURE — 84403 ASSAY OF TOTAL TESTOSTERONE: CPT

## 2021-03-05 PROCEDURE — 82306 VITAMIN D 25 HYDROXY: CPT

## 2021-03-05 PROCEDURE — 36415 COLL VENOUS BLD VENIPUNCTURE: CPT

## 2021-03-05 PROCEDURE — 84402 ASSAY OF FREE TESTOSTERONE: CPT

## 2021-03-05 PROCEDURE — 84270 ASSAY OF SEX HORMONE GLOBUL: CPT

## 2021-03-05 PROCEDURE — 80053 COMPREHEN METABOLIC PANEL: CPT

## 2021-03-07 LAB
PSA FREE MFR SERPL: 29 %
PSA FREE SERPL-MCNC: 0.2 NG/ML
PSA SERPL-MCNC: 0.7 NG/ML (ref 0–4)
SHBG SERPL-SCNC: 20 NMOL/L (ref 11–80)
TESTOST FREE MFR SERPL: 2.3 % (ref 1.6–2.9)
TESTOST FREE SERPL-MCNC: 84 PG/ML (ref 47–244)
TESTOST SERPL-MCNC: 363 NG/DL (ref 300–890)

## 2021-03-09 ENCOUNTER — TELEPHONE (OUTPATIENT)
Dept: MEDICAL GROUP | Facility: MEDICAL CENTER | Age: 46
End: 2021-03-09

## 2021-03-09 NOTE — LETTER
March 9, 2021        Ramsey Allen  [address] [place] [address]        Dear Ramsey:    After careful review of your chart, we have noted you are due for a follow up appointment.  We request you call our office at [phone] at your earliest convenience and make an appointment.     We look forward to scheduling an appointment for you, so that we may provide you with the safest and most complete medical care.        If you have any questions or concerns, please don't hesitate to call.        Sincerely,        CONNOR Gusman.    Electronically Signed

## 2021-03-09 NOTE — TELEPHONE ENCOUNTER
----- Message from DAVID Gusman sent at 3/9/2021 12:21 AM PST -----  Please have pt reset appointment to review and discuss treatment for labs since i am not coming in tomorrow.

## 2021-03-15 ENCOUNTER — OFFICE VISIT (OUTPATIENT)
Dept: MEDICAL GROUP | Facility: MEDICAL CENTER | Age: 46
End: 2021-03-15
Payer: COMMERCIAL

## 2021-03-15 VITALS
DIASTOLIC BLOOD PRESSURE: 110 MMHG | OXYGEN SATURATION: 98 % | HEIGHT: 70 IN | WEIGHT: 210 LBS | BODY MASS INDEX: 30.06 KG/M2 | HEART RATE: 91 BPM | SYSTOLIC BLOOD PRESSURE: 134 MMHG | TEMPERATURE: 98 F

## 2021-03-15 DIAGNOSIS — E55.9 VITAMIN D DEFICIENCY: ICD-10-CM

## 2021-03-15 DIAGNOSIS — F41.1 GENERALIZED ANXIETY DISORDER: ICD-10-CM

## 2021-03-15 DIAGNOSIS — F32.A DEPRESSION, UNSPECIFIED DEPRESSION TYPE: ICD-10-CM

## 2021-03-15 DIAGNOSIS — E78.5 HYPERLIPIDEMIA LDL GOAL <100: ICD-10-CM

## 2021-03-15 DIAGNOSIS — I10 ESSENTIAL HYPERTENSION: ICD-10-CM

## 2021-03-15 PROCEDURE — 99214 OFFICE O/P EST MOD 30 MIN: CPT | Performed by: NURSE PRACTITIONER

## 2021-03-15 RX ORDER — LISINOPRIL 10 MG/1
10 TABLET ORAL DAILY
Qty: 30 TABLET | Refills: 1 | Status: SHIPPED | OUTPATIENT
Start: 2021-03-15 | End: 2021-05-15

## 2021-03-15 RX ORDER — VENLAFAXINE HYDROCHLORIDE 75 MG/1
75 CAPSULE, EXTENDED RELEASE ORAL DAILY
Qty: 30 CAPSULE | Refills: 3 | Status: SHIPPED | OUTPATIENT
Start: 2021-03-15 | End: 2021-04-20

## 2021-03-15 ASSESSMENT — FIBROSIS 4 INDEX: FIB4 SCORE: 0.96

## 2021-03-15 NOTE — PROGRESS NOTES
Subjective:     Ramsey Allen is a 45 y.o. male who presents with HTN.    HPI:   Seen in f/u for HTN.  He was started on cymbalta for depression and anxiety.  He is feeling slightly better but definitely having more fatigue.  Taking naps at lunch.  About 3 hrs after he takes the cymbalta he will get nausea.  He is concerned that he is overthinking his issues.  He reads a lot of self help books.  He is sitting at home and eating.  He has lots of plans but can't do it until COVID over.   His bp is elevated today.  He has been off bp for awhile.  Was on lisinopril and cardura.  He has gained wt and not eating healthy.  Reviewed lab with pt.  GFR, testosterone, CMP, D, PSA, CBC, alb/cr ratio is wnl except overall wbc sl dec.   Improved from previous but dif is wnl.  No recurrent infections.  No fever, chills or sweating.    He is stable on otc d3 supplement.  D has been as low at 8 in the past.    LP shows trg much improved. Down from 206 to 86.  He reports that he has not been eating a healthy diet or exercising regularly.  HDL is up from 32 to 40.  Chronically low.  LDL is at goal but up from 56 to 90.  Goal is <100.        Patient Active Problem List    Diagnosis Date Noted   • Obesity (BMI 30-39.9) 12/14/2016   • Left-sided tinnitus 11/11/2016   • Vitamin d deficiency 03/20/2012   • Preventative health care 09/21/2011   • Recurrent sinusitis    • Post traumatic stress disorder (PTSD)    • Low back pain 07/08/2010   • HTN (hypertension) 04/28/2010   • Anxiety 10/21/2009   • Erectile dysfunction 08/04/2009       Current medicines (including changes today)  Current Outpatient Medications   Medication Sig Dispense Refill   • venlafaxine XR (EFFEXOR XR) 75 MG CAPSULE SR 24 HR Take 1 capsule by mouth every day. 30 capsule 3   • lisinopril (PRINIVIL) 10 MG Tab Take 1 tablet by mouth every day. 30 tablet 1   • tadalafil (CIALIS) 20 MG tablet Take 1 Tab by mouth as needed for Erectile Dysfunction. 10 Tab 3   •  "DULoxetine (CYMBALTA) 30 MG Cap DR Particles Take 1 Cap by mouth every day. 30 Cap 1   • Cholecalciferol (VITAMIN D) 2000 units Cap Take 1 Cap by mouth every day. 30 Cap 0   • Loratadine (CLARITIN) 10 MG CAPS Take  by mouth.       No current facility-administered medications for this visit.       Allergies   Allergen Reactions   • Erythromycin    • Ilosone [Erythromycin Estolate]        ROS  Constitutional: Negative. Negative for fever, chills, weight loss, malaise/fatigue and diaphoresis.   HENT: Negative. Negative for hearing loss, ear pain, nosebleeds, congestion, sore throat, neck pain, tinnitus and ear discharge.   Respiratory: Negative. Negative for cough, hemoptysis, sputum production, shortness of breath, wheezing and stridor.   Cardiovascular: Negative. Negative for chest pain, palpitations, orthopnea, claudication, leg swelling and PND.   Gastrointestinal: Denies nausea, vomiting, diarrhea, constipation, heartburn, melena or hematochezia.  Genitourinary: Denies dysuria, hematuria, urinary incontinence, frequency or urgency.        Objective:     /110 (BP Location: Right arm, Patient Position: Sitting)   Pulse 91   Temp 36.7 °C (98 °F) (Temporal)   Ht 1.778 m (5' 10\")   Wt 95.3 kg (210 lb)   SpO2 98%  Body mass index is 30.13 kg/m².    Physical Exam:  Vitals reviewed.  Constitutional: Oriented to person, place, and time. appears well-developed and well-nourished. No distress.   Cardiovascular: Normal rate, regular rhythm, normal heart sounds and intact distal pulses. Exam reveals no gallop and no friction rub. No murmur heard. No carotid bruits.   Pulmonary/Chest: Effort normal and breath sounds normal. No stridor. No respiratory distress. no wheezes or rales. exhibits no tenderness.   Musculoskeletal: Normal range of motion. exhibits no edema. otoniel pedal pulses 2+.  Lymphadenopathy: No cervical or supraclavicular adenopathy.   Neurological: Alert and oriented to person, place, and time. exhibits " normal muscle tone.  Skin: Skin is warm and dry. No diaphoresis.   Psychiatric: Normal mood and affect. Behavior is normal.      Assessment and Plan:     The following treatment plan was discussed:    1. Depression, unspecified depression type  venlafaxine XR (EFFEXOR XR) 75 MG CAPSULE SR 24 HR    not controlled on cymbalta.  failed zoloft and lexapro.  try effexor XR 75 mg andersen.  f/u 1 mo for sx eval   2. Generalized anxiety disorder  venlafaxine XR (EFFEXOR XR) 75 MG CAPSULE SR 24 HR   3. Essential hypertension  lisinopril (PRINIVIL) 10 MG Tab    bp elevated.  restart lisinopril at 10 mg daily.  low na diet. increase exercise.  f/u 1mo for bp check & sx eval   4. Vitamin D deficiency      stable and controlled on otc supplement.    5. Hyperlipidemia LDL goal <100      LP improved with trg.  LDL at goal but up from previous. HDL improved but not at goal.  enc pt to improve healthy diet and inc activity.         Followup: Return in about 4 weeks (around 4/12/2021).

## 2021-04-13 ENCOUNTER — TELEPHONE (OUTPATIENT)
Dept: MEDICAL GROUP | Facility: MEDICAL CENTER | Age: 46
End: 2021-04-13

## 2021-04-13 NOTE — TELEPHONE ENCOUNTER
ESTABLISHED PATIENT PRE-VISIT PLANNING     Patient was NOT contacted to complete PVP.  -----------------------------------------------------------------------------------------------    1.  Reviewed notes from the last few office visits within the medical group: Yes    2.  If any orders were placed at last visit or intended to be done for this visit (i.e. 6 mos follow-up), do we have Results/Consult Notes?   · Labs - Labs were not ordered at last office visit.  · Imaging - Imaging was not ordered at last office visit.  · Referrals - Referral ordered, patient has NOT been seen. Referral to Podiatry has been AUTHORIZED.    3. Is this appointment scheduled as a Hospital Follow-Up? No    4.  Immunizations were updated in Epic using Reconcile Outside Information activity? Yes. The request for WEB-IZ Failed, unable to update and reconcile new immunizations.    5.  Patient is due for the following Health Maintenance Topics:   Health Maintenance Due   Topic Date Due   • COVID-19 Vaccine (1) Never done   • IMM DTaP/Tdap/Td Vaccine (1 - Tdap) Never done         -----------------------------------------------------------------------------------------------  This pre-Visit Planning note has been created for the Provider and Medical Assistant to review prior to the patient's office appointment. Patient is *NOT REQUIRED* to follow-up on this note.   Outside information NOT reconciled using the [employer] feature. Per Niyah May, the [employer] feature is down as of 02/09/2021 at 2:00pm. Will reconcile outside information at a later date.

## 2021-04-20 ENCOUNTER — OFFICE VISIT (OUTPATIENT)
Dept: MEDICAL GROUP | Facility: MEDICAL CENTER | Age: 46
End: 2021-04-20
Payer: COMMERCIAL

## 2021-04-20 VITALS
HEART RATE: 75 BPM | HEIGHT: 70 IN | TEMPERATURE: 98.1 F | OXYGEN SATURATION: 99 % | WEIGHT: 215.4 LBS | SYSTOLIC BLOOD PRESSURE: 124 MMHG | BODY MASS INDEX: 30.84 KG/M2 | DIASTOLIC BLOOD PRESSURE: 84 MMHG

## 2021-04-20 DIAGNOSIS — I10 HYPERTENSION, ESSENTIAL: ICD-10-CM

## 2021-04-20 DIAGNOSIS — F41.1 GENERALIZED ANXIETY DISORDER: ICD-10-CM

## 2021-04-20 PROCEDURE — 99214 OFFICE O/P EST MOD 30 MIN: CPT | Performed by: NURSE PRACTITIONER

## 2021-04-20 ASSESSMENT — FIBROSIS 4 INDEX: FIB4 SCORE: 0.96

## 2021-04-20 NOTE — PROGRESS NOTES
Subjective:     Ramsey Allen is a 45 y.o. male who presents with anxiety.    HPI:   Seen in f/u for anxiety.  He has been having side effects to effexor.  This med made him worse with aggressive anxiety.  He has been nauseated and dizziness every day except for the 2 days he forgot to take it.  He is seeing a therapist.  She recommends psychiatry.  She doesn't know what his underlying dx is.  He just had his 2nd COVID shot today.  Last vaccine made him very fatigued.  Sl fatigued now.   He was placed on lisinopril at last appt.  No s/e to med.  Bp is now controlled.  Normal here today.  He checks at home but it is labile.      Patient Active Problem List    Diagnosis Date Noted   • Obesity (BMI 30-39.9) 12/14/2016   • Left-sided tinnitus 11/11/2016   • Vitamin d deficiency 03/20/2012   • Preventative health care 09/21/2011   • Recurrent sinusitis    • Post traumatic stress disorder (PTSD)    • Low back pain 07/08/2010   • HTN (hypertension) 04/28/2010   • Anxiety 10/21/2009   • Erectile dysfunction 08/04/2009       Current medicines (including changes today)  Current Outpatient Medications   Medication Sig Dispense Refill   • lisinopril (PRINIVIL) 10 MG Tab Take 1 tablet by mouth every day. 30 tablet 1   • tadalafil (CIALIS) 20 MG tablet Take 1 Tab by mouth as needed for Erectile Dysfunction. 10 Tab 3   • Cholecalciferol (VITAMIN D) 2000 units Cap Take 1 Cap by mouth every day. 30 Cap 0   • Loratadine (CLARITIN) 10 MG CAPS Take  by mouth.       No current facility-administered medications for this visit.       Allergies   Allergen Reactions   • Erythromycin    • Ilosone [Erythromycin Estolate]        ROS  Constitutional: Negative. Negative for fever, chills, weight loss, malaise/fatigue and diaphoresis.   HENT: Negative. Negative for hearing loss, ear pain, nosebleeds, congestion, sore throat, neck pain, tinnitus and ear discharge.   Respiratory: Negative. Negative for cough, hemoptysis, sputum  "production, shortness of breath, wheezing and stridor.   Cardiovascular: Negative. Negative for chest pain, palpitations, orthopnea, claudication, leg swelling and PND.   Gastrointestinal: Denies nausea, vomiting, diarrhea, constipation, heartburn, melena or hematochezia.  Genitourinary: Denies dysuria, hematuria, urinary incontinence, frequency or urgency.        Objective:     /84 (BP Location: Right arm, Patient Position: Sitting)   Pulse 75   Temp 36.7 °C (98.1 °F) (Temporal)   Ht 1.778 m (5' 10\")   Wt 97.7 kg (215 lb 6.4 oz)   SpO2 99%  Body mass index is 30.91 kg/m².    Physical Exam:  Vitals reviewed.  Constitutional: Oriented to person, place, and time. appears well-developed and well-nourished. No distress.   Cardiovascular: Normal rate, regular rhythm, normal heart sounds and intact distal pulses. Exam reveals no gallop and no friction rub. No murmur heard. No carotid bruits.   Pulmonary/Chest: Effort normal and breath sounds normal. No stridor. No respiratory distress. no wheezes or rales. exhibits no tenderness.   Musculoskeletal: Normal range of motion. exhibits no edema. otoniel pedal pulses 2+.  Lymphadenopathy: No cervical or supraclavicular adenopathy.   Neurological: Alert and oriented to person, place, and time. exhibits normal muscle tone.  Skin: Skin is warm and dry. No diaphoresis.   Psychiatric: Normal mood and affect. Behavior is normal.      Assessment and Plan:     The following treatment plan was discussed:    1. Generalized anxiety disorder  REFERRAL TO PSYCHIATRY    uncontrolled.  failed multiple meds.  dc effexor d/t s/e.  refer psychiatry.  f/u after that eval.    2. Hypertension, essential      stable and well controlled on lisinopril.          Followup: Return if symptoms worsen or fail to improve.  "

## 2021-05-14 DIAGNOSIS — I10 ESSENTIAL HYPERTENSION: ICD-10-CM

## 2021-05-15 RX ORDER — LISINOPRIL 10 MG/1
10 TABLET ORAL DAILY
Qty: 90 TABLET | Refills: 1 | Status: SHIPPED | OUTPATIENT
Start: 2021-05-15 | End: 2022-05-31

## 2022-03-25 ENCOUNTER — TELEPHONE (OUTPATIENT)
Dept: MEDICAL GROUP | Facility: MEDICAL CENTER | Age: 47
End: 2022-03-25

## 2022-03-25 NOTE — TELEPHONE ENCOUNTER
Patient came in to ask about lab orders. I didn't see any to print out for him. He said he'd like to get them done before he schedules an appointment. Can you please give him a call if this can be done  -thanks

## 2022-04-11 ENCOUNTER — TELEPHONE (OUTPATIENT)
Dept: MEDICAL GROUP | Facility: MEDICAL CENTER | Age: 47
End: 2022-04-11
Payer: COMMERCIAL

## 2022-04-11 DIAGNOSIS — Z00.00 ANNUAL PHYSICAL EXAM: ICD-10-CM

## 2022-04-11 DIAGNOSIS — I10 PRIMARY HYPERTENSION: ICD-10-CM

## 2022-05-18 ENCOUNTER — APPOINTMENT (OUTPATIENT)
Dept: MEDICAL GROUP | Facility: MEDICAL CENTER | Age: 47
End: 2022-05-18
Payer: COMMERCIAL

## 2022-05-31 ENCOUNTER — OFFICE VISIT (OUTPATIENT)
Dept: MEDICAL GROUP | Facility: MEDICAL CENTER | Age: 47
End: 2022-05-31
Payer: COMMERCIAL

## 2022-05-31 VITALS
BODY MASS INDEX: 27.97 KG/M2 | HEIGHT: 70 IN | HEART RATE: 91 BPM | TEMPERATURE: 96.9 F | OXYGEN SATURATION: 97 % | DIASTOLIC BLOOD PRESSURE: 74 MMHG | WEIGHT: 195.4 LBS | SYSTOLIC BLOOD PRESSURE: 116 MMHG

## 2022-05-31 DIAGNOSIS — J30.1 CHRONIC ALLERGIC RHINITIS DUE TO POLLEN: ICD-10-CM

## 2022-05-31 DIAGNOSIS — M21.961 KNEE DEFORMITY, ACQUIRED, RIGHT: ICD-10-CM

## 2022-05-31 DIAGNOSIS — R05.9 COUGH: ICD-10-CM

## 2022-05-31 DIAGNOSIS — F33.41 RECURRENT MAJOR DEPRESSIVE DISORDER, IN PARTIAL REMISSION (HCC): ICD-10-CM

## 2022-05-31 DIAGNOSIS — N52.9 ERECTILE DYSFUNCTION, UNSPECIFIED ERECTILE DYSFUNCTION TYPE: ICD-10-CM

## 2022-05-31 DIAGNOSIS — M25.522 LEFT ELBOW PAIN: ICD-10-CM

## 2022-05-31 DIAGNOSIS — Z11.3 SCREEN FOR STD (SEXUALLY TRANSMITTED DISEASE): ICD-10-CM

## 2022-05-31 LAB
EXTERNAL QUALITY CONTROL: NORMAL
SARS-COV+SARS-COV-2 AG RESP QL IA.RAPID: NEGATIVE

## 2022-05-31 PROCEDURE — 99214 OFFICE O/P EST MOD 30 MIN: CPT | Performed by: NURSE PRACTITIONER

## 2022-05-31 PROCEDURE — 87426 SARSCOV CORONAVIRUS AG IA: CPT | Performed by: NURSE PRACTITIONER

## 2022-05-31 RX ORDER — MONTELUKAST SODIUM 10 MG/1
10 TABLET ORAL DAILY
Qty: 30 TABLET | Refills: 6 | Status: SHIPPED | OUTPATIENT
Start: 2022-05-31 | End: 2022-06-09

## 2022-05-31 RX ORDER — TADALAFIL 20 MG/1
20 TABLET ORAL PRN
Qty: 6 TABLET | Refills: 3 | Status: SHIPPED | OUTPATIENT
Start: 2022-05-31 | End: 2023-03-04

## 2022-05-31 RX ORDER — ALBUTEROL SULFATE 90 UG/1
2 AEROSOL, METERED RESPIRATORY (INHALATION) EVERY 4 HOURS PRN
Qty: 1 EACH | Refills: 0 | Status: SHIPPED | OUTPATIENT
Start: 2022-05-31 | End: 2022-06-09

## 2022-05-31 ASSESSMENT — FIBROSIS 4 INDEX: FIB4 SCORE: 1

## 2022-05-31 NOTE — PROGRESS NOTES
Subjective:     Ramsey Allen is a 47 y.o. male who presents with URI.    HPI:   Seen in f/u for URI.  He was going to do his lab but got sinus infection in november.  Took awhile to resolved.  Now he is having sx again.  Started 1 month ago.  + fatiged, chest congestion, cough with clear secretions.  For last several days it was brown secretion.  He got dehydrated last wk.  Urine in dark.  Had constipation and gas pain.  That is now better.  He is now trying to drink lots of water. He was having some wheezing.  Needs inhaler.  Using claritin.    He has been loosing wt.  Lost 20 lbs.    In april he was in a bad spot.  He has been seeing a therapist.  Initially was seeing weekly but now seeing every other week.  Then last may he started eating healthy and doing exercising regularly and hiking.  The therapist thinks that he has bipolar.  He got rid of toxic people in his life and got back to his eliseo.   Inchanging his life he stopped his bp med.  He has been off for a year.   He is now in a new relationship.  He needs to be tested for STD's.    He has been of his testosterone for a year.  He is still on cialis and needs refill.   He has left elbow pain.  Doesn't know if epicondylitis or other.  Wearing a sleeve.    He will set off the xray at the airport with his left knee.  ? Why.  No hxof surgery.    Patient Active Problem List    Diagnosis Date Noted   • Obesity (BMI 30-39.9) 12/14/2016   • Left-sided tinnitus 11/11/2016   • Vitamin d deficiency 03/20/2012   • Preventative health care 09/21/2011   • Recurrent sinusitis    • Post traumatic stress disorder (PTSD)    • Low back pain 07/08/2010   • HTN (hypertension) 04/28/2010   • Anxiety 10/21/2009   • Erectile dysfunction 08/04/2009       Current medicines (including changes today)  Current Outpatient Medications   Medication Sig Dispense Refill   • montelukast (SINGULAIR) 10 MG Tab Take 1 Tablet by mouth every day. 30 Tablet 6   • albuterol 108 (90  "Base) MCG/ACT Aero Soln inhalation aerosol Inhale 2 Puffs every four hours as needed for Shortness of Breath. 1 Each 0   • tadalafil (CIALIS) 20 MG tablet Take 1 Tablet by mouth as needed for Erectile Dysfunction. 6 Tablet 3   • Loratadine (CLARITIN) 10 MG CAPS Take  by mouth.       No current facility-administered medications for this visit.       Allergies   Allergen Reactions   • Erythromycin    • Ilosone [Erythromycin Estolate]        ROS  Constitutional: Negative. Negative for fever, chills, weight loss, malaise/fatigue and diaphoresis.   HENT: Negative. Negative for hearing loss, ear pain, nosebleeds, congestion, sore throat, neck pain, tinnitus and ear discharge.   Respiratory: Negative. Negative for cough, hemoptysis, sputum production, shortness of breath, wheezing and stridor.   Cardiovascular: Negative. Negative for chest pain, palpitations, orthopnea, claudication, leg swelling and PND.   Gastrointestinal: Denies nausea, vomiting, diarrhea, constipation, heartburn, melena or hematochezia.  Genitourinary: Denies dysuria, hematuria, urinary incontinence, frequency or urgency.        Objective:     /74 (BP Location: Right arm, Patient Position: Sitting)   Pulse 91   Temp 36.1 °C (96.9 °F) (Temporal)   Ht 1.778 m (5' 10\")   Wt 88.6 kg (195 lb 6.4 oz)   SpO2 97%  Body mass index is 28.04 kg/m².    Physical Exam:  Physical Exam   Vitals reviewed.  Constitutional: oriented to person, place, and time. appears well-developed and well-nourished. No distress.   HENT:  Head: Normocephalic and atraumatic. Right Ear: External ear normal. Left Ear: External ear normal. Nose: Nose normal. Mouth/Throat: Oropharynx is clear and moist. No oropharyngeal exudate.  otoniel tm wnl.  Eyes: Right eye exhibits no discharge. Left eye exhibits no discharge. No scleral icterus.  Neck: No JVD present.otoniel enlarged tonsils  Cardiovascular: Normal rate, regular rhythm, normal heart sounds and intact distal pulses.  Exam reveals " no gallop and no friction rub.  No murmur heard.  No carotid bruits.   Pulmonary/Chest: Effort normal and breath sounds normal. No stridor. No respiratory distress. no wheezes or rales. exhibits no tenderness.   Musculoskeletal: Normal range of motion. exhibits no edema. otoniel pedal pulses 2+.  Lymphadenopathy: no cervical or supraclavicular adenopathy.   Neurological: alert and oriented to person, place, and time. exhibits normal muscle tone. Coordination normal.   Skin: Skin is warm and dry. no diaphoresis.   Psychiatric: normal mood and affect. behavior is normal.       Assessment and Plan:     The following treatment plan was discussed:    1. Chronic allergic rhinitis due to pollen  montelukast (SINGULAIR) 10 MG Tab    albuterol 108 (90 Base) MCG/ACT Aero Soln inhalation aerosol    Referral to Allergy    add singulair and refer allergy.  COVID test in ofc negative   2. Erectile dysfunction, unspecified erectile dysfunction type  tadalafil (CIALIS) 20 MG tablet    refilled cialis   3. Screen for STD (sexually transmitted disease)  Chlamydia/GC, PCR (Urine)    HIV ANTIBODIES    HEPATITIS PANEL ACUTE(4 COMPONENTS)    HSV 1/2 IGG W/ TYPE SPECIFIC RFLX    in a new relationship and wants to be safe.   4. Recurrent major depressive disorder, in partial remission (HCC)      not on meds but followed by therapist.  poss dx is bipolar.  controlled with healthy lifestyle and exercise.     5. Left elbow pain  Referral to Orthopedics    poss epicondylitis vs tendon tear.  continue sleeve.  refer orhto   6. Knee deformity, acquired, right  DX-KNEE COMPLETE 4+ RIGHT    knee sets off scanner at airport for ? reason.  no hx of surgery or injury.  do xray rt knee         Followup: Return in about 1 week (around 6/7/2022).already scheduled

## 2022-06-01 ENCOUNTER — HOSPITAL ENCOUNTER (OUTPATIENT)
Dept: LAB | Facility: MEDICAL CENTER | Age: 47
End: 2022-06-01
Attending: NURSE PRACTITIONER
Payer: COMMERCIAL

## 2022-06-01 DIAGNOSIS — I10 PRIMARY HYPERTENSION: ICD-10-CM

## 2022-06-01 DIAGNOSIS — Z11.3 SCREEN FOR STD (SEXUALLY TRANSMITTED DISEASE): ICD-10-CM

## 2022-06-01 DIAGNOSIS — Z00.00 ANNUAL PHYSICAL EXAM: ICD-10-CM

## 2022-06-01 LAB
25(OH)D3 SERPL-MCNC: 45 NG/ML (ref 30–100)
ALBUMIN SERPL BCP-MCNC: 4.4 G/DL (ref 3.2–4.9)
ALBUMIN/GLOB SERPL: 1.6 G/DL
ALP SERPL-CCNC: 42 U/L (ref 30–99)
ALT SERPL-CCNC: 19 U/L (ref 2–50)
ANION GAP SERPL CALC-SCNC: 11 MMOL/L (ref 7–16)
AST SERPL-CCNC: 17 U/L (ref 12–45)
BASOPHILS # BLD AUTO: 0.8 % (ref 0–1.8)
BASOPHILS # BLD: 0.05 K/UL (ref 0–0.12)
BILIRUB SERPL-MCNC: 0.5 MG/DL (ref 0.1–1.5)
BUN SERPL-MCNC: 18 MG/DL (ref 8–22)
CALCIUM SERPL-MCNC: 9.7 MG/DL (ref 8.5–10.5)
CHLORIDE SERPL-SCNC: 107 MMOL/L (ref 96–112)
CHOLEST SERPL-MCNC: 152 MG/DL (ref 100–199)
CO2 SERPL-SCNC: 23 MMOL/L (ref 20–33)
CREAT SERPL-MCNC: 1.03 MG/DL (ref 0.5–1.4)
CREAT UR-MCNC: 147.54 MG/DL
EOSINOPHIL # BLD AUTO: 0.14 K/UL (ref 0–0.51)
EOSINOPHIL NFR BLD: 2.4 % (ref 0–6.9)
ERYTHROCYTE [DISTWIDTH] IN BLOOD BY AUTOMATED COUNT: 44.6 FL (ref 35.9–50)
FASTING STATUS PATIENT QL REPORTED: NORMAL
GFR SERPLBLD CREATININE-BSD FMLA CKD-EPI: 91 ML/MIN/1.73 M 2
GLOBULIN SER CALC-MCNC: 2.8 G/DL (ref 1.9–3.5)
GLUCOSE SERPL-MCNC: 88 MG/DL (ref 65–99)
HAV IGM SERPL QL IA: NORMAL
HBV CORE IGM SER QL: NORMAL
HBV SURFACE AG SER QL: NORMAL
HCT VFR BLD AUTO: 48.2 % (ref 42–52)
HCV AB SER QL: NORMAL
HDLC SERPL-MCNC: 36 MG/DL
HGB BLD-MCNC: 16.3 G/DL (ref 14–18)
HIV 1+2 AB+HIV1 P24 AG SERPL QL IA: NORMAL
IMM GRANULOCYTES # BLD AUTO: 0.04 K/UL (ref 0–0.11)
IMM GRANULOCYTES NFR BLD AUTO: 0.7 % (ref 0–0.9)
LDLC SERPL CALC-MCNC: 95 MG/DL
LYMPHOCYTES # BLD AUTO: 1.46 K/UL (ref 1–4.8)
LYMPHOCYTES NFR BLD: 24.7 % (ref 22–41)
MCH RBC QN AUTO: 31 PG (ref 27–33)
MCHC RBC AUTO-ENTMCNC: 33.8 G/DL (ref 33.7–35.3)
MCV RBC AUTO: 91.8 FL (ref 81.4–97.8)
MICROALBUMIN UR-MCNC: <1.2 MG/DL
MICROALBUMIN/CREAT UR: NORMAL MG/G (ref 0–30)
MONOCYTES # BLD AUTO: 0.4 K/UL (ref 0–0.85)
MONOCYTES NFR BLD AUTO: 6.8 % (ref 0–13.4)
NEUTROPHILS # BLD AUTO: 3.83 K/UL (ref 1.82–7.42)
NEUTROPHILS NFR BLD: 64.6 % (ref 44–72)
NRBC # BLD AUTO: 0 K/UL
NRBC BLD-RTO: 0 /100 WBC
PLATELET # BLD AUTO: 269 K/UL (ref 164–446)
PMV BLD AUTO: 9.2 FL (ref 9–12.9)
POTASSIUM SERPL-SCNC: 4.7 MMOL/L (ref 3.6–5.5)
PROT SERPL-MCNC: 7.2 G/DL (ref 6–8.2)
RBC # BLD AUTO: 5.25 M/UL (ref 4.7–6.1)
SODIUM SERPL-SCNC: 141 MMOL/L (ref 135–145)
TRIGL SERPL-MCNC: 103 MG/DL (ref 0–149)
TSH SERPL DL<=0.005 MIU/L-ACNC: 1.31 UIU/ML (ref 0.38–5.33)
WBC # BLD AUTO: 5.9 K/UL (ref 4.8–10.8)

## 2022-06-01 PROCEDURE — 82570 ASSAY OF URINE CREATININE: CPT

## 2022-06-01 PROCEDURE — 85025 COMPLETE CBC W/AUTO DIFF WBC: CPT

## 2022-06-01 PROCEDURE — 82043 UR ALBUMIN QUANTITATIVE: CPT

## 2022-06-01 PROCEDURE — 87389 HIV-1 AG W/HIV-1&-2 AB AG IA: CPT

## 2022-06-01 PROCEDURE — 86694 HERPES SIMPLEX NES ANTBDY: CPT

## 2022-06-01 PROCEDURE — 84154 ASSAY OF PSA FREE: CPT

## 2022-06-01 PROCEDURE — 80074 ACUTE HEPATITIS PANEL: CPT

## 2022-06-01 PROCEDURE — 84443 ASSAY THYROID STIM HORMONE: CPT

## 2022-06-01 PROCEDURE — 86696 HERPES SIMPLEX TYPE 2 TEST: CPT

## 2022-06-01 PROCEDURE — 80061 LIPID PANEL: CPT

## 2022-06-01 PROCEDURE — 82306 VITAMIN D 25 HYDROXY: CPT

## 2022-06-01 PROCEDURE — 87591 N.GONORRHOEAE DNA AMP PROB: CPT

## 2022-06-01 PROCEDURE — 87491 CHLMYD TRACH DNA AMP PROBE: CPT

## 2022-06-01 PROCEDURE — 36415 COLL VENOUS BLD VENIPUNCTURE: CPT

## 2022-06-01 PROCEDURE — 80053 COMPREHEN METABOLIC PANEL: CPT

## 2022-06-01 PROCEDURE — 86695 HERPES SIMPLEX TYPE 1 TEST: CPT

## 2022-06-01 PROCEDURE — 84153 ASSAY OF PSA TOTAL: CPT

## 2022-06-02 LAB
C TRACH DNA SPEC QL NAA+PROBE: NEGATIVE
N GONORRHOEA DNA SPEC QL NAA+PROBE: NEGATIVE
SPECIMEN SOURCE: NORMAL

## 2022-06-03 ENCOUNTER — OFFICE VISIT (OUTPATIENT)
Dept: URGENT CARE | Facility: PHYSICIAN GROUP | Age: 47
End: 2022-06-03
Payer: COMMERCIAL

## 2022-06-03 ENCOUNTER — HOSPITAL ENCOUNTER (OUTPATIENT)
Dept: RADIOLOGY | Facility: MEDICAL CENTER | Age: 47
End: 2022-06-03
Attending: NURSE PRACTITIONER
Payer: COMMERCIAL

## 2022-06-03 VITALS
SYSTOLIC BLOOD PRESSURE: 124 MMHG | BODY MASS INDEX: 27.2 KG/M2 | WEIGHT: 190 LBS | DIASTOLIC BLOOD PRESSURE: 86 MMHG | HEIGHT: 70 IN | RESPIRATION RATE: 16 BRPM | HEART RATE: 90 BPM | TEMPERATURE: 99.1 F | OXYGEN SATURATION: 99 %

## 2022-06-03 DIAGNOSIS — R10.9 ABDOMINAL PAIN, UNSPECIFIED ABDOMINAL LOCATION: ICD-10-CM

## 2022-06-03 DIAGNOSIS — K57.92 DIVERTICULITIS: ICD-10-CM

## 2022-06-03 LAB
APPEARANCE UR: NORMAL
BILIRUB UR STRIP-MCNC: NORMAL MG/DL
COLOR UR AUTO: NORMAL
GLUCOSE UR STRIP.AUTO-MCNC: NORMAL MG/DL
KETONES UR STRIP.AUTO-MCNC: NORMAL MG/DL
LEUKOCYTE ESTERASE UR QL STRIP.AUTO: NORMAL
NITRITE UR QL STRIP.AUTO: NORMAL
PH UR STRIP.AUTO: 7.5 [PH] (ref 5–8)
PROT UR QL STRIP: NORMAL MG/DL
PSA FREE MFR SERPL: 15 %
PSA FREE SERPL-MCNC: 0.2 NG/ML
PSA SERPL-MCNC: 1.3 NG/ML (ref 0–4)
RBC UR QL AUTO: NORMAL
SP GR UR STRIP.AUTO: 1.02
UROBILINOGEN UR STRIP-MCNC: 0.2 MG/DL

## 2022-06-03 PROCEDURE — 99214 OFFICE O/P EST MOD 30 MIN: CPT | Performed by: NURSE PRACTITIONER

## 2022-06-03 PROCEDURE — 81002 URINALYSIS NONAUTO W/O SCOPE: CPT | Performed by: NURSE PRACTITIONER

## 2022-06-03 PROCEDURE — 74176 CT ABD & PELVIS W/O CONTRAST: CPT

## 2022-06-03 RX ORDER — AMOXICILLIN AND CLAVULANATE POTASSIUM 875; 125 MG/1; MG/1
1 TABLET, FILM COATED ORAL 2 TIMES DAILY
Qty: 20 TABLET | Refills: 0 | Status: SHIPPED | OUTPATIENT
Start: 2022-06-03 | End: 2022-06-09

## 2022-06-03 ASSESSMENT — ENCOUNTER SYMPTOMS
ABDOMINAL PAIN: 1
RESPIRATORY NEGATIVE: 1
FEVER: 0
CHILLS: 0
SHORTNESS OF BREATH: 0
CONSTIPATION: 1
CONSTITUTIONAL NEGATIVE: 1
CARDIOVASCULAR NEGATIVE: 1
NAUSEA: 0
VOMITING: 0
DIARRHEA: 0

## 2022-06-03 ASSESSMENT — FIBROSIS 4 INDEX: FIB4 SCORE: 0.65

## 2022-06-03 ASSESSMENT — PAIN SCALES - GENERAL: PAINLEVEL: 6=MODERATE PAIN

## 2022-06-03 ASSESSMENT — VISUAL ACUITY: OU: 1

## 2022-06-03 NOTE — PROGRESS NOTES
Subjective:     Ramsey Allen is a 47 y.o. male who presents for Abdominal Pain (Tueday night had pain went away came back Friday and went away now came back today. L pain but radiates all abdomen. Left side tender. )       Abdominal Pain  This is a new problem. The problem has been gradually worsening. Associated symptoms include constipation. Pertinent negatives include no diarrhea, fever, nausea or vomiting.     Last Tuesday, patient started to experience lower abdominal pain.  If it is more towards his left lower quadrant.  This started after he started working out and traveled back via airplane from [place].  Pain went away.  However, came back again today.  Feels similar.  Reports possible constipation.  Denies nausea, vomiting, fever, history of surgeries or urinary symptoms, chest pain, shortness of breath, cough, or other symptoms.  Reports past history of diverticulitis.    Reviewed labs done 6/1/2022.    Patient was screened prior to rooming and denied COVID-19 diagnosis or contact with a person who has been diagnosed or is suspected to have COVID-19. During this visit, appropriate PPE was worn, hand hygiene was performed, and the patient and any visitors were masked.     PMH:  has a past medical history of Anxiety, ED (erectile dysfunction), Hypertension, LBP (low back pain), Meningitis, Post traumatic stress disorder (PTSD), and Recurrent sinusitis.    MEDS:   Current Outpatient Medications:   •  amoxicillin-clavulanate (AUGMENTIN) 875-125 MG Tab, Take 1 Tablet by mouth 2 times a day for 10 days., Disp: 20 Tablet, Rfl: 0  •  tadalafil (CIALIS) 20 MG tablet, Take 1 Tablet by mouth as needed for Erectile Dysfunction., Disp: 6 Tablet, Rfl: 3  •  Loratadine 10 MG Cap, Take  by mouth., Disp: , Rfl:   •  montelukast (SINGULAIR) 10 MG Tab, Take 1 Tablet by mouth every day. (Patient not taking: Reported on 6/3/2022), Disp: 30 Tablet, Rfl: 6  •  albuterol 108 (90 Base) MCG/ACT Aero Soln inhalation  "aerosol, Inhale 2 Puffs every four hours as needed for Shortness of Breath. (Patient not taking: Reported on 6/3/2022), Disp: 1 Each, Rfl: 0    ALLERGIES:   Allergies   Allergen Reactions   • Erythromycin    • Ilosone [Erythromycin Estolate]      SURGHX:   Past Surgical History:   Procedure Laterality Date   • SEPTOTURBINOPLASTY  8/22/08    Performed by LEIDA SOLOMON at SURGERY [facility]   • ANTROSTOMY  8/22/08    Performed by LEIDA SOLOMON at SURGERY [facility]   • SINUSCOPY  8/22/08    Performed by LEIDA SOLOMON at SURGERY [facility]     SOCHX:  reports that he quit smoking about 11 years ago. His smoking use included cigarettes. He quit after 10.00 years of use. He has quit using smokeless tobacco.  His smokeless tobacco use included chew. He reports current alcohol use. He reports current drug use. Drug: Marijuana.     FH: Reviewed with patient, not pertinent to this visit.    Review of Systems   Constitutional: Negative.  Negative for chills, fever and malaise/fatigue.   Respiratory: Negative.  Negative for shortness of breath.    Cardiovascular: Negative.  Negative for chest pain.   Gastrointestinal: Positive for abdominal pain and constipation. Negative for diarrhea, nausea and vomiting.   All other systems reviewed and are negative.    Additional details per HPI.      Objective:     /86   Pulse 90   Temp 37.3 °C (99.1 °F) (Tympanic)   Resp 16   Ht 1.778 m (5' 10\")   Wt 86.2 kg (190 lb)   SpO2 99%   BMI 27.26 kg/m²     Physical Exam  Vitals reviewed.   Constitutional:       General: He is not in acute distress.     Appearance: He is well-developed. He is not ill-appearing or toxic-appearing.   Eyes:      General: Vision grossly intact.      Extraocular Movements: Extraocular movements intact.   Cardiovascular:      Rate and Rhythm: Normal rate.   Pulmonary:      Effort: Pulmonary effort is normal. No respiratory distress.   Abdominal:      General: There is no " distension.      Palpations: Abdomen is soft.      Tenderness: There is abdominal tenderness in the left lower quadrant.   Musculoskeletal:         General: No deformity. Normal range of motion.      Cervical back: Normal range of motion.   Skin:     General: Skin is warm and dry.      Coloration: Skin is not pale.   Neurological:      Mental Status: He is alert and oriented to person, place, and time.      Sensory: No sensory deficit.      Motor: No weakness.   Psychiatric:         Behavior: Behavior normal. Behavior is cooperative.     UA: slightly cloudy, otherwise unremarkable      Assessment/Plan:     1. Abdominal pain, unspecified abdominal location  - POCT Urinalysis  - CT-ABDOMEN-PELVIS W/O; Future    2. Diverticulitis  - amoxicillin-clavulanate (AUGMENTIN) 875-125 MG Tab; Take 1 Tablet by mouth 2 times a day for 10 days.  Dispense: 20 Tablet; Refill: 0    CT pending. Will contact patient.    Differential diagnosis, natural history, supportive care, over-the-counter symptom management per 's instructions, close monitoring, and indications for immediate follow-up discussed.     All questions answered. Patient agrees with the plan of care.    Billing note: 30 minutes was allotted and spent for patient care and coordination of care (not reported separately) including preparing for the visit, obtaining/reviewing history, performing an exam/evaluation, ordering imaging, developing a plan of care, counseling/educating the patient, and documentation. Care specific to this encounter was summarized here. Please refer to the chart for additional details on the care provided.     ADDENDUM:    Called patient regarding CT; Rx sent.

## 2022-06-06 ENCOUNTER — TELEPHONE (OUTPATIENT)
Dept: MEDICAL GROUP | Facility: MEDICAL CENTER | Age: 47
End: 2022-06-06
Payer: COMMERCIAL

## 2022-06-06 LAB
HSV1 GG IGG SER-ACNC: 1.02 IV
HSV1+2 IGG SER IA-ACNC: 1.62 IV
HSV2 GG IGG SER-ACNC: 0.13 IV

## 2022-06-09 ENCOUNTER — OFFICE VISIT (OUTPATIENT)
Dept: MEDICAL GROUP | Facility: MEDICAL CENTER | Age: 47
End: 2022-06-09
Payer: COMMERCIAL

## 2022-06-09 VITALS
WEIGHT: 197 LBS | TEMPERATURE: 97 F | HEART RATE: 86 BPM | SYSTOLIC BLOOD PRESSURE: 148 MMHG | BODY MASS INDEX: 28.2 KG/M2 | DIASTOLIC BLOOD PRESSURE: 90 MMHG | HEIGHT: 70 IN | OXYGEN SATURATION: 97 %

## 2022-06-09 DIAGNOSIS — K57.32 DIVERTICULITIS OF LARGE INTESTINE WITHOUT PERFORATION OR ABSCESS WITHOUT BLEEDING: ICD-10-CM

## 2022-06-09 DIAGNOSIS — E78.6 LOW HDL (UNDER 40): ICD-10-CM

## 2022-06-09 DIAGNOSIS — B00.9 HSV-1 (HERPES SIMPLEX VIRUS 1) INFECTION: ICD-10-CM

## 2022-06-09 PROCEDURE — 99214 OFFICE O/P EST MOD 30 MIN: CPT | Performed by: NURSE PRACTITIONER

## 2022-06-09 RX ORDER — MONTELUKAST SODIUM 10 MG/1
10 TABLET ORAL DAILY
Qty: 30 TABLET | Refills: 0
Start: 2022-06-09 | End: 2022-06-09

## 2022-06-09 ASSESSMENT — FIBROSIS 4 INDEX: FIB4 SCORE: 0.65

## 2022-06-09 NOTE — PROGRESS NOTES
Subjective:     Ramsey Allen is a 47 y.o. male who presents with diverticulitis.    HPI:   Seen in f/u for diverticulitis.  He has had episodes in past but this one was different.  He went to ER.  Ct scan showed diverticuliitis.  He didn't take the antibx but has them incase the sx reoccur.  He is going [place] this [place] and not eat healthy.  hhe will start the antibx if needed. His sx only lasted 1 days.    Since the sx he has been doing long hikes and hanging in sun with friends.  He is drinking 112 oz water daily.  No further jpain.  He has been very anxious about his lab results.  Reviewed results with pt.  HSV I IgG is mildly elevated. His ex-wife had cold sores. He has never  Had any sx.   HSV II IgG, HIV, hepatitis panel, gc/ch cx, GFR, PSA, alb/cr ratio, VITAMIN D, TSH, CMP, CBC is wnl  LP shows trg and LDL are at goal.  HDL is chronically low.  He exercises regularly.  Eats a very healthy diet.        Patient Active Problem List    Diagnosis Date Noted   • Obesity (BMI 30-39.9) 12/14/2016   • Left-sided tinnitus 11/11/2016   • Vitamin d deficiency 03/20/2012   • Preventative health care 09/21/2011   • Recurrent sinusitis    • Post traumatic stress disorder (PTSD)    • Low back pain 07/08/2010   • HTN (hypertension) 04/28/2010   • Anxiety 10/21/2009   • Erectile dysfunction 08/04/2009       Current medicines (including changes today)  Current Outpatient Medications   Medication Sig Dispense Refill   • tadalafil (CIALIS) 20 MG tablet Take 1 Tablet by mouth as needed for Erectile Dysfunction. 6 Tablet 3   • Loratadine 10 MG Cap Take  by mouth.       No current facility-administered medications for this visit.       Allergies   Allergen Reactions   • Erythromycin    • Ilosone [Erythromycin Estolate]        ROS  Constitutional: Negative. Negative for fever, chills, weight loss, malaise/fatigue and diaphoresis.   HENT: Negative. Negative for hearing loss, ear pain, nosebleeds,  "congestion, sore throat, neck pain, tinnitus and ear discharge.   Respiratory: Negative. Negative for cough, hemoptysis, sputum production, shortness of breath, wheezing and stridor.   Cardiovascular: Negative. Negative for chest pain, palpitations, orthopnea, claudication, leg swelling and PND.   Gastrointestinal: Denies nausea, vomiting, diarrhea, constipation, heartburn, melena or hematochezia.  Genitourinary: Denies dysuria, hematuria, urinary incontinence, frequency or urgency.        Objective:     BP (!) 148/90 (BP Location: Right arm, Patient Position: Sitting)   Pulse 86   Temp 36.1 °C (97 °F) (Temporal)   Ht 1.778 m (5' 10\")   Wt 89.4 kg (197 lb)   SpO2 97%  Body mass index is 28.27 kg/m².    Physical Exam:  Vitals reviewed.  Constitutional: Oriented to person, place, and time. appears well-developed and well-nourished. No distress.   Cardiovascular: Normal rate, regular rhythm, normal heart sounds and intact distal pulses. Exam reveals no gallop and no friction rub. No murmur heard. No carotid bruits.   Pulmonary/Chest: Effort normal and breath sounds normal. No stridor. No respiratory distress. no wheezes or rales. exhibits no tenderness.   Musculoskeletal: Normal range of motion. exhibits no edema. otoniel pedal pulses 2+.  Lymphadenopathy: No cervical or supraclavicular adenopathy.   Neurological: Alert and oriented to person, place, and time. exhibits normal muscle tone.  Skin: Skin is warm and dry. No diaphoresis.   Psychiatric: Normal mood and affect. Behavior is normal.      Assessment and Plan:     The following treatment plan was discussed:    1. Diverticulitis of large intestine without perforation or abscess without bleeding      sx resolved w/o antibx.  he is trying to eat good diet.  will use antibx if sx reoccur. plan:  f/u yearly.  call for lab slip   2. HSV-1 (herpes simplex virus 1) infection      never had sx.  no tx needed.   3. Low HDL (under 40)      enc pt to continue healthy diet " and regular exercise.  f/u yearly.  call for lab slip         Followup: Return in about 1 year (around 6/9/2023).

## 2023-02-06 ENCOUNTER — APPOINTMENT (OUTPATIENT)
Dept: MEDICAL GROUP | Facility: MEDICAL CENTER | Age: 48
End: 2023-02-06
Payer: COMMERCIAL

## 2023-04-19 DIAGNOSIS — N52.9 ERECTILE DYSFUNCTION, UNSPECIFIED ERECTILE DYSFUNCTION TYPE: ICD-10-CM

## 2023-04-19 RX ORDER — TADALAFIL 20 MG/1
20 TABLET ORAL PRN
Qty: 6 TABLET | Refills: 0 | Status: SHIPPED | OUTPATIENT
Start: 2023-04-19 | End: 2023-05-22 | Stop reason: SDUPTHER

## 2023-05-22 DIAGNOSIS — E78.6 LOW HDL (UNDER 40): ICD-10-CM

## 2023-05-22 DIAGNOSIS — I10 PRIMARY HYPERTENSION: ICD-10-CM

## 2023-05-22 DIAGNOSIS — Z13.21 ENCOUNTER FOR VITAMIN DEFICIENCY SCREENING: ICD-10-CM

## 2023-05-22 DIAGNOSIS — N52.9 ERECTILE DYSFUNCTION, UNSPECIFIED ERECTILE DYSFUNCTION TYPE: ICD-10-CM

## 2023-05-25 RX ORDER — TADALAFIL 20 MG/1
20 TABLET ORAL PRN
Qty: 3 TABLET | Refills: 0 | Status: SHIPPED | OUTPATIENT
Start: 2023-05-25 | End: 2023-09-13 | Stop reason: SDUPTHER

## 2023-06-14 ENCOUNTER — APPOINTMENT (OUTPATIENT)
Dept: MEDICAL GROUP | Facility: MEDICAL CENTER | Age: 48
End: 2023-06-14
Payer: COMMERCIAL

## 2023-07-11 ENCOUNTER — APPOINTMENT (OUTPATIENT)
Dept: MEDICAL GROUP | Facility: MEDICAL CENTER | Age: 48
End: 2023-07-11
Payer: COMMERCIAL

## 2023-07-25 ENCOUNTER — APPOINTMENT (OUTPATIENT)
Dept: MEDICAL GROUP | Facility: MEDICAL CENTER | Age: 48
End: 2023-07-25
Payer: COMMERCIAL

## 2023-08-06 ENCOUNTER — OFFICE VISIT (OUTPATIENT)
Dept: URGENT CARE | Facility: PHYSICIAN GROUP | Age: 48
End: 2023-08-06
Payer: COMMERCIAL

## 2023-08-06 VITALS
SYSTOLIC BLOOD PRESSURE: 132 MMHG | RESPIRATION RATE: 14 BRPM | HEART RATE: 100 BPM | BODY MASS INDEX: 27.92 KG/M2 | DIASTOLIC BLOOD PRESSURE: 90 MMHG | WEIGHT: 195 LBS | OXYGEN SATURATION: 97 % | TEMPERATURE: 99.5 F | HEIGHT: 70 IN

## 2023-08-06 DIAGNOSIS — R05.1 ACUTE COUGH: ICD-10-CM

## 2023-08-06 DIAGNOSIS — R21 RASH AND NONSPECIFIC SKIN ERUPTION: ICD-10-CM

## 2023-08-06 DIAGNOSIS — J22 LRTI (LOWER RESPIRATORY TRACT INFECTION): ICD-10-CM

## 2023-08-06 DIAGNOSIS — J01.90 ACUTE NON-RECURRENT SINUSITIS, UNSPECIFIED LOCATION: ICD-10-CM

## 2023-08-06 PROCEDURE — 3080F DIAST BP >= 90 MM HG: CPT | Performed by: NURSE PRACTITIONER

## 2023-08-06 PROCEDURE — 99213 OFFICE O/P EST LOW 20 MIN: CPT | Performed by: NURSE PRACTITIONER

## 2023-08-06 PROCEDURE — 3075F SYST BP GE 130 - 139MM HG: CPT | Performed by: NURSE PRACTITIONER

## 2023-08-06 RX ORDER — AMOXICILLIN AND CLAVULANATE POTASSIUM 875; 125 MG/1; MG/1
1 TABLET, FILM COATED ORAL 2 TIMES DAILY
Qty: 10 TABLET | Refills: 0 | Status: SHIPPED | OUTPATIENT
Start: 2023-08-06 | End: 2023-08-11

## 2023-08-06 RX ORDER — DEXTROMETHORPHAN HYDROBROMIDE AND PROMETHAZINE HYDROCHLORIDE 15; 6.25 MG/5ML; MG/5ML
5 SYRUP ORAL EVERY 4 HOURS PRN
Qty: 120 ML | Refills: 0 | Status: SHIPPED | OUTPATIENT
Start: 2023-08-06 | End: 2023-09-13

## 2023-08-06 ASSESSMENT — ENCOUNTER SYMPTOMS
SPUTUM PRODUCTION: 1
SORE THROAT: 0
CARDIOVASCULAR NEGATIVE: 1
WHEEZING: 1
FEVER: 1
CHILLS: 1
SHORTNESS OF BREATH: 0
COUGH: 1

## 2023-08-06 ASSESSMENT — VISUAL ACUITY: OU: 1

## 2023-08-06 ASSESSMENT — FIBROSIS 4 INDEX: FIB4 SCORE: 0.67

## 2023-08-06 NOTE — LETTER
August 6, 2023         Patient: Ramsey Allen   YOB: 1975   Date of Visit: 8/6/2023           To Whom it May Concern:    Ramsey Allen was seen in my clinic on 8/6/2023 due to illness. Due to medical necessity, please excuse patient from work 8/6/2023 as well as for the next 3 days as needed, if needed.    If you have any questions or concerns, please don't hesitate to call.        Sincerely,         INGA Bell.  Electronically Signed

## 2023-08-06 NOTE — PROGRESS NOTES
Subjective:     Ramsey Allen is a 48 y.o. male who presents for Cough (X5 days: Headaches, x3 days ago started to get a cough with wheeze in chest. Then started to get chills, body aches, cough getting worse. Hard to sleep through the night, coughing mucus up which causes pain. ) and Rash (Today noticed blotchy, judy, itchy spots on back. )       Cough  This is a new problem. The cough is Productive of sputum. Associated symptoms include chills, a fever, a rash and wheezing. Pertinent negatives include no sore throat or shortness of breath.   Rash  Associated symptoms include congestion, coughing and a fever. Pertinent negatives include no shortness of breath or sore throat.     Earlier last week, patient started to develop a headache.  Reports he usually gets a headache before onset of sinus infections which he gets often.  Wednesday, started to develop a cough, chest congestion, and wheezing.  Reports cough is productive with yellow phlegm.  Reports chills which is keeping him up at night.  Has mild sinus congestion.  Today, noticed new onset of rash on his back.  Reports mildly itchy/painful red bumps.    Did 3 home COVID test which came back negative.    Review of Systems   Constitutional:  Positive for chills, fever and malaise/fatigue.   HENT:  Positive for congestion. Negative for sore throat.    Respiratory:  Positive for cough, sputum production and wheezing. Negative for shortness of breath.    Cardiovascular: Negative.    Skin:  Positive for rash.   All other systems reviewed and are negative.    Refer to HPI for additional details.    During this visit, appropriate PPE was worn, and hand hygiene was performed.    PMH:  has a past medical history of Anxiety, ED (erectile dysfunction), Hypertension, LBP (low back pain), Meningitis, Post traumatic stress disorder (PTSD), and Recurrent sinusitis.    MEDS:   Current Outpatient Medications:     amoxicillin-clavulanate (AUGMENTIN) 875-125 MG Tab,  "Take 1 Tablet by mouth 2 times a day for 5 days., Disp: 10 Tablet, Rfl: 0    promethazine-dextromethorphan (PROMETHAZINE-DM) 6.25-15 MG/5ML syrup, Take 5 mL by mouth every four hours as needed for Cough., Disp: 120 mL, Rfl: 0    tadalafil (CIALIS) 20 MG tablet, Take 1 Tablet by mouth as needed for Erectile Dysfunction., Disp: 3 Tablet, Rfl: 0    ALLERGIES:   Allergies   Allergen Reactions    Erythromycin     Ilosone [Erythromycin Estolate]      SURGHX:   Past Surgical History:   Procedure Laterality Date    SEPTOTURBINOPLASTY  8/22/08    Performed by LEIDA SOLOMON at [facility]    ANTROSTOMY  8/22/08    Performed by LEIDA SOLOMON at [facility]    SINUSCOPY  8/22/08    Performed by LEIDA SOLOMON at [facility]     SOCHX:  reports that he quit smoking about 13 years ago. His smoking use included cigarettes. He has quit using smokeless tobacco.  His smokeless tobacco use included chew. He reports current alcohol use. He reports current drug use. Drug: Marijuana.    FH: Per HPI as applicable/pertinent.      Objective:     BP (!) 132/90   Pulse 100   Temp 37.5 °C (99.5 °F)   Resp 14   Ht 1.778 m (5' 10\")   Wt 88.5 kg (195 lb)   SpO2 97%   BMI 27.98 kg/m²     Physical Exam  Nursing note reviewed.   Constitutional:       General: He is not in acute distress.     Appearance: He is well-developed. He is not ill-appearing or toxic-appearing.   HENT:      Head: Normocephalic.      Nose:      Right Sinus: Maxillary sinus tenderness and frontal sinus tenderness present.      Left Sinus: Maxillary sinus tenderness and frontal sinus tenderness present.      Mouth/Throat:      Mouth: Mucous membranes are moist.      Pharynx: Oropharynx is clear.   Eyes:      General: Vision grossly intact.      Extraocular Movements: Extraocular movements intact.   Cardiovascular:      Rate and Rhythm: Normal rate and regular rhythm.      Heart sounds: Normal heart sounds. "   Pulmonary:      Effort: Pulmonary effort is normal. No respiratory distress.      Breath sounds: Rhonchi present. No decreased breath sounds.   Musculoskeletal:         General: No deformity. Normal range of motion.      Cervical back: Normal range of motion.   Skin:     General: Skin is warm and dry.      Coloration: Skin is not pale.      Findings: Rash present. Rash is papular. Rash is not pustular or vesicular.          Neurological:      Mental Status: He is alert and oriented to person, place, and time.      Motor: No weakness.   Psychiatric:         Behavior: Behavior normal. Behavior is cooperative.       Assessment/Plan:     1. Acute non-recurrent sinusitis, unspecified location  - amoxicillin-clavulanate (AUGMENTIN) 875-125 MG Tab; Take 1 Tablet by mouth 2 times a day for 5 days.  Dispense: 10 Tablet; Refill: 0    2. LRTI (lower respiratory tract infection)  - amoxicillin-clavulanate (AUGMENTIN) 875-125 MG Tab; Take 1 Tablet by mouth 2 times a day for 5 days.  Dispense: 10 Tablet; Refill: 0    3. Acute cough  - promethazine-dextromethorphan (PROMETHAZINE-DM) 6.25-15 MG/5ML syrup; Take 5 mL by mouth every four hours as needed for Cough.  Dispense: 120 mL; Refill: 0    4. Rash and nonspecific skin eruption    Rx as above sent electronically.  Caution drowsiness with syrup.  May use OTC plain guaifenesin as needed.  Avoid other OTC cough suppressants.    Differential diagnosis, natural history, supportive care, rest, fluids, over-the-counter symptom management per 's instructions, close monitoring, and indications for immediate follow-up discussed.     Unclear etiology for rash.  Does not appear vesicular.  Start OTC hydrocortisone cream.  Monitor.  Return if rash changes/worsens or does not improve.    All questions answered. Patient agrees with the plan of care.    Discharge summary provided via [employer].    Work note provided.

## 2023-08-10 ENCOUNTER — HOSPITAL ENCOUNTER (OUTPATIENT)
Dept: RADIOLOGY | Facility: MEDICAL CENTER | Age: 48
End: 2023-08-10
Payer: COMMERCIAL

## 2023-08-10 ENCOUNTER — OFFICE VISIT (OUTPATIENT)
Dept: URGENT CARE | Facility: PHYSICIAN GROUP | Age: 48
End: 2023-08-10
Payer: COMMERCIAL

## 2023-08-10 VITALS
OXYGEN SATURATION: 97 % | HEIGHT: 70 IN | SYSTOLIC BLOOD PRESSURE: 142 MMHG | HEART RATE: 77 BPM | WEIGHT: 201 LBS | RESPIRATION RATE: 18 BRPM | DIASTOLIC BLOOD PRESSURE: 94 MMHG | TEMPERATURE: 98.6 F | BODY MASS INDEX: 28.77 KG/M2

## 2023-08-10 DIAGNOSIS — J22 LRTI (LOWER RESPIRATORY TRACT INFECTION): ICD-10-CM

## 2023-08-10 DIAGNOSIS — R05.1 ACUTE COUGH: ICD-10-CM

## 2023-08-10 DIAGNOSIS — J01.90 ACUTE NON-RECURRENT SINUSITIS, UNSPECIFIED LOCATION: ICD-10-CM

## 2023-08-10 DIAGNOSIS — R03.0 ELEVATED BP WITHOUT DIAGNOSIS OF HYPERTENSION: ICD-10-CM

## 2023-08-10 PROCEDURE — 71046 X-RAY EXAM CHEST 2 VIEWS: CPT

## 2023-08-10 PROCEDURE — 3080F DIAST BP >= 90 MM HG: CPT

## 2023-08-10 PROCEDURE — 3077F SYST BP >= 140 MM HG: CPT

## 2023-08-10 PROCEDURE — 99213 OFFICE O/P EST LOW 20 MIN: CPT

## 2023-08-10 RX ORDER — PREDNISONE 20 MG/1
20 TABLET ORAL DAILY
Qty: 5 TABLET | Refills: 0 | Status: SHIPPED | OUTPATIENT
Start: 2023-08-10 | End: 2023-08-15

## 2023-08-10 RX ORDER — ALBUTEROL SULFATE 90 UG/1
2 AEROSOL, METERED RESPIRATORY (INHALATION) EVERY 6 HOURS PRN
Qty: 8.5 G | Refills: 0 | Status: SHIPPED | OUTPATIENT
Start: 2023-08-10

## 2023-08-10 RX ORDER — AMOXICILLIN AND CLAVULANATE POTASSIUM 875; 125 MG/1; MG/1
1 TABLET, FILM COATED ORAL 2 TIMES DAILY
Qty: 4 TABLET | Refills: 0 | Status: SHIPPED | OUTPATIENT
Start: 2023-08-10 | End: 2023-08-12

## 2023-08-10 ASSESSMENT — FIBROSIS 4 INDEX: FIB4 SCORE: 0.67

## 2023-08-10 NOTE — PROGRESS NOTES
Chief Complaint   Patient presents with    Cough     Getting worse the last few days, coughing up bloody phlem. Headache. Abdomen pain when coughing. Wheezing and cant get full breaths       HISTORY OF PRESENT ILLNESS: Patient is a pleasant 48 y.o. male who presents to urgent care today patient was seen here approximately 5 days ago, he was diagnosed with sinusitis as well as a lower respiratory tract infection, he was placed on Augmentin.  Patient states he continues to have ongoing issues with a croupy and wet cough.  He states it is causing him to have muscle pain in his abdomen and back.  He states he still feels quite sick despite the use of Augmentin.  He denies any major shortness of breath, no noted fevers, he does note bloody to yellow sputum at times with his cough.    Patient Active Problem List    Diagnosis Date Noted    Obesity (BMI 30-39.9) 12/14/2016    Left-sided tinnitus 11/11/2016    Vitamin d deficiency 03/20/2012    Preventative health care 09/21/2011    Recurrent sinusitis     Post traumatic stress disorder (PTSD)     Low back pain 07/08/2010    HTN (hypertension) 04/28/2010    Anxiety 10/21/2009    Erectile dysfunction 08/04/2009       Allergies:Erythromycin and Ilosone [erythromycin estolate]    Current Outpatient Medications Ordered in Epic   Medication Sig Dispense Refill    albuterol 108 (90 Base) MCG/ACT Aero Soln inhalation aerosol Inhale 2 Puffs every 6 hours as needed for Shortness of Breath. 8.5 g 0    predniSONE (DELTASONE) 20 MG Tab Take 1 Tablet by mouth every day for 5 days. 5 Tablet 0    amoxicillin-clavulanate (AUGMENTIN) 875-125 MG Tab Take 1 Tablet by mouth 2 times a day for 2 days. 4 Tablet 0    amoxicillin-clavulanate (AUGMENTIN) 875-125 MG Tab Take 1 Tablet by mouth 2 times a day for 5 days. 10 Tablet 0    promethazine-dextromethorphan (PROMETHAZINE-DM) 6.25-15 MG/5ML syrup Take 5 mL by mouth every four hours as needed for Cough. 120 mL 0    tadalafil (CIALIS) 20 MG tablet  Take 1 Tablet by mouth as needed for Erectile Dysfunction. 3 Tablet 0     No current Epic-ordered facility-administered medications on file.       Past Medical History:   Diagnosis Date    Anxiety     ED (erectile dysfunction)     Hypertension     LBP (low back pain)     Meningitis     Post traumatic stress disorder (PTSD)     Recurrent sinusitis        Social History     Tobacco Use    Smoking status: Former     Years: 10.00     Types: Cigarettes     Quit date: 2010     Years since quittin.0    Smokeless tobacco: Former     Types: Chew    Tobacco comments:     occationally  quit chewing 11 years ago   Vaping Use    Vaping Use: Never used   Substance Use Topics    Alcohol use: Yes     Alcohol/week: 0.0 oz     Comment: socially    Drug use: Yes     Types: Marijuana     Comment: occ       Family Status   Relation Name Status    Mo  Alive        eye     Fa  Alive    Neg Hx  (Not Specified)     Family History   Problem Relation Age of Onset    Diabetes Neg Hx     Heart Disease Neg Hx     Hypertension Neg Hx     Psychiatric Illness Neg Hx        ROS:  Review of Systems   Constitutional: Negative for fever, chills, weight loss, malaise, and fatigue.   HENT: Negative for ear pain, nosebleeds, congestion, sore throat and neck pain.    Eyes: Negative for vision changes.   Neuro: Negative for headache, sensory changes, weakness, seizure, LOC.   Cardiovascular: Negative for chest pain, palpitations, orthopnea and leg swelling.   Respiratory: Negative for cough, sputum production, shortness of breath and wheezing.   Gastrointestinal: Negative for abdominal pain, nausea, vomiting or diarrhea.   Genitourinary: Negative for dysuria, urgency and frequency.  Musculoskeletal: Negative for falls, neck pain, back pain, joint pain, myalgias.   Skin: Negative for rash, diaphoresis.     Exam:  BP (!) 142/94 (BP Location: Left arm, Patient Position: Sitting, BP Cuff Size: Adult)   Pulse 77   Temp 37 °C (98.6 °F) (Temporal)    "Resp 18   Ht 1.778 m (5' 10\")   Wt 91.2 kg (201 lb)   SpO2 97%   General: well-nourished, well-developed male in NAD  Head: normocephalic, atraumatic  Eyes: PERRLA, no conjunctival injection, acuity grossly intact, lids normal.  Ears: normal shape and symmetry, no tenderness, no discharge. External canals are without any significant edema or erythema. Tympanic membranes are without any inflammation, no effusion. Gross auditory acuity is intact.  Nose: symmetrical without tenderness, no discharge.  Mouth/Throat: reasonable hygiene, no erythema, exudates or tonsillar enlargement.  Neck: no masses, range of motion within normal limits, no tracheal deviation. No obvious thyroid enlargement.   Lymph: no cervical adenopathy. No supraclavicular adenopathy.   Neuro: alert and oriented. Cranial nerves 1-12 grossly intact. No sensory deficit.   Cardiovascular: regular rate and rhythm. No edema.  Pulmonary: no distress. Chest is symmetrical with respiration, no wheezes, crackles, or rhonchi.   Abdomen: soft, non-tender, no guarding, no hepatosplenomegaly.  Musculoskeletal: no clubbing, appropriate muscle tone, gait is stable.  Skin: warm, dry, intact, no clubbing, no cyanosis, no rashes.   Psych: appropriate mood, affect, judgement.     Assessment/Plan:  1. Acute cough  DX-CHEST-2 VIEWS    albuterol 108 (90 Base) MCG/ACT Aero Soln inhalation aerosol    predniSONE (DELTASONE) 20 MG Tab    amoxicillin-clavulanate (AUGMENTIN) 875-125 MG Tab      2. LRTI (lower respiratory tract infection)  albuterol 108 (90 Base) MCG/ACT Aero Soln inhalation aerosol    predniSONE (DELTASONE) 20 MG Tab    amoxicillin-clavulanate (AUGMENTIN) 875-125 MG Tab      3. Elevated BP without diagnosis of hypertension  Referral back to [facility]CP      Patient is a pleasant 48 y.o. male who presents to urgent care today patient was seen here approximately 5 days ago, he was diagnosed with sinusitis as well as a lower respiratory tract infection, he was " placed on Augmentin.  Patient states he continues to have ongoing issues with a croupy and wet cough.  He states it is causing him to have muscle pain in his abdomen and back.  He states he still feels quite sick despite the use of Augmentin.  He denies any major shortness of breath, no noted fevers, he does note bloody to yellow sputum at times with his cough.  On physical exam patient has noted wheezes to the upper lobes as well as crackles.  I also note a congested cough.  Clear nasal drainage and reddened mucosal nasal beds.  Physical exam appears otherwise within normal limits.  X-ray ordered to rule the potential for pneumonia.  I did continue Augmentin for 2 more days as he is only on a 5-day course.  Additionally I added prednisone and albuterol inhaler. Advised patient to drink plenty of fluids, take Motrin and Tylenol as needed, vitamin C, D and zinc.  Patient is aware of the plan of care and agreeable at this time, encouraged them to follow-up if they continue to get worse or do not improve.     Noted elevated blood pressure in the office today, patient advised to keep a log and present to primary care.  He has an upcoming appointment on 8/23.  Patient verbalized understanding.    Supportive care, differential diagnoses, and indications for immediate follow-up discussed with patient.   Pathogenesis of diagnosis discussed including typical length and natural progression.   Instructed to return to clinic or nearest emergency department for any change in condition, further concerns, or worsening of symptoms.  Patient states understanding of the plan of care and discharge instructions.  Instructed to make an appointment, for follow up, with primary care provider.      Please note that this dictation was created using voice recognition software. I have made every reasonable attempt to correct obvious errors, but I expect that there are errors of grammar and possibly content that I did not discover before  finalizing the note.      Felicitas MARQUEZ

## 2023-08-10 NOTE — LETTER
[facility] [address] [address]     August 10, 2023    Patient: Ramsey Allen   YOB: 1975   Date of Visit: 8/10/2023       To Whom It May Concern:    Ramsey Allen was seen and treated in our department on 8/10/2023.     Sincerely,     INGA Osorio.

## 2023-08-23 ENCOUNTER — APPOINTMENT (OUTPATIENT)
Dept: MEDICAL GROUP | Facility: MEDICAL CENTER | Age: 48
End: 2023-08-23
Payer: COMMERCIAL

## 2023-08-30 ENCOUNTER — OFFICE VISIT (OUTPATIENT)
Dept: URGENT CARE | Facility: PHYSICIAN GROUP | Age: 48
End: 2023-08-30
Payer: COMMERCIAL

## 2023-08-30 VITALS
HEIGHT: 70 IN | BODY MASS INDEX: 27.92 KG/M2 | OXYGEN SATURATION: 98 % | RESPIRATION RATE: 16 BRPM | DIASTOLIC BLOOD PRESSURE: 86 MMHG | WEIGHT: 195 LBS | SYSTOLIC BLOOD PRESSURE: 120 MMHG | TEMPERATURE: 98.1 F | HEART RATE: 74 BPM

## 2023-08-30 DIAGNOSIS — L03.116 CELLULITIS OF LEFT LOWER EXTREMITY: ICD-10-CM

## 2023-08-30 PROCEDURE — 3074F SYST BP LT 130 MM HG: CPT

## 2023-08-30 PROCEDURE — 99213 OFFICE O/P EST LOW 20 MIN: CPT

## 2023-08-30 PROCEDURE — 3079F DIAST BP 80-89 MM HG: CPT

## 2023-08-30 RX ORDER — CEPHALEXIN 500 MG/1
500 CAPSULE ORAL 4 TIMES DAILY
Qty: 20 CAPSULE | Refills: 0 | Status: SHIPPED | OUTPATIENT
Start: 2023-08-30 | End: 2023-09-04

## 2023-08-30 ASSESSMENT — FIBROSIS 4 INDEX: FIB4 SCORE: 0.67

## 2023-08-30 NOTE — PROGRESS NOTES
Subjective:   Ramsey Allen is a 48 y.o. male who presents for Insect Bite (LT ANKLE, NOTICED THIS MORNING.)      HPI:    Patient presents urgent care with concerns of infected bug bite located on the back of his left ankle  States he was bit by a spider, did not locate the spider.   Noticed pain and drainage this morning  He notes that the redness around the bite has continued to increase despite applications of ice packs and steroid cream.  He reports pruritus, mild tenderness.  Denies immunosuppression  Denies fever, chills    ROS As above in HPI    Medications:    Current Outpatient Medications on File Prior to Visit   Medication Sig Dispense Refill    albuterol 108 (90 Base) MCG/ACT Aero Soln inhalation aerosol Inhale 2 Puffs every 6 hours as needed for Shortness of Breath. 8.5 g 0    tadalafil (CIALIS) 20 MG tablet Take 1 Tablet by mouth as needed for Erectile Dysfunction. 3 Tablet 0    promethazine-dextromethorphan (PROMETHAZINE-DM) 6.25-15 MG/5ML syrup Take 5 mL by mouth every four hours as needed for Cough. (Patient not taking: Reported on 8/30/2023) 120 mL 0     No current facility-administered medications on file prior to visit.        Allergies:   Erythromycin and Ilosone [erythromycin estolate]    Problem List:   Patient Active Problem List   Diagnosis    Erectile dysfunction    Anxiety    HTN (hypertension)    Low back pain    Recurrent sinusitis    Post traumatic stress disorder (PTSD)    Preventative health care    Vitamin d deficiency    Left-sided tinnitus    Obesity (BMI 30-39.9)        Surgical History:  Past Surgical History:   Procedure Laterality Date    SEPTOTURBINOPLASTY  8/22/08    Performed by LEIDA SOLOMON at [facility]    ANTROSTOMY  8/22/08    Performed by LEIDA SOLOMON at [facility]    SINUSCOPY  8/22/08    Performed by LEIDA SOLOMON at [facility]       Past Social Hx:   Social History     Tobacco Use    Smoking  "status: Former     Current packs/day: 0.00     Types: Cigarettes     Start date: 2000     Quit date: 2010     Years since quittin.1    Smokeless tobacco: Former     Types: Chew    Tobacco comments:     occationally  quit chewing 11 years ago   Vaping Use    Vaping Use: Never used   Substance Use Topics    Alcohol use: Yes     Alcohol/week: 0.0 oz     Comment: socially    Drug use: Yes     Types: Marijuana     Comment: occ          Problem list, medications, and allergies reviewed by myself today in Epic.     Objective:     /86   Pulse 74   Temp 36.7 °C (98.1 °F) (Temporal)   Resp 16   Ht 1.778 m (5' 10\")   Wt 88.5 kg (195 lb)   SpO2 98%   BMI 27.98 kg/m²     Physical Exam  Vitals and nursing note reviewed.   Constitutional:       Appearance: Normal appearance.   Cardiovascular:      Rate and Rhythm: Normal rate and regular rhythm.      Heart sounds: Normal heart sounds.   Pulmonary:      Effort: Pulmonary effort is normal.      Breath sounds: Normal breath sounds.   Abdominal:      General: Bowel sounds are normal.      Palpations: Abdomen is soft.   Musculoskeletal:         General: No swelling, tenderness, deformity or signs of injury.   Skin:     Capillary Refill: Capillary refill takes less than 2 seconds.      Findings: Erythema and lesion present.      Comments: Posterior left lower leg just proximal of the ankle has raised and indurated lesion with two puncture wounds. There is mucoid discharge present. There is TTP, edema. There is a large band of erythema surrounding the lesion.   Neurological:      Mental Status: He is alert and oriented to person, place, and time.         Assessment/Plan:       Diagnosis and associated orders:   1. Cellulitis of left lower extremity  - cephALEXin (KEFLEX) 500 MG Cap; Take 1 Capsule by mouth 4 times a day for 5 days.  Dispense: 20 Capsule; Refill: 0        Comments/MDM:     Recommend patient continue with application of ice packs, hydrocortisone " cream, calamine lotion.  Elevate extremity.  NSAIDs/Tylenol per package instructions for pain and swelling.  Return to urgent care if there are any worsening signs of infection.  Follow-up with primary care is encouraged.          Please note that this dictation was created using voice recognition software. I have made a reasonable attempt to correct obvious errors, but I expect that there are errors of grammar and possibly content that I did not discover before finalizing the note.    This note was electronically signed by Velvet Petty, TIMOTEO, APRN, FNP-C

## 2023-09-06 ENCOUNTER — HOSPITAL ENCOUNTER (OUTPATIENT)
Dept: LAB | Facility: MEDICAL CENTER | Age: 48
End: 2023-09-06
Attending: NURSE PRACTITIONER
Payer: COMMERCIAL

## 2023-09-06 DIAGNOSIS — E78.6 LOW HDL (UNDER 40): ICD-10-CM

## 2023-09-06 DIAGNOSIS — I10 PRIMARY HYPERTENSION: ICD-10-CM

## 2023-09-06 LAB
ALBUMIN SERPL BCP-MCNC: 4.1 G/DL (ref 3.2–4.9)
ALBUMIN/GLOB SERPL: 1.8 G/DL
ALP SERPL-CCNC: 51 U/L (ref 30–99)
ALT SERPL-CCNC: 46 U/L (ref 2–50)
ANION GAP SERPL CALC-SCNC: 10 MMOL/L (ref 7–16)
AST SERPL-CCNC: 29 U/L (ref 12–45)
BILIRUB SERPL-MCNC: 0.7 MG/DL (ref 0.1–1.5)
BUN SERPL-MCNC: 19 MG/DL (ref 8–22)
CALCIUM ALBUM COR SERPL-MCNC: 9.1 MG/DL (ref 8.5–10.5)
CALCIUM SERPL-MCNC: 9.2 MG/DL (ref 8.4–10.2)
CHLORIDE SERPL-SCNC: 105 MMOL/L (ref 96–112)
CHOLEST SERPL-MCNC: 137 MG/DL (ref 100–199)
CO2 SERPL-SCNC: 24 MMOL/L (ref 20–33)
CREAT SERPL-MCNC: 0.96 MG/DL (ref 0.5–1.4)
CREAT UR-MCNC: 85.13 MG/DL
FASTING STATUS PATIENT QL REPORTED: NORMAL
GFR SERPLBLD CREATININE-BSD FMLA CKD-EPI: 98 ML/MIN/1.73 M 2
GLOBULIN SER CALC-MCNC: 2.3 G/DL (ref 1.9–3.5)
GLUCOSE SERPL-MCNC: 101 MG/DL (ref 65–99)
HDLC SERPL-MCNC: 39 MG/DL
LDLC SERPL CALC-MCNC: 80 MG/DL
MICROALBUMIN UR-MCNC: <1.2 MG/DL
MICROALBUMIN/CREAT UR: NORMAL MG/G (ref 0–30)
POTASSIUM SERPL-SCNC: 4.1 MMOL/L (ref 3.6–5.5)
PROT SERPL-MCNC: 6.4 G/DL (ref 6–8.2)
SODIUM SERPL-SCNC: 139 MMOL/L (ref 135–145)
TRIGL SERPL-MCNC: 90 MG/DL (ref 0–149)

## 2023-09-06 PROCEDURE — 36415 COLL VENOUS BLD VENIPUNCTURE: CPT

## 2023-09-06 PROCEDURE — 82043 UR ALBUMIN QUANTITATIVE: CPT

## 2023-09-06 PROCEDURE — 80061 LIPID PANEL: CPT

## 2023-09-06 PROCEDURE — 82570 ASSAY OF URINE CREATININE: CPT

## 2023-09-06 PROCEDURE — 80053 COMPREHEN METABOLIC PANEL: CPT

## 2023-09-12 ENCOUNTER — APPOINTMENT (OUTPATIENT)
Dept: MEDICAL GROUP | Facility: MEDICAL CENTER | Age: 48
End: 2023-09-12
Payer: COMMERCIAL

## 2023-09-13 ENCOUNTER — OFFICE VISIT (OUTPATIENT)
Dept: MEDICAL GROUP | Facility: MEDICAL CENTER | Age: 48
End: 2023-09-13
Payer: COMMERCIAL

## 2023-09-13 VITALS
BODY MASS INDEX: 28.35 KG/M2 | HEART RATE: 89 BPM | SYSTOLIC BLOOD PRESSURE: 130 MMHG | RESPIRATION RATE: 17 BRPM | DIASTOLIC BLOOD PRESSURE: 80 MMHG | OXYGEN SATURATION: 97 % | HEIGHT: 70 IN | WEIGHT: 198 LBS | TEMPERATURE: 98.8 F

## 2023-09-13 DIAGNOSIS — Z12.5 SCREENING FOR MALIGNANT NEOPLASM OF PROSTATE: ICD-10-CM

## 2023-09-13 DIAGNOSIS — Z12.11 SCREENING FOR MALIGNANT NEOPLASM OF COLON: ICD-10-CM

## 2023-09-13 DIAGNOSIS — F40.10 SOCIAL ANXIETY DISORDER: ICD-10-CM

## 2023-09-13 DIAGNOSIS — N52.9 ERECTILE DYSFUNCTION, UNSPECIFIED ERECTILE DYSFUNCTION TYPE: ICD-10-CM

## 2023-09-13 DIAGNOSIS — E55.9 VITAMIN D DEFICIENCY: ICD-10-CM

## 2023-09-13 DIAGNOSIS — H91.93 DECREASED HEARING OF BOTH EARS: ICD-10-CM

## 2023-09-13 DIAGNOSIS — H93.13 TINNITUS OF BOTH EARS: ICD-10-CM

## 2023-09-13 DIAGNOSIS — J30.89 CHRONIC NON-SEASONAL ALLERGIC RHINITIS: ICD-10-CM

## 2023-09-13 DIAGNOSIS — R41.840 DIFFICULTY CONCENTRATING: ICD-10-CM

## 2023-09-13 DIAGNOSIS — J06.9 RECURRENT URI (UPPER RESPIRATORY INFECTION): ICD-10-CM

## 2023-09-13 PROCEDURE — 3079F DIAST BP 80-89 MM HG: CPT | Performed by: NURSE PRACTITIONER

## 2023-09-13 PROCEDURE — 99214 OFFICE O/P EST MOD 30 MIN: CPT | Performed by: NURSE PRACTITIONER

## 2023-09-13 PROCEDURE — 3075F SYST BP GE 130 - 139MM HG: CPT | Performed by: NURSE PRACTITIONER

## 2023-09-13 RX ORDER — TADALAFIL 20 MG/1
20 TABLET ORAL PRN
Qty: 6 TABLET | Refills: 11 | Status: SHIPPED | OUTPATIENT
Start: 2023-09-13

## 2023-09-13 RX ORDER — ALFUZOSIN HYDROCHLORIDE 10 MG/1
10 TABLET, EXTENDED RELEASE ORAL DAILY
Qty: 30 TABLET | Refills: 0 | Status: SHIPPED | OUTPATIENT
Start: 2023-09-13 | End: 2023-10-08

## 2023-09-13 ASSESSMENT — FIBROSIS 4 INDEX: FIB4 SCORE: 0.73

## 2023-09-13 ASSESSMENT — PATIENT HEALTH QUESTIONNAIRE - PHQ9: CLINICAL INTERPRETATION OF PHQ2 SCORE: 0

## 2023-09-13 NOTE — PROGRESS NOTES
Subjective:     Ramsey Allen is a 48 y.o. male who presents with difficulty focusing.    HPI:   Seen in fu for difficulty focusing.  He had COVID in january.  He has since not been able to exercise d/t joint pain and fatigue. He restarted exercise in july but quickly got URI that was not COVID.  He is still having chest congestion.   Then 2 wks ago he had a spider bite that got infection.  He was on antibx when he did his lab.   He is on cialis for ED. Needs refills.he is only getting 6 pills/mo.  He has called his insurance to see what they will pay for to help his sx.  He is having allergy sx.  He has SOB and wheezing.  He is trying allerclear.  Still slowly getting over both COVID and recent URI.  Would like to see allergist for testing  He has had hearing testing over the last year.  Tinnitus is getting louder.  His hearing is also getting worse.   He has been having difficulty with focusing.  He has poor concentration.  He can read a book and not understand what he is reading.  Cannot follow converstations worse on phone.  This is causing a lot of social anxiety since he cannot hear people. He has a friend that has similar sx that has ADD.  His mom has told him that he was dx with ADD as a child.  He is only able to be happy when he is out in nature.  He cannot stand being at home.    He has ED. He would like more cialis. He spoke to his ins and they recommended urolatrol.  They told him he could take it daily and that would help his ED. He is still using cialis.   Reviewed lab with pt.  GFR, CMP, alb/cr ratio is wnl  LP shows trg and LDL at goal. HDL chronic low in 30's.  He is exercising with hiking. Tries to follow healthy diet. Not on statin.    Patient Active Problem List    Diagnosis Date Noted    Obesity (BMI 30-39.9) 12/14/2016    Left-sided tinnitus 11/11/2016    Vitamin d deficiency 03/20/2012    Preventative health care 09/21/2011    Recurrent sinusitis     Post traumatic stress disorder  "(PTSD)     Low back pain 07/08/2010    HTN (hypertension) 04/28/2010    Anxiety 10/21/2009    Erectile dysfunction 08/04/2009       Current medicines (including changes today)  Current Outpatient Medications   Medication Sig Dispense Refill    tadalafil (CIALIS) 20 MG tablet Take 1 Tablet by mouth as needed for Erectile Dysfunction. 6 Tablet 11    alfuzosin (UROXATRAL) 10 MG SR tablet Take 1 Tablet by mouth every day. 30 Tablet 0    albuterol 108 (90 Base) MCG/ACT Aero Soln inhalation aerosol Inhale 2 Puffs every 6 hours as needed for Shortness of Breath. 8.5 g 0     No current facility-administered medications for this visit.       Allergies   Allergen Reactions    Erythromycin     Ilosone [Erythromycin Estolate]        ROS  Constitutional: Negative. Negative for fever, chills, weight loss, malaise/fatigue and diaphoresis.   HENT: Negative. Negative for hearing loss, ear pain, nosebleeds, congestion, sore throat, neck pain, tinnitus and ear discharge.   Respiratory: Negative. Negative for cough, hemoptysis, sputum production, shortness of breath, wheezing and stridor.   Cardiovascular: Negative. Negative for chest pain, palpitations, orthopnea, claudication, leg swelling and PND.   Gastrointestinal: Denies nausea, vomiting, diarrhea, constipation, heartburn, melena or hematochezia.  Genitourinary: Denies dysuria, hematuria, urinary incontinence, frequency or urgency.        Objective:     /80 (BP Location: Right arm, Patient Position: Sitting, BP Cuff Size: Adult)   Pulse 89   Temp 37.1 °C (98.8 °F) (Temporal)   Resp 17   Ht 1.778 m (5' 10\")   Wt 89.8 kg (198 lb)   SpO2 97%  Body mass index is 28.41 kg/m².    Physical Exam:  Vitals reviewed.  Constitutional: Oriented to person, place, and time. appears well-developed and well-nourished. No distress.   Cardiovascular: Normal rate, regular rhythm, normal heart sounds and intact distal pulses. Exam reveals no gallop and no friction rub. No murmur heard. No " carotid bruits.   Pulmonary/Chest: Effort normal and breath sounds normal. No stridor. No respiratory distress. no wheezes or rales. exhibits no tenderness.   Musculoskeletal: Normal range of motion. exhibits no edema. otoniel pedal pulses 2+.  Lymphadenopathy: No cervical or supraclavicular adenopathy.   Neurological: Alert and oriented to person, place, and time. exhibits normal muscle tone.  Skin: Skin is warm and dry. No diaphoresis.   Psychiatric: Normal mood and affect. Behavior is normal.      Assessment and Plan:     The following treatment plan was discussed:    1. Social anxiety disorder  Referral to Psychiatry    having more social anxiety d/t [place]. refer audiology      2. Difficulty concentrating  Referral to Psychiatry    refer psychiatry for eval of ADD vs ADHD      3. Erectile dysfunction, unspecified erectile dysfunction type  tadalafil (CIALIS) 20 MG tablet    alfuzosin (UROXATRAL) 10 MG SR tablet    PSA TOTAL + %FREE    Testosterone, Free & Total, Adult Male (w/SHBG)    refilled cialis. will try uroxatrol as recommended by his insurance. he would also like his testosterone check      4. Recurrent URI (upper respiratory infection)  CBC WITH DIFFERENTIAL    Referral to Allergy    Referral to ENT    Has had multiple URI starting with COVID in january. he feels they are overall effecting his baseline health.       5. Decreased hearing of both ears  Referral to Allergy    Referral to ENT    Referral to Audiology    Referral to Psychiatry    refer audiology for hearing eval. pt feels causing his social anxiety and distancing him from his friend.      6. Tinnitus of both ears  Referral to Allergy    Referral to ENT    Referral to Audiology    refer to audiology, ENT, allergy for tx of allergies, dec hearing and tinnitus.      7. Chronic non-seasonal allergic rhinitis  Referral to Allergy    Referral to ENT    refer allergy for testing d/t sx not well controlled.      8. Vitamin D deficiency  VITAMIN D,25  HYDROXY (DEFICIENCY)    needs updated vitamin d lab.       9. Screening for malignant neoplasm of colon  Referral to Gastroenterology    refer GI for colonosocpy      10. Screening for malignant neoplasm of prostate  PSA TOTAL + %FREE    check lab wiht PSA            Followup: Return in about 3 months (around 12/13/2023), or for test review and sx eval.

## 2023-10-04 ENCOUNTER — OFFICE VISIT (OUTPATIENT)
Dept: BEHAVIORAL HEALTH | Facility: CLINIC | Age: 48
End: 2023-10-04
Payer: COMMERCIAL

## 2023-10-04 DIAGNOSIS — F41.9 ANXIETY: ICD-10-CM

## 2023-10-04 PROCEDURE — 99204 OFFICE O/P NEW MOD 45 MIN: CPT | Mod: GC | Performed by: STUDENT IN AN ORGANIZED HEALTH CARE EDUCATION/TRAINING PROGRAM

## 2023-10-04 RX ORDER — HYDROXYZINE HYDROCHLORIDE 25 MG/1
25 TABLET, FILM COATED ORAL 3 TIMES DAILY PRN
Qty: 30 TABLET | Refills: 0 | Status: SHIPPED | OUTPATIENT
Start: 2023-10-04 | End: 2023-10-18 | Stop reason: SDUPTHER

## 2023-10-04 NOTE — PROGRESS NOTES
"Evaluation completed by: Sandeep Pickering M.D.   Date of Service: 10/04/23   Appointment type: in-office appointment.    Information below was collected from: patient      CHIEF COMPLIANT:  Psychiatric Evaluation      HPI:   Ramsey Allen is a 48 y.o. old male who presents today for initial psychiatric evaluation to address Psychiatric Evaluation  .     History of ADHD in 20s    History of Anxiety and Depression  -tried many meds  -worse during COVID, other respiratory infections  -Paranoia about technology  -intrusive thoughts about \"what if\"  -financial concerns  -agoraphobia affects him at work ( [employer])  -worries about many things (family, house burning)  -ruminates on technology fears while talking to people (especially on the phone)    Using Cannabis to help relax/sleep    Has tried to use lifestyle modifications to help  -outdoor activities  -exercise  -feels better while moving    Hearing problems  -worse since meningitis    COVID in January   -attacked his body    Has been to therapy on and off    Haley is a strong protective factor - denies current or previous SI    CURRENT MEDICATIONS    Current Outpatient Medications:     tadalafil (CIALIS) 20 MG tablet, Take 1 Tablet by mouth as needed for Erectile Dysfunction., Disp: 6 Tablet, Rfl: 11    alfuzosin (UROXATRAL) 10 MG SR tablet, Take 1 Tablet by mouth every day., Disp: 30 Tablet, Rfl: 0    albuterol 108 (90 Base) MCG/ACT Aero Soln inhalation aerosol, Inhale 2 Puffs every 6 hours as needed for Shortness of Breath., Disp: 8.5 g, Rfl: 0    Psychiatric Review of Systems:current symptoms as reported by pt.  Depression: Difficulty concentrating  Miguelina: Distractibility  Anxiety/Panic Attacks: difficulty concentrating, worrying, paranoia, discomfort in crowded spaces  PTSD symptom: hypervigilance  Psychosis:  paranoia  ADHD: has difficulty sustaining attention in tasks or play activities, is easily distracted by extraneous stimuli, runs about or " climbs excessively  Tics/Abnormal movements: denies  Eating Disorders: denies  Behavioral/Aggression: Calm  Substance Use: denies    MEDICAL REVIEW OF SYSTEMS   Constitutional: Negative for fever, chills, weight loss  HENT:  hearing loss since meningitis  Eyes: Negative for blurred vision, pain, redness.   Respiratory: Negative for cough, shortness of breath, wheezing   Cardiovascular: Negative for chest pain, palpitations  Gastrointestinal: Negative for nausea, vomiting, diarrhea, constipation, blood in stool  Musculoskeletal: Negative for myalgias,  joint pain  Neurological: No seizures or loss of concioussness      PAST PSYCHIATRIC HISTORY  -Previous Psychiatric Diagnosis: Generalized Anxiety Disorder and ADHD  -Inpatient Psychiatric Hospitalizations: denies  -Outpatient Psychiatric Care:  in the past - tried meds for anxiety  -Self Harm: denies  -Suicide Attempts: denies      MEDICAL HISTORY  Other Medical History:   Past Medical History:   Diagnosis Date    Anxiety     ED (erectile dysfunction)     Hypertension     LBP (low back pain)     Meningitis     Post traumatic stress disorder (PTSD)     Recurrent sinusitis      Allergies:   Allergies   Allergen Reactions    Erythromycin     Ilosone [Erythromycin Estolate]        SURGICAL HISTORY  Past Surgical History:   Procedure Laterality Date    SEPTOTURBINOPLASTY  8/22/08    Performed by LEIDA SOLOMON at [facility]    ANTROSTOMY  8/22/08    Performed by LEIDA SOLOMON at [facility]    SINUSCOPY  8/22/08    Performed by LEIDA SOLOMON at [facility]        FAMILY PSYCHIATRIC HISTORY  Family History   Problem Relation Age of Onset    Diabetes Neg Hx     Heart Disease Neg Hx     Hypertension Neg Hx     Psychiatric Illness Neg Hx        SOCIAL HISTORY    Strong eliseo - Gnosticism    SUBSTANCE USE HISTORY  Alcohol: Patient denies the use of alcohol  Tobacco: Patient denies the use of tobacco  "products  Cannabis: daily use and uses for relaxation  Opioids: denies  Other prescription medications: denies  Other: denies    PSYCHIATRIC EXAMINATION   Vitals: There were no vitals taken for this visit.  Musculoskeletal: No abnormal movements noted  Abnormal Movements: none  Gait:within normal limits      General:   - Grooming and hygiene: Casual  - Apparent distress: NAD   - Behavior: Calm  - Eye Contact:  Good  - no psychomotor agitation or retardation   - Participation: Active verbal participation, Attentive, Engaged, and Open to feedback  Orientation:Alert and Fully Oriented to person, place and time  Mood: \"good\"  Affect: Flexible, Full range, Congruent with content, and Congruent to stated mood  Thought Process: Linear, Logical, and Goal-directed  Thought Content: Within normal limits  Perception: Denies auditory or visual hallucinations. No delusions noted Within normal limits  Attention span and concentration: Intact   Speech:Clear with normal rate and rhythm  Language: Appropriate spontaneous, comprehends spoken commands, and fluent  Insight: Good  Judgment:  Good  Recent and remote memory: No gross evidence of memory deficits    LABS:  Hospital Outpatient Visit on 09/06/2023   Component Date Value    Creatinine, Urine 09/06/2023 85.13     Microalbumin, Urine Rand* 09/06/2023 <1.2     Micro Alb Creat Ratio 09/06/2023 see below     Sodium 09/06/2023 139     Potassium 09/06/2023 4.1     Chloride 09/06/2023 105     Co2 09/06/2023 24     Anion Gap 09/06/2023 10.0     Glucose 09/06/2023 101 (H)     Bun 09/06/2023 19     Creatinine 09/06/2023 0.96     Calcium 09/06/2023 9.2     Correct Calcium 09/06/2023 9.1     AST(SGOT) 09/06/2023 29     ALT(SGPT) 09/06/2023 46     Alkaline Phosphatase 09/06/2023 51     Total Bilirubin 09/06/2023 0.7     Albumin 09/06/2023 4.1     Total Protein 09/06/2023 6.4     Globulin 09/06/2023 2.3     A-G Ratio 09/06/2023 1.8     Cholesterol,Tot 09/06/2023 137     Triglycerides " 09/06/2023 90     HDL 09/06/2023 39 (A)     LDL 09/06/2023 80     Fasting Status 09/06/2023 Fasting     GFR (CKD-EPI) 09/06/2023 98    Office Visit on 06/03/2022   Component Date Value    POC Color 06/03/2022 dark yellow     POC Appearance 06/03/2022 sl cloudy     POC Leukocyte Esterase 06/03/2022 neg     POC Nitrites 06/03/2022 neg     POC Urobiligen 06/03/2022 0.2     POC Protein 06/03/2022 neg     POC Urine PH 06/03/2022 7.5     POC Blood 06/03/2022 neg     POC Specific Gravity 06/03/2022 1.020     POC Ketones 06/03/2022 neg     POC Bilirubin 06/03/2022 neg     POC Glucose 06/03/2022 neg    Hospital Outpatient Visit on 06/01/2022   Component Date Value    PSA Total 06/01/2022 1.3     Free Psa 06/01/2022 0.2     % Free Psa 06/01/2022 15     25-Hydroxy   Vitamin D 25 06/01/2022 45     TSH 06/01/2022 1.310     Cholesterol,Tot 06/01/2022 152     Triglycerides 06/01/2022 103     HDL 06/01/2022 36 (A)     LDL 06/01/2022 95     Creatinine, Urine 06/01/2022 147.54     Microalbumin, Urine Rand* 06/01/2022 <1.2     Micro Alb Creat Ratio 06/01/2022 see below     Sodium 06/01/2022 141     Potassium 06/01/2022 4.7     Chloride 06/01/2022 107     Co2 06/01/2022 23     Anion Gap 06/01/2022 11.0     Glucose 06/01/2022 88     Bun 06/01/2022 18     Creatinine 06/01/2022 1.03     Calcium 06/01/2022 9.7     AST(SGOT) 06/01/2022 17     ALT(SGPT) 06/01/2022 19     Alkaline Phosphatase 06/01/2022 42     Total Bilirubin 06/01/2022 0.5     Albumin 06/01/2022 4.4     Total Protein 06/01/2022 7.2     Globulin 06/01/2022 2.8     A-G Ratio 06/01/2022 1.6     WBC 06/01/2022 5.9     RBC 06/01/2022 5.25     Hemoglobin 06/01/2022 16.3     Hematocrit 06/01/2022 48.2     MCV 06/01/2022 91.8     MCH 06/01/2022 31.0     MCHC 06/01/2022 33.8     RDW 06/01/2022 44.6     Platelet Count 06/01/2022 269     MPV 06/01/2022 9.2     Neutrophils-Polys 06/01/2022 64.60     Lymphocytes 06/01/2022 24.70     Monocytes 06/01/2022 6.80     Eosinophils 06/01/2022  2.40     Basophils 06/01/2022 0.80     Immature Granulocytes 06/01/2022 0.70     Nucleated RBC 06/01/2022 0.00     Neutrophils (Absolute) 06/01/2022 3.83     Lymphs (Absolute) 06/01/2022 1.46     Monos (Absolute) 06/01/2022 0.40     Eos (Absolute) 06/01/2022 0.14     Baso (Absolute) 06/01/2022 0.05     Immature Granulocytes (a* 06/01/2022 0.04     NRBC (Absolute) 06/01/2022 0.00     Fasting Status 06/01/2022 Fasting     GFR (CKD-EPI) 06/01/2022 91    Hospital Outpatient Visit on 06/01/2022   Component Date Value    Hsv I-Ii Igg Antibodies 06/01/2022 1.62     Hepatitis B Surface Anti* 06/01/2022 Non-Reactive     Hepatitis B Cors Ab,IgM 06/01/2022 Non-Reactive     Hepatitis A Virus Ab, IgM 06/01/2022 Non-Reactive     Hepatitis C Antibody 06/01/2022 Non-Reactive     HIV Ag/Ab Combo Assay 06/01/2022 Non-Reactive     C. trachomatis by PCR 06/01/2022 Negative     N. gonorrhoeae by PCR 06/01/2022 Negative     Source 06/01/2022 Urine     HSV1 Gly IgG 06/01/2022 1.02 (H)     HSV2 Gly IgG 06/01/2022 0.13    Office Visit on 05/31/2022   Component Date Value    Internal  05/31/2022 Valid     SARS-COV ANTIGEN SILVINO 05/31/2022 Negative           SAFETY ASSESSMENT - RISK TO SELF  Current suicide attempts or self harm: No  Past suicide attempts or self harm: No  History of suicide by family member: No  History of suicide by friend/significant other: No  Recent change in amount/specificity/intensity of suicidal thoughts or self-harm behavior: No  Ongoing substance use disorder: Yes, cannabis use daily  Current access to firearms, medications, or other identified means of suicide/self-harm: No  If yes, willing to restrict access to means of suicide/self-harm: N/a  Protective factors present: Yes     SAFETY ASSESSMENT - RISK TO OTHERS  Current aggressive behavior or risk to others: No  Past aggressive behavior or risk to others: No  Recent change in amount/specificity/intensity of thoughts or threats to harm others?  "No  Current access to firearms/other identified means of harm? No  If yes, willing to restrict access to weapons/means of harm? N/a     CURRENT RISK ASSESSMENT       Suicide: Low       Homicide: Low       Self-Harm: Low       Relapse: Not applicable       Crisis Safety Plan Reviewed Not Indicated    [place]  records   reviewed.  No concerns about misuse of controlled substance.    ASSESSMENT  Ramsey Allen is a 48 y.o. old male who presents today for initial psychiatric evaluation to address Psychiatric Evaluation  He has a complicated history and recently concerned about anxiety, ADHD.  He is distracted by thoughts about \"what if\" something bad happens in a variety of settings.  He feels uncomfortable in crowded spaces, including  [employer] where he works.  He has been using cannabis after work to help relax.  He feels paranoid about technology and feels best when he is out in nature.  His symptoms became worse after he had meningitis several years ago, and then again after he had COVID in the past year.  Due to the complexity of his presentation, we discussed plan for him to return to clinic in two weeks to further discuss symptoms.  In that time, he is encouraged to try replacing cannabis with hydroxyzine to help relax, especially given his history of paranoia.  He agrees with this plan and will return in two weeks.    DIAGNOSES/PLAN  Encounter Diagnosis   Name Primary?    Anxiety      Take hydroxyzine 25-50 mg 1-2 times daily for anxiety.  Return to clinic in 2 weeks.    Medication options, alternatives (including no medications) and medication risks/benefits/side effects were discussed in detail.  The patient was advised to call, message clinician on [facility], or come in to the clinic if symptoms worsen or if questions/issues regarding their medications arise.  The patient verbalized understanding and agreement.    The patient was educated to call 911, call the suicide hotline, or go to the local ER " if having thoughts of suicide or homicide.  The patient verbalized understanding and agreement.   The proposed treatment plan was discussed with the patient who was provided the opportunity to ask questions and make suggestions regarding alternative treatment. Patient verbalized understanding and expressed agreement with the plan.      Return to clinic in 2 weeks or sooner if symptoms worsen.    This appointment was supervised by attending psychiatrist, who agrees with assessment and treatment plan.  See attending attestation for more details.     Sandeep Pickering M.D.

## 2023-10-18 ENCOUNTER — OFFICE VISIT (OUTPATIENT)
Dept: BEHAVIORAL HEALTH | Facility: CLINIC | Age: 48
End: 2023-10-18
Payer: COMMERCIAL

## 2023-10-18 DIAGNOSIS — F41.9 ANXIETY: ICD-10-CM

## 2023-10-18 PROCEDURE — 99214 OFFICE O/P EST MOD 30 MIN: CPT | Performed by: STUDENT IN AN ORGANIZED HEALTH CARE EDUCATION/TRAINING PROGRAM

## 2023-10-18 RX ORDER — HYDROXYZINE HYDROCHLORIDE 25 MG/1
25 TABLET, FILM COATED ORAL 3 TIMES DAILY PRN
Qty: 90 TABLET | Refills: 0 | Status: SHIPPED | OUTPATIENT
Start: 2023-10-18 | End: 2023-11-14 | Stop reason: SINTOL

## 2023-10-20 NOTE — PROGRESS NOTES
Evaluation completed by: Sandeep Pickering M.D.   Date of Service: 10/18/23   Appointment type: in-office appointment.    Information below was collected from: patient    CHIEF COMPLIANT:  Medication Management and Anxiety    HPI:   Ramsey Allen is a 48 y.o. old male who presents today for follow up appointment to address Medication Management and Anxiety    At our last appointment he began taking hydroxyzine to use as needed for anxiety.  He has been taking this in the evenings and finds that it helps with sleep as well.  He has noticed some mornings are difficult due to mental slowness that started when he began taking hydroxyzine.  He had previously been using cannabis to manage anxiety that tended to occur after he returned home from work.  He is able to relax after work using hydroxyzine.  He denies concerns about discontinuing cannabis but did experience some nightmares for the first few days after he stopped.  He is working on finding ways to enjoy his evenings and said watching TV before going to bed.  He typically enjoys hiking and running and being outside.  His physical activity was limited by a previous injury but pain has improved and he would like to resume exercise.  He sometimes goes out with coworkers after work shift and enjoys these evenings.    CURRENT MEDICATIONS    Current Outpatient Medications:     hydrOXYzine HCl (ATARAX) 25 MG Tab, Take 1 Tablet by mouth 3 times a day as needed for Itching for up to 30 days. Indications: Feeling Anxious, Disp: 90 Tablet, Rfl: 0    alfuzosin (UROXATRAL) 10 MG SR tablet, TAKE 1 TABLET BY MOUTH EVERY DAY, Disp: 100 Tablet, Rfl: 0    tadalafil (CIALIS) 20 MG tablet, Take 1 Tablet by mouth as needed for Erectile Dysfunction., Disp: 6 Tablet, Rfl: 11    albuterol 108 (90 Base) MCG/ACT Aero Soln inhalation aerosol, Inhale 2 Puffs every 6 hours as needed for Shortness of Breath., Disp: 8.5 g, Rfl: 0    Psychiatric Review of Systems:current symptoms as  reported by pt.  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, intrusive worrying thoughts in the evening have been improved and are controlled with hydroxyzine  Psychosis: Patient reports no signs or symptoms indicative of psychosis including paranoia  ADHD: Prefers to be active/moving  Tics/Abnormal movements: Limited mobility due to previous injury but improving  Sleep: Improved quality and length of sleep, some drowsiness in the morning  Behavioral/Aggression: Calm  Substance Use: History of daily cannabis use with 2-week history of abstinence    MEDICAL REVIEW OF SYSTEMS   Respiratory: recent inhaler but denies using  Musculoskeletal: Negative for myalgias,  joint pain - injury improved  Skin: Negative for itching and rash.  Neurological: morning sedation/slowness      MEDICAL HISTORY  Past Medical History:   Diagnosis Date    Anxiety     ED (erectile dysfunction)     Hypertension     LBP (low back pain)     Meningitis     Post traumatic stress disorder (PTSD)     Recurrent sinusitis      Allergies:   Allergies   Allergen Reactions    Erythromycin     Ilosone [Erythromycin Estolate]        SURGICAL HISTORY  Past Surgical History:   Procedure Laterality Date    SEPTOTURBINOPLASTY  8/22/08    Performed by LEIDA SOLOMON at [facility]    ANTROSTOMY  8/22/08    Performed by LEIDA SOLOMON at [facility]    SINUSCOPY  8/22/08    Performed by LEIDA SOLOMON at [facility] OR[facility]        FAMILY PSYCHIATRIC HISTORY  Family History   Problem Relation Age of Onset    Diabetes Neg Hx     Heart Disease Neg Hx     Hypertension Neg Hx     Psychiatric Illness Neg Hx        SOCIAL HISTORY  Reviewed with patient and no changes.     SUBSTANCE USE HISTORY  Alcohol:  drinks socially, no excessive use  Tobacco: denies  Cannabis:  previous daily use - stopped  "using two weeks ago, denies cravings and feels better  Denies other substance use    PSYCHIATRIC EXAMINATION   Vitals: There were no vitals taken for this visit.  Musculoskeletal: No abnormal movements noted  Abnormal Movements: none  Gait:within normal limits      General:   - Grooming and hygiene: Casual  - Apparent distress: NAD   - Behavior: Calm  - Eye Contact:  Good  - no psychomotor agitation or retardation   - Participation: Active verbal participation, Attentive, Engaged, and Open to feedback  Orientation:Alert and Fully Oriented to person, place and time  Mood: \"good\"  Affect: Flexible, Full range, Congruent with content, and Congruent to stated mood  Thought Process: Linear, Logical, and Goal-directed  Thought Content: Within normal limits  Perception: Denies auditory or visual hallucinations. No delusions noted Within normal limits  Attention span and concentration: Intact   Speech:Clear with normal rate and rhythm  Language: Appropriate spontaneous, comprehends spoken commands, and fluent  Insight: Good  Judgment:  Good  Recent and remote memory: No gross evidence of memory deficits    LABS:  Hospital Outpatient Visit on 09/06/2023   Component Date Value    Creatinine, Urine 09/06/2023 85.13     Microalbumin, Urine Rand* 09/06/2023 <1.2     Micro Alb Creat Ratio 09/06/2023 see below     Sodium 09/06/2023 139     Potassium 09/06/2023 4.1     Chloride 09/06/2023 105     Co2 09/06/2023 24     Anion Gap 09/06/2023 10.0     Glucose 09/06/2023 101 (H)     Bun 09/06/2023 19     Creatinine 09/06/2023 0.96     Calcium 09/06/2023 9.2     Correct Calcium 09/06/2023 9.1     AST(SGOT) 09/06/2023 29     ALT(SGPT) 09/06/2023 46     Alkaline Phosphatase 09/06/2023 51     Total Bilirubin 09/06/2023 0.7     Albumin 09/06/2023 4.1     Total Protein 09/06/2023 6.4     Globulin 09/06/2023 2.3     A-G Ratio 09/06/2023 1.8     Cholesterol,Tot 09/06/2023 137     Triglycerides 09/06/2023 90     HDL 09/06/2023 39 (A)     LDL " 09/06/2023 80     Fasting Status 09/06/2023 Fasting     GFR (CKD-EPI) 09/06/2023 98    Office Visit on 06/03/2022   Component Date Value    POC Color 06/03/2022 dark yellow     POC Appearance 06/03/2022 sl cloudy     POC Leukocyte Esterase 06/03/2022 neg     POC Nitrites 06/03/2022 neg     POC Urobiligen 06/03/2022 0.2     POC Protein 06/03/2022 neg     POC Urine PH 06/03/2022 7.5     POC Blood 06/03/2022 neg     POC Specific Gravity 06/03/2022 1.020     POC Ketones 06/03/2022 neg     POC Bilirubin 06/03/2022 neg     POC Glucose 06/03/2022 neg    Hospital Outpatient Visit on 06/01/2022   Component Date Value    PSA Total 06/01/2022 1.3     Free Psa 06/01/2022 0.2     % Free Psa 06/01/2022 15     25-Hydroxy   Vitamin D 25 06/01/2022 45     TSH 06/01/2022 1.310     Cholesterol,Tot 06/01/2022 152     Triglycerides 06/01/2022 103     HDL 06/01/2022 36 (A)     LDL 06/01/2022 95     Creatinine, Urine 06/01/2022 147.54     Microalbumin, Urine Rand* 06/01/2022 <1.2     Micro Alb Creat Ratio 06/01/2022 see below     Sodium 06/01/2022 141     Potassium 06/01/2022 4.7     Chloride 06/01/2022 107     Co2 06/01/2022 23     Anion Gap 06/01/2022 11.0     Glucose 06/01/2022 88     Bun 06/01/2022 18     Creatinine 06/01/2022 1.03     Calcium 06/01/2022 9.7     AST(SGOT) 06/01/2022 17     ALT(SGPT) 06/01/2022 19     Alkaline Phosphatase 06/01/2022 42     Total Bilirubin 06/01/2022 0.5     Albumin 06/01/2022 4.4     Total Protein 06/01/2022 7.2     Globulin 06/01/2022 2.8     A-G Ratio 06/01/2022 1.6     WBC 06/01/2022 5.9     RBC 06/01/2022 5.25     Hemoglobin 06/01/2022 16.3     Hematocrit 06/01/2022 48.2     MCV 06/01/2022 91.8     MCH 06/01/2022 31.0     MCHC 06/01/2022 33.8     RDW 06/01/2022 44.6     Platelet Count 06/01/2022 269     MPV 06/01/2022 9.2     Neutrophils-Polys 06/01/2022 64.60     Lymphocytes 06/01/2022 24.70     Monocytes 06/01/2022 6.80     Eosinophils 06/01/2022 2.40     Basophils 06/01/2022 0.80     Immature  Granulocytes 06/01/2022 0.70     Nucleated RBC 06/01/2022 0.00     Neutrophils (Absolute) 06/01/2022 3.83     Lymphs (Absolute) 06/01/2022 1.46     Monos (Absolute) 06/01/2022 0.40     Eos (Absolute) 06/01/2022 0.14     Baso (Absolute) 06/01/2022 0.05     Immature Granulocytes (a* 06/01/2022 0.04     NRBC (Absolute) 06/01/2022 0.00     Fasting Status 06/01/2022 Fasting     GFR (CKD-EPI) 06/01/2022 91    Hospital Outpatient Visit on 06/01/2022   Component Date Value    Hsv I-Ii Igg Antibodies 06/01/2022 1.62     Hepatitis B Surface Anti* 06/01/2022 Non-Reactive     Hepatitis B Cors Ab,IgM 06/01/2022 Non-Reactive     Hepatitis A Virus Ab, IgM 06/01/2022 Non-Reactive     Hepatitis C Antibody 06/01/2022 Non-Reactive     HIV Ag/Ab Combo Assay 06/01/2022 Non-Reactive     C. trachomatis by PCR 06/01/2022 Negative     N. gonorrhoeae by PCR 06/01/2022 Negative     Source 06/01/2022 Urine     HSV1 Gly IgG 06/01/2022 1.02 (H)     HSV2 Gly IgG 06/01/2022 0.13    Office Visit on 05/31/2022   Component Date Value    Internal  05/31/2022 Valid     SARS-COV ANTIGEN SILVINO 05/31/2022 Negative           SAFETY ASSESSMENT - RISK TO SELF  Current suicide attempts or self harm: No  Past suicide attempts or self harm: No     SAFETY ASSESSMENT - RISK TO OTHERS  Current aggressive behavior or risk to others: No  Past aggressive behavior or risk to others: No     CURRENT RISK ASSESSMENT       Suicide: Low       Homicide: Low       Self-Harm: Low       Relapse: Moderate       Crisis Safety Plan Reviewed Not Indicated    [place]  records   reviewed.  No concerns about misuse of controlled substance.    ASSESSMENT  Ramsey Allen is a 48 y.o. old male who presents today for  Follow up  to address Medication Management and Anxiety  He has been taking hydroxyzine 25 mg in the evenings for anxiety and sleep, which has replaced daily cannabis use for the past two weeks.  He denies concerns but feels more tired on some  mornings.  He denies other adverse effects of hydroxyzine.  Recommended that he try taking 0.5 tablet and seeing if his sleep and anxiety continue to be well controlled without making him feel too tired in the morning.  He is planning to start exercising again after recovering from an injury and finds that staying active has been an activity he typically enjoys.  He spends time after work with coworkers on some nights and enjoys these as well.  Discussed possible new hobbies or ways relax during time at home, which has made him feel anxious in the past.    DIAGNOSES/PLAN  1. Anxiety  - hydrOXYzine HCl (ATARAX) 25 MG Tab; Take 0.5 - 1 Tablet by mouth 1 - 3 times a day as needed for Itching for up to 30 days. Indications: Feeling Anxious  Dispense: 90 Tablet; Refill: 0         Medication options, alternatives (including no medications) and medication risks/benefits/side effects were discussed in detail.  The patient was advised to call, message clinician on [employer], or come in to the clinic if symptoms worsen or if questions/issues regarding their medications arise.  The patient verbalized understanding and agreement.    The patient was educated to call 911, call the suicide hotline, or go to the local ER if having thoughts of suicide or homicide.  The patient verbalized understanding and agreement.   The proposed treatment plan was discussed with the patient who was provided the opportunity to ask questions and make suggestions regarding alternative treatment. Patient verbalized understanding and expressed agreement with the plan.      Return to clinic in 6 weeks or sooner if symptoms worsen.    This appointment was supervised by attending psychiatrist, who agrees with assessment and treatment plan.  See attending attestation for more details.     Sandeep Pickering M.D.

## 2023-11-01 ENCOUNTER — OFFICE VISIT (OUTPATIENT)
Dept: URGENT CARE | Facility: PHYSICIAN GROUP | Age: 48
End: 2023-11-01
Payer: COMMERCIAL

## 2023-11-01 ENCOUNTER — HOSPITAL ENCOUNTER (OUTPATIENT)
Facility: MEDICAL CENTER | Age: 48
End: 2023-11-01
Payer: COMMERCIAL

## 2023-11-01 VITALS
RESPIRATION RATE: 16 BRPM | DIASTOLIC BLOOD PRESSURE: 80 MMHG | SYSTOLIC BLOOD PRESSURE: 140 MMHG | HEART RATE: 86 BPM | HEIGHT: 70 IN | TEMPERATURE: 99.3 F | OXYGEN SATURATION: 99 % | WEIGHT: 197 LBS | BODY MASS INDEX: 28.2 KG/M2

## 2023-11-01 DIAGNOSIS — R30.0 DYSURIA: ICD-10-CM

## 2023-11-01 LAB
APPEARANCE UR: CLEAR
BILIRUB UR STRIP-MCNC: NEGATIVE MG/DL
COLOR UR AUTO: YELLOW
GLUCOSE UR STRIP.AUTO-MCNC: NEGATIVE MG/DL
KETONES UR STRIP.AUTO-MCNC: NEGATIVE MG/DL
LEUKOCYTE ESTERASE UR QL STRIP.AUTO: NEGATIVE
NITRITE UR QL STRIP.AUTO: NEGATIVE
PH UR STRIP.AUTO: 7 [PH] (ref 5–8)
PROT UR QL STRIP: NEGATIVE MG/DL
RBC UR QL AUTO: NEGATIVE
SP GR UR STRIP.AUTO: 1.01
UROBILINOGEN UR STRIP-MCNC: 0.2 MG/DL

## 2023-11-01 PROCEDURE — 87086 URINE CULTURE/COLONY COUNT: CPT

## 2023-11-01 PROCEDURE — 3079F DIAST BP 80-89 MM HG: CPT

## 2023-11-01 PROCEDURE — 81002 URINALYSIS NONAUTO W/O SCOPE: CPT

## 2023-11-01 PROCEDURE — 3077F SYST BP >= 140 MM HG: CPT

## 2023-11-01 PROCEDURE — 99213 OFFICE O/P EST LOW 20 MIN: CPT

## 2023-11-01 RX ORDER — SULFAMETHOXAZOLE AND TRIMETHOPRIM 800; 160 MG/1; MG/1
1 TABLET ORAL 2 TIMES DAILY
Qty: 14 TABLET | Refills: 0 | Status: SHIPPED | OUTPATIENT
Start: 2023-11-01 | End: 2023-11-03

## 2023-11-01 ASSESSMENT — ENCOUNTER SYMPTOMS
FEVER: 0
NAUSEA: 0
DIARRHEA: 0
VOMITING: 0
ABDOMINAL PAIN: 0
FLANK PAIN: 0

## 2023-11-01 ASSESSMENT — FIBROSIS 4 INDEX: FIB4 SCORE: 0.73

## 2023-11-01 NOTE — PROGRESS NOTES
"Subjective:   Ramsey Allen is a 48 y.o. male who presents for Other (Has been having pain and irritation on urethra x a few days on and off. States that he tested positive for UTI at home)  Patient presents to urgent care with complaints of irritation to urethra for the last 7 days.  Patient states that he took an at home AZO UTI test which came back positive, prompting him to come to urgent care.  He states that this pain or discomfort does not increase with urination but has been having increased frequency and urgency.  Patient denies any fevers.  Denies any drainage from urethra.  Patient denies any scrotal pain or edema.  Patient states he has had the same sex partner for the last 6 months. Denies history of STDs.      Review of Systems   Constitutional:  Negative for fever.   Gastrointestinal:  Negative for abdominal pain, diarrhea, nausea and vomiting.   Genitourinary:  Positive for frequency and urgency. Negative for dysuria and flank pain.       Medications, Allergies, and current problem list reviewed today in Epic.     Objective:     BP (!) 140/80 (BP Location: Right arm, Patient Position: Sitting, BP Cuff Size: Adult long)   Pulse 86   Temp 37.4 °C (99.3 °F) (Temporal)   Resp 16   Ht 1.778 m (5' 10\")   Wt 89.4 kg (197 lb)   SpO2 99%     Physical Exam  Constitutional:       Appearance: Normal appearance.   Cardiovascular:      Rate and Rhythm: Normal rate and regular rhythm.   Pulmonary:      Effort: Pulmonary effort is normal.      Breath sounds: Normal breath sounds.   Neurological:      Mental Status: He is alert.       Lab Results/POC Test Results   Results for orders placed or performed in visit on 11/01/23   POCT Urinalysis   Result Value Ref Range    POC Color Yellow Negative    POC Appearance Clear Negative    POC Glucose Negative Negative mg/dL    POC Bilirubin Negative Negative mg/dL    POC Ketones Negative Negative mg/dL    POC Specific Gravity 1.010 <1.005 - >1.030    POC Blood " Negative Negative    POC Urine PH 7.0 5.0 - 8.0    POC Protein Negative Negative mg/dL    POC Urobiligen 0.2 Negative (0.2) mg/dL    POC Nitrites Negative Negative    POC Leukocyte Esterase Negative Negative          Assessment/Plan:     Diagnosis and associated orders:     1. Dysuria  sulfamethoxazole-trimethoprim (BACTRIM DS) 800-160 MG tablet    URINE CULTURE(NEW)    POCT Urinalysis         Comments/MDM:     Patient present to UC today with c/o increased urgency and irritation to urethra. He reports taking an at home UTI test, which came back positive. Redness and irritation to urethral opening. No scrotal swelling or pain. No discharge appreciated.  Vital signs are stable. Educated on elevated BP and discussed follow up with PCP.   Given symptoms and findings from at home test will treat with antibiotic for potential UTI. Urine sent for culture and further evaluation.   Discussed differential diagnosis for sexually transmitted diseases. Patient declines STD testing at this time.   Instructed to return to ER or urgent care if symptoms worsen or fail to improve.         Differential diagnosis, natural history, supportive care, and indications for immediate follow-up discussed.    Advised the patient to follow-up with the primary care physician for recheck, reevaluation, and consideration of further management.    Please note that this dictation was created using voice recognition software. I have made a reasonable attempt to correct obvious errors, but I expect that there are errors of grammar and possibly content that I did not discover before finalizing the note.    This note was electronically signed by SUDHIR Landa

## 2023-11-03 ENCOUNTER — OFFICE VISIT (OUTPATIENT)
Dept: URGENT CARE | Facility: PHYSICIAN GROUP | Age: 48
End: 2023-11-03
Payer: COMMERCIAL

## 2023-11-03 ENCOUNTER — HOSPITAL ENCOUNTER (OUTPATIENT)
Facility: MEDICAL CENTER | Age: 48
End: 2023-11-03
Attending: PHYSICIAN ASSISTANT
Payer: COMMERCIAL

## 2023-11-03 VITALS
TEMPERATURE: 98.4 F | HEART RATE: 78 BPM | OXYGEN SATURATION: 98 % | DIASTOLIC BLOOD PRESSURE: 80 MMHG | BODY MASS INDEX: 28.87 KG/M2 | HEIGHT: 69 IN | WEIGHT: 194.89 LBS | SYSTOLIC BLOOD PRESSURE: 138 MMHG

## 2023-11-03 DIAGNOSIS — N34.2 URETHRITIS: ICD-10-CM

## 2023-11-03 DIAGNOSIS — N48.1 BALANITIS: ICD-10-CM

## 2023-11-03 LAB
BACTERIA UR CULT: NORMAL
SIGNIFICANT IND 70042: NORMAL
SITE SITE: NORMAL
SOURCE SOURCE: NORMAL

## 2023-11-03 PROCEDURE — 99213 OFFICE O/P EST LOW 20 MIN: CPT | Performed by: PHYSICIAN ASSISTANT

## 2023-11-03 PROCEDURE — 87491 CHLMYD TRACH DNA AMP PROBE: CPT

## 2023-11-03 PROCEDURE — 3075F SYST BP GE 130 - 139MM HG: CPT | Performed by: PHYSICIAN ASSISTANT

## 2023-11-03 PROCEDURE — 87591 N.GONORRHOEAE DNA AMP PROB: CPT

## 2023-11-03 PROCEDURE — 3079F DIAST BP 80-89 MM HG: CPT | Performed by: PHYSICIAN ASSISTANT

## 2023-11-03 RX ORDER — BETAMETHASONE DIPROPIONATE 0.5 MG/G
1 CREAM TOPICAL 2 TIMES DAILY
Qty: 45 G | Refills: 0 | Status: SHIPPED | OUTPATIENT
Start: 2023-11-03

## 2023-11-03 ASSESSMENT — ENCOUNTER SYMPTOMS
CARDIOVASCULAR NEGATIVE: 1
NAUSEA: 0
VOMITING: 0
FLANK PAIN: 0
DIARRHEA: 0
ABDOMINAL PAIN: 0
RESPIRATORY NEGATIVE: 1
NERVOUS/ANXIOUS: 1
CONSTITUTIONAL NEGATIVE: 1

## 2023-11-03 ASSESSMENT — FIBROSIS 4 INDEX: FIB4 SCORE: 0.73

## 2023-11-03 NOTE — PROGRESS NOTES
"  Subjective:     Ramsey Allen  is a 48 y.o. male who presents for Penis Pain (Pain, tense, redness, sore; on Wednesday came back still has the pain, took a urine cx, BV got results they were negative, he was it to get it looked at)       Please recall he was seen in urgent care on 11/1/2023 for similar symptoms    He presents today with ongoing urethritis symptoms over the past several days.  During his visit on 11/1/2023 he did have a urine culture obtained that was negative for bacterial growth, he was started on Bactrim for suspected UTI during that office visit.  States that the urethritis and irritation symptoms are just at the tip of the penis.  Denies any testicular pain or swelling.  No penile drainage or discharge.  Denies any recent new sexual partners within the last 6 months.  No drainage or discharge from the penis.    He does note that he occasionally gets areas of erythema and \"waxy texture/color\" around the base of the glans penis underneath his circumcision foreskin.  These areas of erythema are nonpainful and nonpruritic in nature.       Review of Systems   Constitutional: Negative.    Respiratory: Negative.     Cardiovascular: Negative.    Gastrointestinal:  Negative for abdominal pain, diarrhea, nausea and vomiting.   Genitourinary:  Negative for dysuria, flank pain, frequency, hematuria and urgency.        Urethral discomfort at the tip of the penis.  Redness at the base of the glans penis   Psychiatric/Behavioral:  The patient is nervous/anxious.       Allergies   Allergen Reactions    Erythromycin     Ilosone [Erythromycin Estolate]      Past Medical History:   Diagnosis Date    Anxiety     ED (erectile dysfunction)     Hypertension     LBP (low back pain)     Meningitis     Post traumatic stress disorder (PTSD)     Recurrent sinusitis         Objective:   /80 (BP Location: Left arm, Patient Position: Sitting, BP Cuff Size: Adult)   Pulse 78   Temp 36.9 °C (98.4 °F) " "(Temporal)   Ht 1.753 m (5' 9\") Comment: patient reported  Wt 88.4 kg (194 lb 14.2 oz)   SpO2 98%   BMI 28.78 kg/m²   Physical Exam  Vitals and nursing note reviewed.   Constitutional:       General: He is not in acute distress.     Appearance: He is not ill-appearing or toxic-appearing.   HENT:      Head: Normocephalic.      Nose: No rhinorrhea.   Eyes:      General: No scleral icterus.     Conjunctiva/sclera: Conjunctivae normal.   Pulmonary:      Effort: Pulmonary effort is normal. No respiratory distress.      Breath sounds: No stridor.   Genitourinary:     Comments: Circumcised penis.  His examination of the urethral meatus is negative for erythema or swelling.  No drainage or discharge from the penis.  At the base of the glans penis underneath the circumcised foreskin there is evidence of balanitis and areas of erythema.  Musculoskeletal:      Cervical back: Neck supple.   Neurological:      Mental Status: He is alert and oriented to person, place, and time.   Psychiatric:         Mood and Affect: Mood is anxious.         Behavior: Behavior normal.         Thought Content: Thought content normal.         Judgment: Judgment normal.             Diagnostic testing:    Urine gonorrhea/chlamydia-pending    Assessment/Plan:     Encounter Diagnoses   Name Primary?    Urethritis     Balanitis           Plan for care for today's complaint includes having the patient discontinue Bactrim as his urine culture was negative for UTI.  Discussed with the patient that due to his ongoing urethritis symptoms we cannot fully rule out gonorrhea or chlamydia infection, after discussion patient did elect to obtain gonorrhea/chlamydia testing today.  Will contact the patient via [employer] message to discuss the results of the testing obtained today, will adjust treatment plan accordingly.  Patient is also experiencing balanitis at the base of the glans penis, betamethasone cream was prescribed for this condition.  Discussed with " patient that he should start using anti-inflammatory medications for his urethritis symptoms.  If the gonorrhea/chlamydia test is negative and he does not receive improvement with anti-inflammatory medications then we will likely refer to urology for further evaluation of his ongoing urethral symptoms.  Continue to monitor symptoms and return to urgent care or follow-up with primary care provider if symptoms remain ongoing.  Follow-up in the emergency department if symptoms become severe, ER precautions discussed in office today..  Prescription for betamethasone cream provided.    See AVS Instructions below for written guidance provided to patient on after-visit management and care in addition to our verbal discussion during the visit.    Please note that this dictation was created using voice recognition software. I have attempted to correct all errors, but there may be sound-alike, spelling, grammar and possibly content errors that I did not discover before finalizing the note.    Morgan Santizo PA-C

## 2023-11-04 DIAGNOSIS — N34.2 URETHRITIS: ICD-10-CM

## 2023-11-07 ENCOUNTER — APPOINTMENT (OUTPATIENT)
Dept: MEDICAL GROUP | Facility: MEDICAL CENTER | Age: 48
End: 2023-11-07
Payer: COMMERCIAL

## 2023-11-08 ENCOUNTER — APPOINTMENT (OUTPATIENT)
Dept: MEDICAL GROUP | Facility: MEDICAL CENTER | Age: 48
End: 2023-11-08
Payer: COMMERCIAL

## 2023-11-13 ENCOUNTER — APPOINTMENT (OUTPATIENT)
Dept: MEDICAL GROUP | Facility: MEDICAL CENTER | Age: 48
End: 2023-11-13
Payer: COMMERCIAL

## 2023-11-14 ENCOUNTER — OFFICE VISIT (OUTPATIENT)
Dept: BEHAVIORAL HEALTH | Facility: CLINIC | Age: 48
End: 2023-11-14
Payer: COMMERCIAL

## 2023-11-14 DIAGNOSIS — F43.22 REACTION, ADJUSTMENT, WITH ANXIOUS MOOD: ICD-10-CM

## 2023-11-14 DIAGNOSIS — Z65.8 PSYCHOSOCIAL STRESSORS: ICD-10-CM

## 2023-11-14 DIAGNOSIS — N34.3 URETHRAL SYNDROME, UNSPECIFIED: ICD-10-CM

## 2023-11-14 DIAGNOSIS — F41.1 GAD (GENERALIZED ANXIETY DISORDER): ICD-10-CM

## 2023-11-14 PROCEDURE — 99214 OFFICE O/P EST MOD 30 MIN: CPT | Performed by: STUDENT IN AN ORGANIZED HEALTH CARE EDUCATION/TRAINING PROGRAM

## 2023-11-15 ENCOUNTER — OFFICE VISIT (OUTPATIENT)
Dept: MEDICAL GROUP | Facility: MEDICAL CENTER | Age: 48
End: 2023-11-15
Payer: COMMERCIAL

## 2023-11-15 VITALS
OXYGEN SATURATION: 97 % | WEIGHT: 202 LBS | SYSTOLIC BLOOD PRESSURE: 150 MMHG | HEART RATE: 83 BPM | TEMPERATURE: 98.6 F | RESPIRATION RATE: 17 BRPM | HEIGHT: 70 IN | DIASTOLIC BLOOD PRESSURE: 84 MMHG | BODY MASS INDEX: 28.92 KG/M2

## 2023-11-15 DIAGNOSIS — F41.1 GENERALIZED ANXIETY DISORDER: ICD-10-CM

## 2023-11-15 DIAGNOSIS — F51.01 PRIMARY INSOMNIA: ICD-10-CM

## 2023-11-15 DIAGNOSIS — B37.49 CANDIDAL BALANO-POSTHITIS: ICD-10-CM

## 2023-11-15 DIAGNOSIS — T50.905A MEDICATION REACTION, INITIAL ENCOUNTER: ICD-10-CM

## 2023-11-15 PROBLEM — Z65.8 PSYCHOSOCIAL STRESSORS: Status: ACTIVE | Noted: 2023-11-15

## 2023-11-15 PROBLEM — F43.22: Status: ACTIVE | Noted: 2023-11-15

## 2023-11-15 PROCEDURE — 3077F SYST BP >= 140 MM HG: CPT | Performed by: NURSE PRACTITIONER

## 2023-11-15 PROCEDURE — 99214 OFFICE O/P EST MOD 30 MIN: CPT | Performed by: NURSE PRACTITIONER

## 2023-11-15 PROCEDURE — 3079F DIAST BP 80-89 MM HG: CPT | Performed by: NURSE PRACTITIONER

## 2023-11-15 RX ORDER — CLOTRIMAZOLE 1 %
1 CREAM (GRAM) TOPICAL 2 TIMES DAILY
Qty: 40 G | Refills: 0 | Status: SHIPPED | OUTPATIENT
Start: 2023-11-15

## 2023-11-15 ASSESSMENT — FIBROSIS 4 INDEX: FIB4 SCORE: 0.73

## 2023-11-15 NOTE — PROGRESS NOTES
Evaluation completed by: Sandeep Pickering M.D.   Date of Service: 11/14/2023   Appointment type: in-office appointment.    Information below was collected from: patient      CHIEF COMPLIANT:  Medication Management        HPI:   Ramsey Allen is a 48 y.o. old male who presents today for follow up appointment to address Medication Management  He has been taking hydroxyzine at bedtime to help with insomnia.  This was started as a way to help relax and fall asleep as a substitute for cannabis.  Patient has not used cannabis in six weeks and has been able to fall asleep with hydroxyzine.  He has recently had increased stress and anxiety in response to a series of stressors.  Several weeks ago his parents had COVID.  Currently, he is significantly distressed due to a recent medical problem that has not been diagnosed definitively and the etiology is currently unknown.  Symptoms include urethral pain and urgency.  Recently a generalized rash has started to appear on his body as well.  He has been to urgent care twice to have this assessed, and STIs and UTI have been ruled out.  He believes this began around 3-4 weeks ago.  There has been some improvement since starting a topical steroid.  He also stopped taking hydroxyzine 2 days ago and has had mild improvement over the last two days.  Pain is limiting daily activities.  He has an appointment with his PCP this week.  He has been ruminating on what might be wrong.    CURRENT MEDICATIONS    Current Outpatient Medications:     betamethasone dipropionate 0.05 % Cream, Apply 1 Application topically 2 times a day., Disp: 45 g, Rfl: 0    alfuzosin (UROXATRAL) 10 MG SR tablet, TAKE 1 TABLET BY MOUTH EVERY DAY, Disp: 100 Tablet, Rfl: 0    tadalafil (CIALIS) 20 MG tablet, Take 1 Tablet by mouth as needed for Erectile Dysfunction., Disp: 6 Tablet, Rfl: 11    albuterol 108 (90 Base) MCG/ACT Aero Soln inhalation aerosol, Inhale 2 Puffs every 6 hours as needed for Shortness of  Breath., Disp: 8.5 g, Rfl: 0    Psychiatric Review of Systems:current symptoms as reported by pt.  Depression: denies low mood  Miguelina: denies elevated mood  Anxiety/Panic Attacks: insomnia, racing thoughts, psychomotor agitation, feelings of losing control, difficulty concentrating, specifically related to recent health concerns and recently also related to family members with COVID  Sleep: insomnia - improvement while taking hydroxyzine qhs  Behavioral/Aggression: Tense  Substance Use: has not been using cannabis - last use approximately 6 weeks ago    MEDICAL REVIEW OF SYSTEMS   Genitourinary: recent dysuria, urgency, urethral pain  Skin:  generalized rash  Otherwise negative      MEDICAL HISTORY  Past Medical History:   Diagnosis Date    Anxiety     ED (erectile dysfunction)     Hypertension     LBP (low back pain)     Meningitis     Post traumatic stress disorder (PTSD)     Recurrent sinusitis      Allergies:   Allergies   Allergen Reactions    Erythromycin     Ilosone [Erythromycin Estolate]      @pre    SURGICAL HISTORY  Past Surgical History:   Procedure Laterality Date    SEPTOTURBINOPLASTY  8/22/08    Performed by LEIDA SOLOMON at SUR[facility]ERY [facility]    ANTROSTOMY  8/22/08    Performed by LEIDA SOLOMON at [facility]    SINUSCOPY  8/22/08    Performed by LEIDA SOLOMON at [facility]        FAMILY PSYCHIATRIC HISTORY  Family History   Problem Relation Age of Onset    Diabetes Neg Hx     Heart Disease Neg Hx     Hypertension Neg Hx     Psychiatric Illness Neg Hx        SOCIAL HISTORY  Reviewed with patient and no changes.     SUBSTANCE USE HISTORY  Alcohol: Patient denies the use of alcohol  Tobacco: Patient denies the use of tobacco products  Cannabis: denies use of marijuana products and stopped completely 6 weeks ago  Opioids: denies  Other prescription medications: denies  Other: denies    PSYCHIATRIC EXAMINATION   Vitals: There were no vitals taken for  "this visit.  Musculoskeletal: No abnormal movements noted  Abnormal Movements: none  Gait:within normal limits      General:   - Grooming and hygiene: Casual  - Apparent distress: NAD   - Behavior: Tense  - Eye Contact:  Good  - no psychomotor agitation or retardation   - Participation: Active verbal participation, Attentive, Engaged, and Open to feedback  Orientation:Alert and Fully Oriented to person, place and time  Mood: \"anxious\"  Affect: Flexible, Full range, Congruent with content, Congruent to stated mood, and Anxious  Thought Process: Linear, Logical, and Goal-directed  Thought Content: Within normal limits  Perception: Denies auditory or visual hallucinations. No delusions noted Within normal limits  Attention span and concentration: Intact   Speech:Clear with normal rate and rhythm  Language: Appropriate spontaneous, comprehends spoken commands, and fluent  Insight: Good  Judgment:  Good  Recent and remote memory: No gross evidence of memory deficits    LABS:     Component      Latest Ref Rng 11/1/2023 11/3/2023   POC Color      Negative  Yellow     POC Appearance      Negative  Clear     POC Glucose      Negative mg/dL Negative     POC Bilirubin      Negative mg/dL Negative     POC Ketones      Negative mg/dL Negative     POC Specific Gravity      <1.005 - >1.030  1.010     POC Blood      Negative  Negative     POC Urine PH      5.0 - 8.0  7.0     POC Protein      Negative mg/dL Negative     POC Urobiligen      Negative (0.2) mg/dL 0.2     POC Nitrites      Negative  Negative     POC Leukocyte Esterase      Negative  Negative     Significant Indicator NEG     Source UR     Site -     Culture Result No growth at 48 hours.     C. trachomatis by PCR      Negative   Negative    Gc By Dna Probe      Negative   Negative    Source  Urine          CURRENT RISK ASSESSMENT       Suicide: Low       Homicide: Low       Self-Harm: Low       Relapse: Low       Crisis Safety Plan Reviewed Not Indicated    [place]  " records   reviewed.  No concerns about misuse of controlled substance.    ASSESSMENT  Ramsey Allen is a 48 y.o. old male who presents today for follow up appointment to address Medication Management  He has had increased anxiety due to recent medical problem without known cause.  PCP appointment this week.  Worrying/ruminating about possible problems.  Pain/discomfort limiting activities.  Has been taking hydroxyzine as a substitute for cannabis to help with sleep.  Has not used cannabis in six weeks.  Due to reported rash as a rare adverse effect of hydroxyzine, plan to stop this medication.  Patient has had anxiety due to stressors but would like to try managing without medications now that cannabis abstinence has been stable.  He has had negative experiences in the past taking SSRIs and daily medications for anxiety.  Would like to use exercise as a way to cope but has been limited by pain currently.  Discussed using melatonin if insomnia persists after discontinuing hydroxyzine.      DIAGNOSES/PLAN  Encounter Diagnoses   Name Primary?    TULIO (generalized anxiety disorder)     Urethral syndrome, unspecified     Reaction, adjustment, with anxious mood     Psychosocial stressors           -Stop hydroxyzine due to possible adverse effect  -If insomnia persists, take melatonin 1 mg po qhs for sleep    Medication options, alternatives (including no medications) and medication risks/benefits/side effects were discussed in detail.  The patient was advised to call, message clinician on [facility], or come in to the clinic if symptoms worsen or if questions/issues regarding their medications arise.  The patient verbalized understanding and agreement.    The patient was educated to call 911, call the suicide hotline, or go to the local ER if having thoughts of suicide or homicide.  The patient verbalized understanding and agreement.   The proposed treatment plan was discussed with the patient who was provided  the opportunity to ask questions and make suggestions regarding alternative treatment. Patient verbalized understanding and expressed agreement with the plan.      Return to clinic in 4 weeks or sooner if symptoms worsen.    This appointment was supervised by attending psychiatrist, who agrees with assessment and treatment plan.  See attending attestation for more details.     Sandeep Pickering M.D.

## 2023-11-15 NOTE — PROGRESS NOTES
Subjective:     Ramsey Allen is a 48 y.o. male who presents with anxiety.    HPI:   Seen in f/u for anxiety.  He has been seeing a psychiatrist.  They think that his anxiety is r/t sleep apnea.  Pt has had CPAP but he never marianela it. They told him that if he didn't stop eating unhealthy, smooking THC nitely that he would get worse.  Pt has stopped using THC.  He was given hydroxyzine to take when he gets home.  Since then he is also eating better.  He is now sleeping better.  Sometimes sleeping all nite.  Then 3 wks ago he started having urinary freq.  Also had constant stinging pain just inside the urethra.  Pain did get worse for few days but then got better.  He went to .   He has been told that he has balanthitis. He was having more sx with intercourse.  Has been occurring intermittently.  Several days before sx got worse he went for a long hike.  He was also having dehydration sx.   In UC he was tested for UTI.  He was tx prophylactically w/bactrim.  UA/CX was negative.  He still had several sites on his urethra that continued to be worse.  Cx for gc/chlamydia.  They finally decided that it his jpain and the urethra sites were d/t balanthitis.  He was treated with topical sterod & antibx.    He continues to use the topical med bid.  It is betamethasone.  He still has some of the spots but they are getting better.  There is still some irritation. UC provider told him to f/u in 2 wks if sx persist.  He has now having skin irritation.  Also having neck and shoulder pain. The neck and shoulder pain has resolved.    Yesterday he saw psychiatrist. He told him all that has been going on.  Psych thought that it was the hydroxyzine was causing the s/e.      Patient Active Problem List    Diagnosis Date Noted    Reaction, adjustment, with anxious mood 11/15/2023    Psychosocial stressors 11/15/2023    Urethral syndrome, unspecified 11/14/2023    Obesity (BMI 30-39.9) 12/14/2016    Left-sided tinnitus  "11/11/2016    Vitamin d deficiency 03/20/2012    Preventative health care 09/21/2011    Recurrent sinusitis     Post traumatic stress disorder (PTSD)     Low back pain 07/08/2010    HTN (hypertension) 04/28/2010    Anxiety 10/21/2009    Erectile dysfunction 08/04/2009       Current medicines (including changes today)  Current Outpatient Medications   Medication Sig Dispense Refill    clotrimazole (LOTRIMIN) 1 % Cream Apply 1 Application topically 2 times a day. 40 g 0    betamethasone dipropionate 0.05 % Cream Apply 1 Application topically 2 times a day. 45 g 0    alfuzosin (UROXATRAL) 10 MG SR tablet TAKE 1 TABLET BY MOUTH EVERY  Tablet 0    tadalafil (CIALIS) 20 MG tablet Take 1 Tablet by mouth as needed for Erectile Dysfunction. 6 Tablet 11    albuterol 108 (90 Base) MCG/ACT Aero Soln inhalation aerosol Inhale 2 Puffs every 6 hours as needed for Shortness of Breath. 8.5 g 0     No current facility-administered medications for this visit.       Allergies   Allergen Reactions    Erythromycin     Ilosone [Erythromycin Estolate]        ROS  Constitutional: Negative. Negative for fever, chills, weight loss, malaise/fatigue and diaphoresis.   HENT: Negative. Negative for hearing loss, ear pain, nosebleeds, congestion, sore throat, neck pain, tinnitus and ear discharge.   Respiratory: Negative. Negative for cough, hemoptysis, sputum production, shortness of breath, wheezing and stridor.   Cardiovascular: Negative. Negative for chest pain, palpitations, orthopnea, claudication, leg swelling and PND.   Gastrointestinal: Denies nausea, vomiting, diarrhea, constipation, heartburn, melena or hematochezia.  Genitourinary: Denies dysuria, hematuria, urinary incontinence, frequency or urgency.        Objective:     BP (!) 150/84 (BP Location: Right arm, Patient Position: Sitting, BP Cuff Size: Large adult)   Pulse 83   Temp 37 °C (98.6 °F) (Temporal)   Resp 17   Ht 1.778 m (5' 10\")   Wt 91.6 kg (202 lb)   SpO2 97%  " Body mass index is 28.98 kg/m².    Physical Exam:  Vitals reviewed.  Constitutional: Oriented to person, place, and time. appears well-developed and well-nourished. No distress.   Cardiovascular: Normal rate, regular rhythm, normal heart sounds and intact distal pulses. Exam reveals no gallop and no friction rub. No murmur heard. No carotid bruits.   Pulmonary/Chest: Effort normal and breath sounds normal. No stridor. No respiratory distress. no wheezes or rales. exhibits no tenderness.   Musculoskeletal: Normal range of motion. exhibits no edema. otoniel pedal pulses 2+.  Lymphadenopathy: No cervical or supraclavicular adenopathy.   Neurological: Alert and oriented to person, place, and time. exhibits normal muscle tone.  Skin: Skin is warm and dry. No diaphoresis.   Psychiatric: Normal mood and affect. Behavior is normal.      Assessment and Plan:     The following treatment plan was discussed:    1. Candidal balano-posthitis  clotrimazole (LOTRIMIN) 1 % Cream    Referral to Urology    complete 2 wk course of steroid crm, then try lotrimin cr. refer urology for further tx      2. Generalized anxiety disorder      continue f/u with psychiatrist. sx not well controlled now with balanthitis. off hydroxyzine d/t s/e.      3. Primary insomnia      was sleeping well off thc & unhealthy diet. dc'd hydroxyzine d/t s/e. f/u with psych      4. Medication reaction, initial encounter      psychiatry thinks neck & back pain and itching r/t med s/e. stopped hydroxyzine.            Followup: Return in about 4 weeks (around 12/13/2023), or for lab review.

## 2023-11-16 ENCOUNTER — APPOINTMENT (OUTPATIENT)
Dept: MEDICAL GROUP | Facility: MEDICAL CENTER | Age: 48
End: 2023-11-16
Payer: COMMERCIAL

## 2023-11-28 ENCOUNTER — APPOINTMENT (OUTPATIENT)
Dept: MEDICAL GROUP | Facility: MEDICAL CENTER | Age: 48
End: 2023-11-28
Payer: COMMERCIAL

## 2023-12-18 ENCOUNTER — APPOINTMENT (OUTPATIENT)
Dept: BEHAVIORAL HEALTH | Facility: CLINIC | Age: 48
End: 2023-12-18
Payer: COMMERCIAL

## 2024-01-14 DIAGNOSIS — N52.9 ERECTILE DYSFUNCTION, UNSPECIFIED ERECTILE DYSFUNCTION TYPE: ICD-10-CM

## 2024-01-14 RX ORDER — ALFUZOSIN HYDROCHLORIDE 10 MG/1
10 TABLET, EXTENDED RELEASE ORAL DAILY
Qty: 90 TABLET | Refills: 1 | Status: SHIPPED | OUTPATIENT
Start: 2024-01-14

## 2024-02-18 ENCOUNTER — HOSPITAL ENCOUNTER (EMERGENCY)
Facility: MEDICAL CENTER | Age: 49
End: 2024-02-18
Attending: STUDENT IN AN ORGANIZED HEALTH CARE EDUCATION/TRAINING PROGRAM
Payer: COMMERCIAL

## 2024-02-18 VITALS
OXYGEN SATURATION: 97 % | BODY MASS INDEX: 29.76 KG/M2 | DIASTOLIC BLOOD PRESSURE: 99 MMHG | HEIGHT: 70 IN | WEIGHT: 207.89 LBS | TEMPERATURE: 96.7 F | RESPIRATION RATE: 16 BRPM | SYSTOLIC BLOOD PRESSURE: 169 MMHG | HEART RATE: 80 BPM

## 2024-02-18 DIAGNOSIS — R51.9 NONINTRACTABLE EPISODIC HEADACHE, UNSPECIFIED HEADACHE TYPE: ICD-10-CM

## 2024-02-18 PROCEDURE — 96374 THER/PROPH/DIAG INJ IV PUSH: CPT

## 2024-02-18 PROCEDURE — 99284 EMERGENCY DEPT VISIT MOD MDM: CPT

## 2024-02-18 PROCEDURE — A9270 NON-COVERED ITEM OR SERVICE: HCPCS | Performed by: STUDENT IN AN ORGANIZED HEALTH CARE EDUCATION/TRAINING PROGRAM

## 2024-02-18 PROCEDURE — 96375 TX/PRO/DX INJ NEW DRUG ADDON: CPT

## 2024-02-18 PROCEDURE — 700111 HCHG RX REV CODE 636 W/ 250 OVERRIDE (IP): Performed by: STUDENT IN AN ORGANIZED HEALTH CARE EDUCATION/TRAINING PROGRAM

## 2024-02-18 PROCEDURE — 700102 HCHG RX REV CODE 250 W/ 637 OVERRIDE(OP): Performed by: STUDENT IN AN ORGANIZED HEALTH CARE EDUCATION/TRAINING PROGRAM

## 2024-02-18 RX ORDER — DIPHENHYDRAMINE HYDROCHLORIDE 50 MG/ML
25 INJECTION INTRAMUSCULAR; INTRAVENOUS ONCE
Status: COMPLETED | OUTPATIENT
Start: 2024-02-18 | End: 2024-02-18

## 2024-02-18 RX ORDER — ACETAMINOPHEN 500 MG
1000 TABLET ORAL ONCE
Status: COMPLETED | OUTPATIENT
Start: 2024-02-18 | End: 2024-02-18

## 2024-02-18 RX ORDER — PROCHLORPERAZINE EDISYLATE 5 MG/ML
10 INJECTION INTRAMUSCULAR; INTRAVENOUS ONCE
Status: COMPLETED | OUTPATIENT
Start: 2024-02-18 | End: 2024-02-18

## 2024-02-18 RX ADMIN — DIPHENHYDRAMINE HYDROCHLORIDE 25 MG: 50 INJECTION, SOLUTION INTRAMUSCULAR; INTRAVENOUS at 22:37

## 2024-02-18 RX ADMIN — PROCHLORPERAZINE EDISYLATE 10 MG: 5 INJECTION INTRAMUSCULAR; INTRAVENOUS at 22:33

## 2024-02-18 RX ADMIN — ACETAMINOPHEN 1000 MG: 500 TABLET ORAL at 22:36

## 2024-02-18 ASSESSMENT — FIBROSIS 4 INDEX: FIB4 SCORE: 0.73

## 2024-02-19 NOTE — DISCHARGE INSTRUCTIONS
You were seen in the emergency department after headache.  Your headache improved after treatment with medication.  Please monitor your symptoms, if you develop worsening neurologic symptoms, numbness weakness tingling in your arms or legs, facial droop, difficulty swallowing or speaking, come back to the emergency department immediately for reevaluation.

## 2024-02-19 NOTE — ED TRIAGE NOTES
Ramsey Gino Alejandro  48 y.o.  male  Chief Complaint   Patient presents with    Headache     Pt woke up with bad headache today behind his L eye. Mild relief with tylenol & coffee, but then worsening this afternoon throbbing + pressure. No neuro deficits, no fevers or cough/cold sx. Pt took his BP tonight & noted it was high, 180s systolic. Hx hypertension, currently not on medications per PCP.      Pt is currently on doxycycline for a prostate infection, prescribed by his urologist last week. Pt reports sx improving.     Patient educated on triage process, to alert staff if any changes in condition.

## 2024-02-19 NOTE — ED PROVIDER NOTES
ED Provider Note    CHIEF COMPLAINT  Chief Complaint   Patient presents with    Headache     Pt woke up with bad headache today behind his L eye. Mild relief with tylenol & coffee, but then worsening this afternoon throbbing + pressure. No neuro deficits, no fevers or cough/cold sx. Pt took his BP tonight & noted it was high, 180s systolic. Hx hypertension, currently not on medications per PCP.        EXTERNAL RECORDS REVIEWED  Reviewed MRI of the brain obtained in 2017    HPI/ROS  LIMITATION TO HISTORY   Select: : None  OUTSIDE HISTORIAN(S):  Girlfriend providing clinically relevant collateral history    Ramsey Allen is a 48 y.o. male with past medical history of anxiety, hypertension, posttraumatic stress disorder presenting to the emergency department for a headache.  Describes left-sided headache behind the left globe, not the worst headache of his life.  Says that it started when he woke up this morning, not maximal severity at onset.  He took Tylenol with each helped with his pain but headache came back several hours later.  Says that he took Tylenol again and then took his blood pressure and blood pressure was elevated in the 180s, subsequently went to a pharmacy and took his blood pressure again and blood pressure was in the 200s so he went to urgent care.  Urgent care sent him to the emergency department for evaluation.    Says that this is similar headache to 1 which he gets approximately once every 1 to 2 months.  No numbness weakness tingling in the arms or legs, facial droop, difficulty speaking or swallowing.  No new neurologic deficits.    Says that he is currently being treated for prostatitis by his urologist, he is on doxycycline and says that he thinks that the headache might be attributed to the doxycycline.    PAST MEDICAL HISTORY   has a past medical history of Anxiety, ED (erectile dysfunction), Hypertension, LBP (low back pain), Meningitis, Post traumatic stress disorder (PTSD),  "and Recurrent sinusitis.    SURGICAL HISTORY   has a past surgical history that includes septoturbinoplasty (08); antrostomy (08); and sinuscopy (08).    FAMILY HISTORY  Family History   Problem Relation Age of Onset    Diabetes Neg Hx     Heart Disease Neg Hx     Hypertension Neg Hx     Psychiatric Illness Neg Hx        SOCIAL HISTORY  Social History     Tobacco Use    Smoking status: Former     Current packs/day: 0.00     Types: Cigarettes     Start date: 2000     Quit date: 2010     Years since quittin.6    Smokeless tobacco: Former     Types: Chew    Tobacco comments:     occationally  quit chewing 11 years ago   Vaping Use    Vaping Use: Never used   Substance and Sexual Activity    Alcohol use: Not Currently     Comment: socially    Drug use: Not Currently     Types: Marijuana     Comment: occ    Sexual activity: Not on file       CURRENT MEDICATIONS  Home Medications       Reviewed by Devorah Vines R.N. (Registered Nurse) on 24 at 2004  Med List Status: Not Addressed     Medication Last Dose Status   albuterol 108 (90 Base) MCG/ACT Aero Soln inhalation aerosol  Active   alfuzosin (UROXATRAL) 10 MG SR tablet  Active   betamethasone dipropionate 0.05 % Cream  Active   clotrimazole (LOTRIMIN) 1 % Cream  Active   doxycycline (VIBRAMYCIN) 100 MG Cap  Active   tadalafil (CIALIS) 20 MG tablet  Active                    ALLERGIES  Allergies   Allergen Reactions    Erythromycin     Ilosone [Erythromycin Estolate]        PHYSICAL EXAM  VITAL SIGNS: BP (!) 169/99   Pulse 80   Temp 35.9 °C (96.7 °F) (Temporal)   Resp 16   Ht 1.778 m (5' 10\")   Wt 94.3 kg (207 lb 14.3 oz)   SpO2 97%   BMI 29.83 kg/m²    General: Well- appearing , non-toxic, no acute distress  Neuro:   - Alert and oriented  - CN 2-12 grossly intact  - Upper extremities         - Normal strength         - Sensation intact to light touch  - Lower extremities         - Normal strength         - Sensation intact " to light touch  - No pronator drift  - Normal finger to nose  - No truncal ataxia  - Normal rapid alternating movement  Neck: No rigidity, supple  HEENT:   - Head: Normocephalic, atraumatic  - Eyes: PERRL  - Ears/Nose: normal external nose and ears  - Mouth: moist mucosal membranes  Resp: clear to auscultation, no increased work of breathing  CV: Regular rate and rhythm  Abd: Soft, non-tender, non-distended  Extremities: No peripheral edema  Psych: lucid and conversational         DIAGNOSTIC STUDIES / PROCEDURES    EKG  My independent EKG interpretation:  No results found for this or any previous visit.    LABS  Results for orders placed or performed during the hospital encounter of 11/03/23   Chlamydia/GC, PCR (Urine)    Specimen: Urine   Result Value Ref Range    C. trachomatis by PCR Negative Negative    Gc By Dna Probe Negative Negative    Source Urine        RADIOLOGY  I have independently interpreted the diagnostic imaging associated with this visit and am waiting the final reading from the radiologist.   My preliminary interpretation is as follows:   -   Radiologist interpretation:   No orders to display           MEDICAL DECISION MAKING    ED Observation Status? No; Patient does not meet criteria for ED Observation.     ED COURSE AND PLAN    Ramsey Allen is a 48 y.o. male presenting to the emergency department for an intermittent headache which he has had many times before.  He was concerned about his blood pressure, was seen at urgent care and was sent to the emergency department for evaluation.  On arrival to the emergency department, patient is lucid, conversational with no focal neurologic deficits.  Blood pressure is moderately elevated, but patient has a history of hypertension and is not currently on medication.  I do not feel strongly about obtaining labs, EKG, CT scan of the head at this time.  Will plan to treat patient's pain with Tylenol, Compazine, Benadryl, reevaluate blood pressure and need  for obtaining advanced imaging.    ---Pertinent ED Course---:    10:47 PM I reviewed the patient's old records in Epic, medication list, allergies, past medical history and performed a physical examination.     11:30 PM reevaluated patient after treatment with Tylenol, Compazine, Benadryl.  He is feeling slightly restless from the Compazine but his headache is essentially resolved.  Do not feel that he needs any labs or advanced imaging.  His blood pressure is improved to the 160s without intervention.  Hypertension likely secondary to his headache/pain.  At this time is appropriate for discharge home, strict return precaution discussed.        HTN/IDDM FOLLOW UP:  The patient has known hypertension and is being followed by their primary care doctor      Procedures:      ----------------------------------------------------------------------------------  DISCUSSIONS    I have discussed management of the patient with the following physicians and AUSTYN's:      Discussion of management with other [facility] or appropriate source(s):     Escalation of care considered, and ultimately not performed: Considered but no indication for advanced imaging of the head, labs, EKG     Barriers to care at this time, including but not limited to:     Decision tools and prescription drugs considered including, but not limited to:     FINAL IMPRESSION    1. Nonintractable episodic headache, unspecified headache type        Discharge Medication List as of 2/18/2024 11:01 PM            DISPOSITION    Discharge home, Stable        This chart was dictated using an electronic voice recognition software. The chart has been reviewed and edited but there is still possibility for dictation errors due to limitation of software.    Juancarlos Be, DO 2/18/2024

## 2024-02-26 ENCOUNTER — OFFICE VISIT (OUTPATIENT)
Dept: MEDICAL GROUP | Facility: MEDICAL CENTER | Age: 49
End: 2024-02-26
Payer: COMMERCIAL

## 2024-02-26 VITALS
BODY MASS INDEX: 28.63 KG/M2 | HEART RATE: 84 BPM | DIASTOLIC BLOOD PRESSURE: 90 MMHG | HEIGHT: 70 IN | TEMPERATURE: 98.1 F | RESPIRATION RATE: 16 BRPM | SYSTOLIC BLOOD PRESSURE: 130 MMHG | OXYGEN SATURATION: 94 % | WEIGHT: 200 LBS

## 2024-02-26 DIAGNOSIS — R03.0 ELEVATED BLOOD PRESSURE READING WITHOUT DIAGNOSIS OF HYPERTENSION: ICD-10-CM

## 2024-02-26 DIAGNOSIS — N41.1 CHRONIC PROSTATITIS: ICD-10-CM

## 2024-02-26 DIAGNOSIS — K13.79 ORAL MASS: ICD-10-CM

## 2024-02-26 DIAGNOSIS — M26.629 TMJ PAIN DYSFUNCTION SYNDROME: ICD-10-CM

## 2024-02-26 PROCEDURE — 3075F SYST BP GE 130 - 139MM HG: CPT | Performed by: NURSE PRACTITIONER

## 2024-02-26 PROCEDURE — 99214 OFFICE O/P EST MOD 30 MIN: CPT | Performed by: NURSE PRACTITIONER

## 2024-02-26 PROCEDURE — 3080F DIAST BP >= 90 MM HG: CPT | Performed by: NURSE PRACTITIONER

## 2024-02-26 ASSESSMENT — FIBROSIS 4 INDEX: FIB4 SCORE: 0.73

## 2024-02-26 NOTE — PROGRESS NOTES
Subjective:     Ramsey Allen is a 48 y.o. male who presents with elevated blood pressure.    HPI:   Seen in f/u for elevated blood pressure.  Over the last several months he has been seeing urology for prostatitis.  They initially gave him bactrim.  That didn't help so the changed him to doxycycline and flomase.  That didn't help so he was placed on another 2 wks doxycycline.  He is not having s/e to med with n/v.  It is also causing lightheadedness.  He will be seeing urology on friday this week.  He has chronic anxiety with more severe sx with his health issues resently  Last sunday he woke up with a severe headache.  He was having pulling in his neck.  He was negative for COVID.  Then he checked his bp and it was very elevated.  Went to  and they sent him to ER.  He was tx'd with iv med for the headache.   The next am he had mild headache only.   He got foreskin yeast infection with the antibiotics.    He is having pain in otoniel TMJ areas.  He grinds his teeth at nite.  He will see his dentist this week to make sure its not a dental infection.   He has noted white nodules in upper medial gum line otoniel recently. Some tenderness. 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      alfuzosin (UROXATRAL) 10 MG SR tablet  "TAKE 1 TABLET BY MOUTH EVERY DAY 90 Tablet 1    clotrimazole (LOTRIMIN) 1 % Cream Apply 1 Application topically 2 times a day. 40 g 0    betamethasone dipropionate 0.05 % Cream Apply 1 Application topically 2 times a day. 45 g 0    tadalafil (CIALIS) 20 MG tablet Take 1 Tablet by mouth as needed for Erectile Dysfunction. 6 Tablet 11    albuterol 108 (90 Base) MCG/ACT Aero Soln inhalation aerosol Inhale 2 Puffs every 6 hours as needed for Shortness of Breath. 8.5 g 0     No current facility-administered medications for this visit.       Allergies   Allergen Reactions    Erythromycin     Ilosone [Erythromycin Estolate]        ROS  Constitutional: Negative. Negative for fever, chills, weight loss, malaise/fatigue and diaphoresis.   HENT: Negative. Negative for hearing loss, ear pain, nosebleeds, congestion, sore throat, neck pain, tinnitus and ear discharge.   Respiratory: Negative. Negative for cough, hemoptysis, sputum production, shortness of breath, wheezing and stridor.   Cardiovascular: Negative. Negative for chest pain, palpitations, orthopnea, claudication, leg swelling and PND.   Gastrointestinal: Denies nausea, vomiting, diarrhea, constipation, heartburn, melena or hematochezia.  Genitourinary: Denies dysuria, hematuria, urinary incontinence, frequency or urgency.        Objective:     BP (!) 130/90 (BP Location: Left arm, Patient Position: Sitting, BP Cuff Size: Adult)   Pulse 84   Temp 36.7 °C (98.1 °F) (Temporal)   Resp 16   Ht 1.79 m (5' 10.47\")   Wt 90.7 kg (200 lb)   SpO2 94%  Body mass index is 28.31 kg/m².    Physical Exam:  Vitals reviewed.  Constitutional: Oriented to person, place, and time. appears well-developed and well-nourished. No distress.  Multiple white nodules noted above teeth in gums otoniel w/o reddness  Cardiovascular: Normal rate, regular rhythm, normal heart sounds and intact distal pulses. Exam reveals no gallop and no friction rub. No murmur heard. No carotid bruits. "   Pulmonary/Chest: Effort normal and breath sounds normal. No stridor. No respiratory distress. no wheezes or rales. exhibits no tenderness.   Musculoskeletal: Normal range of motion. exhibits no edema. otoniel pedal pulses 2+.  Lymphadenopathy: No cervical or supraclavicular adenopathy.   Neurological: Alert and oriented to person, place, and time. exhibits normal muscle tone.  Skin: Skin is warm and dry. No diaphoresis.   Psychiatric: Normal mood and affect. Behavior is normal.      Assessment and Plan:     The following treatment plan was discussed:    1. Elevated blood pressure reading without diagnosis of hypertension  MICROALBUMIN CREAT RATIO URINE    monitor BP once daily. f/u 1 mo w/bp diary & bp cuff. low na diet, regular exercise.      2. TMJ pain dysfunction syndrome  DX-TEMPOROMANDIBULAR JOINTS (TMJ)    having otoniel jaw/TMJ pain. clenches his teeth at nite. do TMJ xray if pain continues      3. Chronic prostatitis      continue f/u w/tx by urology.  add probiotic d/t s/e from antibiotics      4. Oral mass      white nodules on gums above teeth otoniel. f/u w/dentist to see if abn            Followup: Return in about 4 weeks (around 3/25/2024), or for lab review and bp check.

## 2024-03-07 ENCOUNTER — APPOINTMENT (OUTPATIENT)
Dept: MEDICAL GROUP | Facility: MEDICAL CENTER | Age: 49
End: 2024-03-07
Payer: COMMERCIAL

## 2024-03-29 ENCOUNTER — HOSPITAL ENCOUNTER (OUTPATIENT)
Dept: LAB | Facility: MEDICAL CENTER | Age: 49
End: 2024-03-29
Attending: NURSE PRACTITIONER
Payer: COMMERCIAL

## 2024-03-29 DIAGNOSIS — R03.0 ELEVATED BLOOD PRESSURE READING WITHOUT DIAGNOSIS OF HYPERTENSION: ICD-10-CM

## 2024-03-29 DIAGNOSIS — N52.9 ERECTILE DYSFUNCTION, UNSPECIFIED ERECTILE DYSFUNCTION TYPE: ICD-10-CM

## 2024-03-29 DIAGNOSIS — E55.9 VITAMIN D DEFICIENCY: ICD-10-CM

## 2024-03-29 DIAGNOSIS — J06.9 RECURRENT URI (UPPER RESPIRATORY INFECTION): ICD-10-CM

## 2024-03-29 LAB
25(OH)D3 SERPL-MCNC: 35 NG/ML (ref 30–100)
ALBUMIN SERPL BCP-MCNC: 4.5 G/DL (ref 3.2–4.9)
ALBUMIN/GLOB SERPL: 1.8 G/DL
ALP SERPL-CCNC: 47 U/L (ref 30–99)
ALT SERPL-CCNC: 53 U/L (ref 2–50)
ANION GAP SERPL CALC-SCNC: 12 MMOL/L (ref 7–16)
AST SERPL-CCNC: 26 U/L (ref 12–45)
BASOPHILS # BLD AUTO: 1 % (ref 0–1.8)
BASOPHILS # BLD: 0.04 K/UL (ref 0–0.12)
BILIRUB SERPL-MCNC: 0.6 MG/DL (ref 0.1–1.5)
BUN SERPL-MCNC: 17 MG/DL (ref 8–22)
CALCIUM ALBUM COR SERPL-MCNC: 8.7 MG/DL (ref 8.5–10.5)
CALCIUM SERPL-MCNC: 9.1 MG/DL (ref 8.4–10.2)
CHLORIDE SERPL-SCNC: 105 MMOL/L (ref 96–112)
CHOLEST SERPL-MCNC: 144 MG/DL (ref 100–199)
CO2 SERPL-SCNC: 21 MMOL/L (ref 20–33)
CREAT SERPL-MCNC: 1 MG/DL (ref 0.5–1.4)
CREAT UR-MCNC: 125.91 MG/DL
EOSINOPHIL # BLD AUTO: 0.11 K/UL (ref 0–0.51)
EOSINOPHIL NFR BLD: 2.7 % (ref 0–6.9)
ERYTHROCYTE [DISTWIDTH] IN BLOOD BY AUTOMATED COUNT: 42 FL (ref 35.9–50)
FASTING STATUS PATIENT QL REPORTED: NORMAL
GFR SERPLBLD CREATININE-BSD FMLA CKD-EPI: 93 ML/MIN/1.73 M 2
GLOBULIN SER CALC-MCNC: 2.5 G/DL (ref 1.9–3.5)
GLUCOSE SERPL-MCNC: 95 MG/DL (ref 65–99)
HCT VFR BLD AUTO: 45.2 % (ref 42–52)
HDLC SERPL-MCNC: 35 MG/DL
HGB BLD-MCNC: 16.1 G/DL (ref 14–18)
IMM GRANULOCYTES # BLD AUTO: 0.01 K/UL (ref 0–0.11)
IMM GRANULOCYTES NFR BLD AUTO: 0.2 % (ref 0–0.9)
LDLC SERPL CALC-MCNC: 78 MG/DL
LYMPHOCYTES # BLD AUTO: 1.26 K/UL (ref 1–4.8)
LYMPHOCYTES NFR BLD: 30.4 % (ref 22–41)
MCH RBC QN AUTO: 31.4 PG (ref 27–33)
MCHC RBC AUTO-ENTMCNC: 35.6 G/DL (ref 32.3–36.5)
MCV RBC AUTO: 88.3 FL (ref 81.4–97.8)
MICROALBUMIN UR-MCNC: <1.2 MG/DL
MICROALBUMIN/CREAT UR: NORMAL MG/G (ref 0–30)
MONOCYTES # BLD AUTO: 0.42 K/UL (ref 0–0.85)
MONOCYTES NFR BLD AUTO: 10.1 % (ref 0–13.4)
NEUTROPHILS # BLD AUTO: 2.3 K/UL (ref 1.82–7.42)
NEUTROPHILS NFR BLD: 55.6 % (ref 44–72)
NRBC # BLD AUTO: 0 K/UL
NRBC BLD-RTO: 0 /100 WBC (ref 0–0.2)
PLATELET # BLD AUTO: 210 K/UL (ref 164–446)
PMV BLD AUTO: 9 FL (ref 9–12.9)
POTASSIUM SERPL-SCNC: 4.6 MMOL/L (ref 3.6–5.5)
PROT SERPL-MCNC: 7 G/DL (ref 6–8.2)
RBC # BLD AUTO: 5.12 M/UL (ref 4.7–6.1)
SODIUM SERPL-SCNC: 138 MMOL/L (ref 135–145)
TRIGL SERPL-MCNC: 154 MG/DL (ref 0–149)
WBC # BLD AUTO: 4.1 K/UL (ref 4.8–10.8)

## 2024-03-29 PROCEDURE — 82570 ASSAY OF URINE CREATININE: CPT

## 2024-03-29 PROCEDURE — 85025 COMPLETE CBC W/AUTO DIFF WBC: CPT

## 2024-03-29 PROCEDURE — 82043 UR ALBUMIN QUANTITATIVE: CPT

## 2024-03-29 PROCEDURE — 84402 ASSAY OF FREE TESTOSTERONE: CPT

## 2024-03-29 PROCEDURE — 82306 VITAMIN D 25 HYDROXY: CPT

## 2024-03-29 PROCEDURE — 80053 COMPREHEN METABOLIC PANEL: CPT

## 2024-03-29 PROCEDURE — 36415 COLL VENOUS BLD VENIPUNCTURE: CPT

## 2024-03-29 PROCEDURE — 84270 ASSAY OF SEX HORMONE GLOBUL: CPT

## 2024-03-29 PROCEDURE — 80061 LIPID PANEL: CPT

## 2024-03-29 PROCEDURE — 84403 ASSAY OF TOTAL TESTOSTERONE: CPT

## 2024-03-30 LAB
SHBG SERPL-SCNC: 23 NMOL/L (ref 17–56)
TESTOST FREE MFR SERPL: 2.1 % (ref 1.6–2.9)
TESTOST FREE SERPL-MCNC: 67 PG/ML (ref 47–244)
TESTOST SERPL-MCNC: 310 NG/DL (ref 300–890)

## 2024-04-04 ENCOUNTER — HOSPITAL ENCOUNTER (OUTPATIENT)
Dept: RADIOLOGY | Facility: MEDICAL CENTER | Age: 49
End: 2024-04-04
Attending: NURSE PRACTITIONER
Payer: COMMERCIAL

## 2024-04-04 ENCOUNTER — APPOINTMENT (OUTPATIENT)
Dept: MEDICAL GROUP | Facility: MEDICAL CENTER | Age: 49
End: 2024-04-04
Payer: COMMERCIAL

## 2024-04-04 VITALS
OXYGEN SATURATION: 100 % | WEIGHT: 207.3 LBS | HEIGHT: 70 IN | HEART RATE: 84 BPM | SYSTOLIC BLOOD PRESSURE: 148 MMHG | TEMPERATURE: 98.7 F | BODY MASS INDEX: 29.68 KG/M2 | DIASTOLIC BLOOD PRESSURE: 88 MMHG

## 2024-04-04 DIAGNOSIS — F41.1 GENERALIZED ANXIETY DISORDER: ICD-10-CM

## 2024-04-04 DIAGNOSIS — I10 PRIMARY HYPERTENSION: ICD-10-CM

## 2024-04-04 DIAGNOSIS — M79.671 RIGHT FOOT PAIN: ICD-10-CM

## 2024-04-04 DIAGNOSIS — M26.629 TMJ PAIN DYSFUNCTION SYNDROME: ICD-10-CM

## 2024-04-04 PROCEDURE — 73630 X-RAY EXAM OF FOOT: CPT | Mod: RT

## 2024-04-04 PROCEDURE — 99214 OFFICE O/P EST MOD 30 MIN: CPT | Performed by: NURSE PRACTITIONER

## 2024-04-04 PROCEDURE — 3077F SYST BP >= 140 MM HG: CPT | Performed by: NURSE PRACTITIONER

## 2024-04-04 PROCEDURE — 3079F DIAST BP 80-89 MM HG: CPT | Performed by: NURSE PRACTITIONER

## 2024-04-04 RX ORDER — LISINOPRIL 5 MG/1
5 TABLET ORAL DAILY
Qty: 30 TABLET | Refills: 5 | Status: SHIPPED | OUTPATIENT
Start: 2024-04-04

## 2024-04-04 ASSESSMENT — FIBROSIS 4 INDEX: FIB4 SCORE: 0.78

## 2024-04-04 ASSESSMENT — PATIENT HEALTH QUESTIONNAIRE - PHQ9: CLINICAL INTERPRETATION OF PHQ2 SCORE: 0

## 2024-04-04 NOTE — PROGRESS NOTES
Subjective:     Ramsey Allen is a 48 y.o. male who presents with HTN.    HPI:     Seen in f/u for HTN.    Problem #1:HTN  He has been having elevated BP.  He brings in bp diary with multiple elevated BP.  He has not been able to exercise d/t his recurrent prostatitis for the last 6 mo.  He is now well and ready to restart exercising and eating healthy.  Over the last month he has been eating healthier sometimes.Reviewed risks and benefits of lisinopril with pt including but not limited to: cough, angioedema, elevated k+.      Problem #2: anxiety  He is still having anxiety sx. He has not seen his psychiatrist since november.  He is still having more stress over minor difficulties.  He is letting things bother him more than they should.  He is going to see psych soon.     Problem #3:rt foot pain  Over the last several weeks he is having left foot pain.  Mostly on ball of foot and radiates up to top of foot to ankle.  Mostly occurs after resting.      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 30 Tablet 5    alfuzosin (UROXATRAL) 10 MG SR tablet TAKE 1 TABLET BY MOUTH EVERY DAY 90 Tablet 1    clotrimazole (LOTRIMIN) 1 % Cream Apply 1 Application topically 2 times a day. 40 g 0    betamethasone dipropionate 0.05 % Cream Apply 1 Application topically 2 times a  "day. 45 g 0    tadalafil (CIALIS) 20 MG tablet Take 1 Tablet by mouth as needed for Erectile Dysfunction. 6 Tablet 11    albuterol 108 (90 Base) MCG/ACT Aero Soln inhalation aerosol Inhale 2 Puffs every 6 hours as needed for Shortness of Breath. 8.5 g 0     No current facility-administered medications for this visit.       Allergies   Allergen Reactions    Doxycycline Nausea    Erythromycin     Ilosone [Erythromycin Estolate]          ROS  Constitutional: Negative. Negative for fever, chills, weight loss, malaise/fatigue and diaphoresis.   HENT: Negative. Negative for hearing loss, ear pain, nosebleeds, congestion, sore throat, neck pain, tinnitus and ear discharge.   Respiratory: Negative. Negative for cough, hemoptysis, sputum production, shortness of breath, wheezing and stridor.   Cardiovascular: Negative. Negative for chest pain, palpitations, orthopnea, claudication, leg swelling and PND.   Gastrointestinal: Denies nausea, vomiting, diarrhea, constipation, heartburn, melena or hematochezia.  Genitourinary: Denies dysuria, hematuria, urinary incontinence, frequency or urgency.        Objective:     BP (!) 148/88 (BP Location: Left arm, Patient Position: Sitting, BP Cuff Size: Adult)   Pulse 84   Temp 37.1 °C (98.7 °F) (Temporal)   Ht 1.778 m (5' 10\")   Wt 94 kg (207 lb 4.8 oz)   SpO2 100%  Body mass index is 29.74 kg/m².    Physical Exam:  Vitals reviewed.  Constitutional: Oriented to person, place, and time. appears well-developed and well-nourished. No distress.   Cardiovascular: Normal rate, regular rhythm, normal heart sounds and intact distal pulses. Exam reveals no gallop and no friction rub. No murmur heard. No carotid bruits.   Pulmonary/Chest: Effort normal and breath sounds normal. No stridor. No respiratory distress. no wheezes or rales. exhibits no tenderness.   Musculoskeletal: Normal range of motion. exhibits no edema. otoniel pedal pulses 2+.  Lymphadenopathy: No cervical or supraclavicular " adenopathy.   Neurological: Alert and oriented to person, place, and time. exhibits normal muscle tone.  Skin: Skin is warm and dry. No diaphoresis.   Psychiatric: Normal mood and affect. Behavior is normal.      Assessment and Plan:     The following treatment plan was discussed:    1. Primary hypertension  lisinopril (PRINIVIL) 5 MG Tab    restart lisinopril 5 mg daily. monitor bp. improve healthy diet and regular exercise. f/u 1 mo for bp ck, then f/u 6 mo for lab review. call for lab slip      2. Right foot pain  DX-FOOT-COMPLETE 3+ RIGHT    Referral to Podiatry    poss portillo's neuroma. get supportive shoes. get small foot pad to put in ball of shoes. xray foot. f/u w/pt w/results. refer podiary.      3. Generalized anxiety disorder      anxiety continues to be poorly controlled.  he has not seen his psychiatrist recently.  will f/u with him soon.            Followup: Return in about 4 weeks (around 5/2/2024), or for bp check.

## 2024-04-15 ENCOUNTER — OFFICE VISIT (OUTPATIENT)
Dept: URGENT CARE | Facility: CLINIC | Age: 49
End: 2024-04-15
Payer: COMMERCIAL

## 2024-04-15 VITALS
OXYGEN SATURATION: 98 % | DIASTOLIC BLOOD PRESSURE: 86 MMHG | BODY MASS INDEX: 29.63 KG/M2 | RESPIRATION RATE: 16 BRPM | TEMPERATURE: 98.6 F | HEART RATE: 86 BPM | WEIGHT: 207 LBS | SYSTOLIC BLOOD PRESSURE: 138 MMHG | HEIGHT: 70 IN

## 2024-04-15 DIAGNOSIS — S81.801A WOUND OF RIGHT LOWER EXTREMITY, INITIAL ENCOUNTER: ICD-10-CM

## 2024-04-15 DIAGNOSIS — Z23 NEED FOR VACCINATION: ICD-10-CM

## 2024-04-15 PROCEDURE — 90471 IMMUNIZATION ADMIN: CPT | Performed by: FAMILY MEDICINE

## 2024-04-15 PROCEDURE — 3079F DIAST BP 80-89 MM HG: CPT | Performed by: FAMILY MEDICINE

## 2024-04-15 PROCEDURE — 99213 OFFICE O/P EST LOW 20 MIN: CPT | Mod: 25 | Performed by: FAMILY MEDICINE

## 2024-04-15 PROCEDURE — 90715 TDAP VACCINE 7 YRS/> IM: CPT | Performed by: FAMILY MEDICINE

## 2024-04-15 PROCEDURE — 3075F SYST BP GE 130 - 139MM HG: CPT | Performed by: FAMILY MEDICINE

## 2024-04-15 RX ORDER — TAMSULOSIN HYDROCHLORIDE 0.4 MG/1
CAPSULE ORAL
COMMUNITY

## 2024-04-15 ASSESSMENT — FIBROSIS 4 INDEX: FIB4 SCORE: 0.78

## 2024-04-15 NOTE — PROGRESS NOTES
"  Subjective:      48 y.o. male presents to urgent care for wound to his right calf that he sustained while hiking on Saturday.  He accidentally got caught on some arnaldo barbed wire.  He did clean out the area very well and has been using triple antibiotic ointment on top of it since then.  No associated pain.  He remains afebrile. We have no prior Tdap on file.    He denies any other questions or concerns at this time.    Current problem list, medication, and past medical/surgical history were reviewed in Epic.    ROS  See HPI     Objective:      /86   Pulse 86   Temp 37 °C (98.6 °F)   Resp 16   Ht 1.778 m (5' 10\")   Wt 93.9 kg (207 lb)   SpO2 98%   BMI 29.70 kg/m²     Physical Exam  Constitutional:       General: He is not in acute distress.     Appearance: He is not diaphoretic.   Cardiovascular:      Rate and Rhythm: Normal rate and regular rhythm.      Heart sounds: Normal heart sounds.   Pulmonary:      Effort: Pulmonary effort is normal. No respiratory distress.      Breath sounds: Normal breath sounds.   Skin:     Comments: Small laceration approximately 2 mm in size to the posterior aspect of his right calf.  No discharge.  No surrounding erythema.   Neurological:      Mental Status: He is alert.   Psychiatric:         Mood and Affect: Affect normal.         Judgment: Judgment normal.       Assessment/Plan:     1. Wound of right lower extremity, initial encounter  2. Need for vaccination  No signs of infection of the laceration.  Tdap updated today in urgent care.  - Tdap =>6yo IM      Instructed to return to Urgent Care or nearest Emergency Department if symptoms fail to improve, for any change in condition, further concerns, or new concerning symptoms. Patient states understanding of the plan of care and discharge instructions.    Ivelisse Briceno M.D.   " Referring Provider (Optional): VAL Sheikh

## 2024-05-08 ENCOUNTER — OFFICE VISIT (OUTPATIENT)
Dept: BEHAVIORAL HEALTH | Facility: CLINIC | Age: 49
End: 2024-05-08
Payer: COMMERCIAL

## 2024-05-08 DIAGNOSIS — F41.1 GAD (GENERALIZED ANXIETY DISORDER): ICD-10-CM

## 2024-05-08 DIAGNOSIS — Z65.8 PSYCHOSOCIAL STRESSORS: ICD-10-CM

## 2024-05-08 PROCEDURE — 99214 OFFICE O/P EST MOD 30 MIN: CPT | Performed by: PSYCHIATRY & NEUROLOGY

## 2024-05-08 RX ORDER — ESCITALOPRAM OXALATE 10 MG/1
10 TABLET ORAL EVERY MORNING
Qty: 30 TABLET | Refills: 0 | Status: SHIPPED | OUTPATIENT
Start: 2024-05-08 | End: 2024-05-31

## 2024-05-14 NOTE — PROGRESS NOTES
Evaluation completed by: Sandeep Pickering M.D.   Date of Service: 05/08/2024  Appointment type: in-office appointment.    Information below was collected from: patient      CHIEF COMPLIANT:  Medication Management      HPI:   Ramsey Allen is a 49 y.o. old male who presents today for follow up appointment to address Medication Management  .     Patient reports that he has been feeling more anxious recently.  He feels distressed when he receives calls or texts from unknown numbers and he worries that he could be targeted by scammers.  He spends significant amount of time looking up the numbers that call him to determine whether they are associated with scams.  He gets texts from unknown numbers and he worries that his information could be hacked.  He finds himself unable to ignore these out of fear that if he is not vigilant, he will miss something that could have been prevented.  This has started to interfere with his quality of life.  He is open to trying medication at this time due to level of distress.  He is also interested in trying therapy to help regain sense of control over these fears.    CURRENT MEDICATIONS    Current Outpatient Medications:     escitalopram (LEXAPRO) 10 MG Tab, Take 1 Tablet by mouth every morning for 30 days., Disp: 30 Tablet, Rfl: 0    tamsulosin (FLOMAX) 0.4 MG capsule, Take 1 capsule every day by oral route at bedtime for 90 days., Disp: , Rfl:     lisinopril (PRINIVIL) 5 MG Tab, Take 1 Tablet by mouth every day., Disp: 30 Tablet, Rfl: 5    tadalafil (CIALIS) 20 MG tablet, Take 1 Tablet by mouth as needed for Erectile Dysfunction., Disp: 6 Tablet, Rfl: 11    albuterol 108 (90 Base) MCG/ACT Aero Soln inhalation aerosol, Inhale 2 Puffs every 6 hours as needed for Shortness of Breath., Disp: 8.5 g, Rfl: 0      MEDICAL HISTORY  Past Medical History:   Diagnosis Date    Anxiety     ED (erectile dysfunction)     Hypertension     LBP (low back pain)     Meningitis     Post traumatic  stress disorder (PTSD)     Recurrent sinusitis      Allergies:   Allergies   Allergen Reactions    Doxycycline Nausea    Erythromycin     Ilosone [Erythromycin Estolate]      @pre    SURGICAL HISTORY  Past Surgical History:   Procedure Laterality Date    SEPTOTURBINOPLASTY  8/22/08    Performed by LEIDA SOLOMON at SURGERY [facility]    ANTROSTOMY  8/22/08    Performed by LEIDA SOLOMON at SURGERY [facility]    SINUSCOPY  8/22/08    Performed by LEIDA SOLOMON at SURGERY [facility]        FAMILY PSYCHIATRIC HISTORY  Family History   Problem Relation Age of Onset    Diabetes Neg Hx     Heart Disease Neg Hx     Hypertension Neg Hx     Psychiatric Illness Neg Hx        SOCIAL HISTORY  Reviewed with patient and no changes.       PSYCHIATRIC EXAMINATION   Mental Status Exam    Appearance: Appropriate dress and grooming  Sensorium: Alert and Oriented X 4  Behavior: Appropriate  Motor Activity: Unremarkable  Eye Contact: Adequate  Speech: Normal  Mood: Neutral  Affect: Congruent with normal range  Thought Flow: Linear  Thought Content: Excessive worries, Negative intrusive thoughts  Suicidality: Denies  Hallucinations: Denies  Cognition: Normal  Insight: Intact  Reliability: Apparently reliable  Judgement: Good            CURRENT RISK ASSESSMENT       Suicide: Low       Homicide: Low       Self-Harm: Low       Relapse: Not applicable       Crisis Safety Plan Reviewed Not Indicated    [place]  records   reviewed.  No concerns about misuse of controlled substance.    ASSESSMENT  Ramsey Allen is a 49 y.o. old male who presents today for follow up appointment to address Medication Management  Patient has been experiencing increased anxiety that is centered around fears that his technology will be hacked and his information will be stolen.  He receives texts and calls from unknown numbers and worries that if he does not investigate these numbers, he might miss something that would  have prevented these outcomes.  He searches the internet for the numbers to determine whether they were known [employer] numbers.  He is distressed by these experiences and today we will start Lexapro 10 mg with 5 mg titration for first week.  We will also plan to initiate psychotherapy with this physician at [facility] office - information given.  We discussed short-term therapy due to physician leaving clinics in July and plan to transition care at that time.    DIAGNOSES/PLAN  1. TULIO (generalized anxiety disorder)  2. Psychosocial stressors     -Start Lexapro 5 mg (1/2 tablet) for first week and then increase to 10 mg (1 tablet) daily  -Start therapy with this physician at [facility] office - information given      Medication options, alternatives (including no medications) and medication risks/benefits/side effects were discussed in detail.  The patient was advised to call, message clinician on [employer], or come in to the clinic if symptoms worsen or if questions/issues regarding their medications arise.  The patient verbalized understanding and agreement.    The patient was educated to call 911, call the suicide hotline, or go to the local ER if having thoughts of suicide or homicide.  The patient verbalized understanding and agreement.   The proposed treatment plan was discussed with the patient who was provided the opportunity to ask questions and make suggestions regarding alternative treatment. Patient verbalized understanding and expressed agreement with the plan.      Return to clinic in 2 weeks or sooner if symptoms worsen.  Contact [facility] to initiate psychotherapy with this physician.    This appointment was supervised by attending psychiatrist, who agrees with assessment and treatment plan.  See attending attestation for more details.     Sandeep Pickering M.D.

## 2024-05-15 ENCOUNTER — OFFICE VISIT (OUTPATIENT)
Dept: MEDICAL GROUP | Facility: MEDICAL CENTER | Age: 49
End: 2024-05-15
Payer: COMMERCIAL

## 2024-05-15 VITALS
HEART RATE: 76 BPM | DIASTOLIC BLOOD PRESSURE: 92 MMHG | TEMPERATURE: 98.6 F | OXYGEN SATURATION: 99 % | WEIGHT: 209.4 LBS | SYSTOLIC BLOOD PRESSURE: 152 MMHG | HEIGHT: 70 IN | BODY MASS INDEX: 29.98 KG/M2

## 2024-05-15 DIAGNOSIS — J35.9: ICD-10-CM

## 2024-05-15 DIAGNOSIS — I10 PRIMARY HYPERTENSION: ICD-10-CM

## 2024-05-15 DIAGNOSIS — F41.1 GENERALIZED ANXIETY DISORDER: ICD-10-CM

## 2024-05-15 PROCEDURE — 3080F DIAST BP >= 90 MM HG: CPT | Performed by: NURSE PRACTITIONER

## 2024-05-15 PROCEDURE — 3077F SYST BP >= 140 MM HG: CPT | Performed by: NURSE PRACTITIONER

## 2024-05-15 PROCEDURE — 99214 OFFICE O/P EST MOD 30 MIN: CPT | Performed by: NURSE PRACTITIONER

## 2024-05-15 ASSESSMENT — FIBROSIS 4 INDEX: FIB4 SCORE: 0.8

## 2024-05-15 NOTE — PROGRESS NOTES
Subjective:     Ramsey Allen is a 49 y.o. male who presents with HTN.    HPI:     Seen in f/u for HTN.    Problem #1:HTN  He is here for bp check.  His bp is still elevated both here and at home but hasn't been checking much to avoid getting stressed about it.  He is taking the lisinopril 5 mg daily w/o s/e.  Today in office his bp is elevated.  No headache, SOB or chest pain.      Problem #2:anxiety  He started lexapro about 10 days ago.  He is now on 5 mg and will increase to 10 mg soon.  He is seeing psych for counseling.  The psych is leaving in 6 wks so pt feels that sx can be helped in that time.  He has been on vacation 1st time in 6 yrs.        Problem #3:  tonsil nodule  He has been monitoring his tonsils.  He has noted 3 small white lumps on the rt tonsil.  No sore throat.  No tonsil pain.  No sx at all.  Was just looking at the tonsils and noted the nodules.        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      escitalopram (LEXAPRO) 10 MG Tab Take 1 Tablet by mouth every morning for 30 days. 30 Tablet 0    tamsulosin (FLOMAX) 0.4 MG capsule Take 1 capsule every day by oral route at bedtime for 90 days.      lisinopril (PRINIVIL) 5 MG Tab Take 1 Tablet by mouth every day. 30 Tablet 5    tadalafil (CIALIS) 20 MG tablet Take 1 Tablet by  "mouth as needed for Erectile Dysfunction. 6 Tablet 11    albuterol 108 (90 Base) MCG/ACT Aero Soln inhalation aerosol Inhale 2 Puffs every 6 hours as needed for Shortness of Breath. 8.5 g 0     No current facility-administered medications for this visit.       Allergies   Allergen Reactions    Doxycycline Nausea    Erythromycin     Ilosone [Erythromycin Estolate]        ROS  Constitutional: Negative. Negative for fever, chills, weight loss, malaise/fatigue and diaphoresis.   HENT: Negative. Negative for hearing loss, ear pain, nosebleeds, congestion, sore throat, neck pain, tinnitus and ear discharge.   Respiratory: Negative. Negative for cough, hemoptysis, sputum production, shortness of breath, wheezing and stridor.   Cardiovascular: Negative. Negative for chest pain, palpitations, orthopnea, claudication, leg swelling and PND.   Gastrointestinal: Denies nausea, vomiting, diarrhea, constipation, heartburn, melena or hematochezia.  Genitourinary: Denies dysuria, hematuria, urinary incontinence, frequency or urgency.        Objective:     BP (!) 152/92 (BP Location: Left arm, Patient Position: Sitting, BP Cuff Size: Adult)   Pulse 76   Temp 37 °C (98.6 °F) (Temporal)   Ht 1.778 m (5' 10\")   Wt 95 kg (209 lb 6.4 oz)   SpO2 99%  Body mass index is 30.05 kg/m².    Physical Exam:  Physical Exam   Vitals reviewed.  Constitutional: oriented to person, place, and time. appears well-developed and well-nourished. No distress.   HENT:  Head: Normocephalic and atraumatic. Right Ear: External ear normal. Left Ear: External ear normal. Nose: Nose normal. Mouth/Throat: Oropharynx is clear and moist. No oropharyngeal exudate.  otoniel tm wnl.  Eyes: Right eye exhibits no discharge. Left eye exhibits no discharge. No scleral icterus.  Neck: No JVD present.  Rt tonsil 3 - 1 mm round white nodules with top nodule largest, decreasing in size w/next 2.  No reddness, dg.    Cardiovascular: Normal rate, regular rhythm, normal heart " sounds and intact distal pulses.  Exam reveals no gallop and no friction rub.  No murmur heard.  No carotid bruits.   Pulmonary/Chest: Effort normal and breath sounds normal. No stridor. No respiratory distress. no wheezes or rales. exhibits no tenderness.   Musculoskeletal: Normal range of motion.   Lymphadenopathy: no cervical or supraclavicular adenopathy.   Neurological: alert and oriented to person, place, and time. exhibits normal muscle tone. Coordination normal.   Skin: Skin is warm and dry. no diaphoresis.   Psychiatric: normal mood and affect. behavior is normal.     Assessment and Plan:     The following treatment plan was discussed:    1. Primary hypertension      no chg in med. cont to monitor. f/u after 1 mo on 10 mg lexapro. will assess bp if r/t anxiety or HTN. increase lisinopril if bp still up & stable on lexapro      2. Generalized anxiety disorder      continue f/u with psych and lexapro as per psych to control anxiety. f/u 1 mo for BP ck. if bp elevated will increase lisinopril      3. Disorder of lingual tonsil      3 small nodules on rt tonsil. no sx of infection. flash 1 mo. refer ENT if concerns            Followup: Return in about 4 weeks (around 6/12/2024), or for bp ck and tonsil eval.

## 2024-05-21 ENCOUNTER — APPOINTMENT (OUTPATIENT)
Dept: BEHAVIORAL HEALTH | Facility: PSYCHIATRIC FACILITY | Age: 49
End: 2024-05-21
Payer: COMMERCIAL

## 2024-05-21 DIAGNOSIS — F41.1 GAD (GENERALIZED ANXIETY DISORDER): ICD-10-CM

## 2024-05-21 DIAGNOSIS — F42.2 MIXED OBSESSIONAL THOUGHTS AND ACTS: ICD-10-CM

## 2024-05-21 PROCEDURE — 90837 PSYTX W PT 60 MINUTES: CPT | Performed by: STUDENT IN AN ORGANIZED HEALTH CARE EDUCATION/TRAINING PROGRAM

## 2024-05-31 DIAGNOSIS — F41.1 GAD (GENERALIZED ANXIETY DISORDER): ICD-10-CM

## 2024-05-31 DIAGNOSIS — Z65.8 PSYCHOSOCIAL STRESSORS: ICD-10-CM

## 2024-05-31 RX ORDER — ESCITALOPRAM OXALATE 10 MG/1
10 TABLET ORAL EVERY MORNING
Qty: 90 TABLET | Refills: 1 | Status: SHIPPED | OUTPATIENT
Start: 2024-05-31 | End: 2024-11-27

## 2024-06-04 ENCOUNTER — OFFICE VISIT (OUTPATIENT)
Dept: BEHAVIORAL HEALTH | Facility: PSYCHIATRIC FACILITY | Age: 49
End: 2024-06-04
Payer: COMMERCIAL

## 2024-06-04 DIAGNOSIS — F41.1 GAD (GENERALIZED ANXIETY DISORDER): ICD-10-CM

## 2024-06-04 DIAGNOSIS — F42.2 MIXED OBSESSIONAL THOUGHTS AND ACTS: ICD-10-CM

## 2024-06-04 PROCEDURE — 90837 PSYTX W PT 60 MINUTES: CPT | Performed by: STUDENT IN AN ORGANIZED HEALTH CARE EDUCATION/TRAINING PROGRAM

## 2024-06-04 NOTE — PROGRESS NOTES
[facility]  Psychotherapy Summary Note    Full therapy note has been documented and is under restricted viewing.  Please see below for summary of today's session.     Patient Name: Ramsey Allen  Patient MRN: [id number]  Today's Date: 05/21/2024    Resident/Fellow providing service: Sandeep Pickering M.D.    Type of session:Individual psychotherapy  Session start time: 2pm   Session stop time: 3pm  Length of time spent face to face with patient: 60 min  Persons in attendance:Patient    DSM-V Diagnoses:   1. TULIO (generalized anxiety disorder)    2. Mixed obsessional thoughts and acts         Current Medications:  Lexapro 10 mg po daily    Medications Reviewed: Yes:      Symptoms currently being addressed in therapy: anxiety and obsessions/compulsions    Therapeutic Intervention(s): CBT, exposure    Treatment Goal(s)/Objective(s) addressed: tolerate stressors     Progress toward Treatment Goals: No change    Plan:      - Continue biweekly therapy.    Discussed with supervising attending. Please see attending attestation for further details.    Sandeep Pickering M.D.

## 2024-06-04 NOTE — PSYCHOTHERAPY
" [facility]  Psychotherapy Progress Note    Patient Name: Ramsey Allen  Patient MRN: [id number]  Today's Date: 5/21/2024     Resident/Fellow providing service: Sandeep Pickering M.D.  Supervising Attending: Sandepe Chandler MD    Type of session:Individual psychotherapy  Session start time: 2pm  Session stop time: 3pm  Length of time spent face to face with patient: 60 minutes  Persons in attendance:Patient    Subjective/New Info: Discussed obsessive thoughts/fears and compulsive behaviors.  Created plan for tolerating phone messages without checking behaviors.  Also reviewed other sources of anxiety    Objective/Observations:   Participation: Active verbal participation   Grooming: Casual   Cognition: Alert and Fully Oriented   Eye contact: Good   Mood: \"ok\"   Affect: Flexible and Constricted   Thought process: Logical and Goal-directed   Speech: Rate within normal limits and Volume within normal limits   Other:     Diagnoses:   1. TULIO (generalized anxiety disorder)    2. Mixed obsessional thoughts and acts         Current assessment of risk:   SUICIDE: Low   Homicide: Low   Self-harm: Low   Relapse: Not applicable   Other:    Safety Plan reviewed? Yes   If evidence of imminent risk is present, intervention/plan: n/a    Therapeutic Intervention(s): Cognitive behavioral therapy, Distress tolerance skills, Goal-setting, Maladaptive behavior addressed, Positive behavior reinforced, Problem-solving, Review treatment plan, and Stressors assessed    Treatment Goal(s)/Objective(s) addressed: tolerating stressors without compulsive behaviors     Progress toward Treatment Goals: No change    Plan:      - Continue biweekly therapy.    Sandeep Pickering M.D.  6/4/2024                              "

## 2024-06-05 NOTE — PSYCHOTHERAPY
" [facility]  Psychotherapy Progress Note    Patient Name: Ramsey Allen  Patient MRN: [id number]  Today's Date: 6/4/2024     Resident/Fellow providing service: Sandeep Pickering M.D.  Supervising Attending: Sandeep Chandler MD    Type of session:Individual psychotherapy  Session start time: 2pm  Session stop time: 3pm  Length of time spent face to face with patient: 60 minutes  Persons in attendance:Patient    Subjective/New Info: Discussed obsessive thoughts/fears and compulsive behaviors.  Discussed plan to increase Lexapro to 15 mg due to improvement.  Discussed plan to schedule therapy at [facility] after June.  Also reviewed other sources of anxiety    Objective/Observations:   Participation: Active verbal participation   Grooming: Casual   Cognition: Alert and Fully Oriented   Eye contact: Good   Mood: \"ok\"   Affect: Flexible and Constricted   Thought process: Logical and Goal-directed   Speech: Rate within normal limits and Volume within normal limits   Other:     Diagnoses:   1. TULIO (generalized anxiety disorder)    2. Mixed obsessional thoughts and acts         Current assessment of risk:   SUICIDE: Low   Homicide: Low   Self-harm: Low   Relapse: Not applicable   Other:    Safety Plan reviewed? Yes   If evidence of imminent risk is present, intervention/plan: n/a    Therapeutic Intervention(s): Cognitive behavioral therapy, Distress tolerance skills, Goal-setting, Maladaptive behavior addressed, Positive behavior reinforced, Problem-solving, Review treatment plan, and Stressors assessed    Treatment Goal(s)/Objective(s) addressed: tolerating stressors without compulsive behaviors     Progress toward Treatment Goals: No change    Plan:      - Continue biweekly therapy.    Sandeep Pickering M.D.  6/4/2024                              "

## 2024-06-05 NOTE — PROGRESS NOTES
[facility]  Psychotherapy Summary Note    Full therapy note has been documented and is under restricted viewing.  Please see below for summary of today's session.     Patient Name: Ramsey Allen  Patient MRN: [id number]  Today's Date: 06/04/2024    Resident/Fellow providing service: Sandeep Pickering M.D.    Type of session:Individual psychotherapy  Session start time: 2pm   Session stop time: 3pm  Length of time spent face to face with patient: 60 min  Persons in attendance:Patient    DSM-V Diagnoses:   1. TULIO (generalized anxiety disorder)    2. Mixed obsessional thoughts and acts         Current Medications:  Lexapro 10 mg po daily    Medications Reviewed: Yes:      Symptoms currently being addressed in therapy: anxiety and obsessions/compulsions    Therapeutic Intervention(s): CBT, exposure    Treatment Goal(s)/Objective(s) addressed: tolerate stressors     Progress toward Treatment Goals: Mild improvement    Plan:      - Continue biweekly therapy.  -Increase Lexapro to 15 mg po daily    Discussed with supervising attending. Please see attending attestation for further details.    Sandeep Pickering M.D.

## 2024-06-18 ENCOUNTER — APPOINTMENT (OUTPATIENT)
Dept: BEHAVIORAL HEALTH | Facility: PSYCHIATRIC FACILITY | Age: 49
End: 2024-06-18
Payer: COMMERCIAL

## 2024-07-10 ENCOUNTER — OFFICE VISIT (OUTPATIENT)
Dept: MEDICAL GROUP | Facility: MEDICAL CENTER | Age: 49
End: 2024-07-10
Payer: COMMERCIAL

## 2024-07-10 VITALS
SYSTOLIC BLOOD PRESSURE: 142 MMHG | OXYGEN SATURATION: 97 % | TEMPERATURE: 98.6 F | HEART RATE: 78 BPM | HEIGHT: 70 IN | BODY MASS INDEX: 30.86 KG/M2 | DIASTOLIC BLOOD PRESSURE: 98 MMHG | WEIGHT: 215.6 LBS

## 2024-07-10 DIAGNOSIS — F41.9 ANXIETY: ICD-10-CM

## 2024-07-10 DIAGNOSIS — Z65.8 PSYCHOSOCIAL STRESSORS: ICD-10-CM

## 2024-07-10 DIAGNOSIS — F41.1 GAD (GENERALIZED ANXIETY DISORDER): ICD-10-CM

## 2024-07-10 DIAGNOSIS — I10 PRIMARY HYPERTENSION: ICD-10-CM

## 2024-07-10 PROCEDURE — 3080F DIAST BP >= 90 MM HG: CPT | Performed by: NURSE PRACTITIONER

## 2024-07-10 PROCEDURE — 99214 OFFICE O/P EST MOD 30 MIN: CPT | Performed by: NURSE PRACTITIONER

## 2024-07-10 PROCEDURE — 3077F SYST BP >= 140 MM HG: CPT | Performed by: NURSE PRACTITIONER

## 2024-07-10 RX ORDER — ESCITALOPRAM OXALATE 10 MG/1
15 TABLET ORAL EVERY MORNING
Qty: 135 TABLET | Refills: 1 | Status: SHIPPED | OUTPATIENT
Start: 2024-07-10 | End: 2025-01-06

## 2024-07-10 RX ORDER — OLMESARTAN MEDOXOMIL 5 MG/1
5 TABLET ORAL DAILY
Qty: 30 TABLET | Refills: 1 | Status: SHIPPED | OUTPATIENT
Start: 2024-07-10

## 2024-07-10 ASSESSMENT — FIBROSIS 4 INDEX: FIB4 SCORE: 0.8

## 2024-08-01 DIAGNOSIS — F41.1 GAD (GENERALIZED ANXIETY DISORDER): ICD-10-CM

## 2024-08-01 NOTE — TELEPHONE ENCOUNTER
Received request via: Pharmacy    Was the patient seen in the last year in this department? Yes    Does the patient have an active prescription (recently filled or refills available) for medication(s) requested? yes    Pharmacy Name: To be filled at: [facility]/pharmacy #[id number] - [place], [place] - [address]      Does the patient have longterm Plus and need 100 day supply (blood pressure, diabetes and cholesterol meds only)? Patient does not have SCP    Requested Prescriptions     Pending Prescriptions Disp Refills    olmesartan (BENICAR) 5 MG tablet [Pharmacy Med Name: OLMESARTAN MEDOXOMIL 5 MG TAB] 90 Tablet 1     Sig: TAKE 1 TABLET BY MOUTH EVERY DAY

## 2024-08-03 RX ORDER — OLMESARTAN MEDOXOMIL 5 MG/1
5 TABLET ORAL DAILY
Qty: 30 TABLET | Refills: 0 | Status: SHIPPED | OUTPATIENT
Start: 2024-08-03

## 2024-08-14 ENCOUNTER — APPOINTMENT (OUTPATIENT)
Dept: MEDICAL GROUP | Facility: MEDICAL CENTER | Age: 49
End: 2024-08-14
Payer: COMMERCIAL

## 2024-09-05 DIAGNOSIS — F41.1 GAD (GENERALIZED ANXIETY DISORDER): ICD-10-CM

## 2024-09-06 NOTE — TELEPHONE ENCOUNTER
Received request via: Pharmacy    Was the patient seen in the last year in this department? Yes    Does the patient have an active prescription (recently filled or refills available) for medication(s) requested? No    [facility]     Does the patient have [facility] Plus and need 100-day supply? (This applies to ALL medications) Patient does not have SCP

## 2024-09-11 ENCOUNTER — APPOINTMENT (OUTPATIENT)
Dept: MEDICAL GROUP | Facility: MEDICAL CENTER | Age: 49
End: 2024-09-11
Payer: COMMERCIAL

## 2024-09-11 VITALS
HEIGHT: 70 IN | BODY MASS INDEX: 31.3 KG/M2 | WEIGHT: 218.6 LBS | SYSTOLIC BLOOD PRESSURE: 130 MMHG | DIASTOLIC BLOOD PRESSURE: 80 MMHG | HEART RATE: 80 BPM | TEMPERATURE: 98.5 F | OXYGEN SATURATION: 96 %

## 2024-09-11 DIAGNOSIS — F41.1 GAD (GENERALIZED ANXIETY DISORDER): ICD-10-CM

## 2024-09-11 DIAGNOSIS — I10 PRIMARY HYPERTENSION: ICD-10-CM

## 2024-09-11 DIAGNOSIS — K21.00 GASTROESOPHAGEAL REFLUX DISEASE WITH ESOPHAGITIS WITHOUT HEMORRHAGE: ICD-10-CM

## 2024-09-11 PROCEDURE — 99214 OFFICE O/P EST MOD 30 MIN: CPT | Performed by: NURSE PRACTITIONER

## 2024-09-11 PROCEDURE — 3075F SYST BP GE 130 - 139MM HG: CPT | Performed by: NURSE PRACTITIONER

## 2024-09-11 PROCEDURE — 3079F DIAST BP 80-89 MM HG: CPT | Performed by: NURSE PRACTITIONER

## 2024-09-11 RX ORDER — SUCRALFATE 1 G/1
1 TABLET ORAL
Qty: 120 TABLET | Refills: 0 | Status: SHIPPED | OUTPATIENT
Start: 2024-09-11

## 2024-09-11 ASSESSMENT — FIBROSIS 4 INDEX: FIB4 SCORE: 0.8

## 2024-09-13 DIAGNOSIS — N52.9 ERECTILE DYSFUNCTION, UNSPECIFIED ERECTILE DYSFUNCTION TYPE: ICD-10-CM

## 2024-09-13 NOTE — TELEPHONE ENCOUNTER
Received request via: Pharmacy    Was the patient seen in the last year in this department? Yes    Does the patient have an active prescription (recently filled or refills available) for medication(s) requested? yes    Pharmacy Name:   To be filled at: [facility]/pharmacy #[id number] - [place], [place] - [address]              Does the patient have [facility] Plus and need 100-day supply? (This applies to ALL medications) Patient does not have SCP  Requested Prescriptions     Pending Prescriptions Disp Refills    tadalafil 20 MG tablet [Pharmacy Med Name: TADALAFIL 20 MG TABLET] 6 Tablet 11     Sig: TAKE 1 TABLET BY MOUTH AS NEEDED FOR ERECTILE DYSFUNCTION

## 2024-09-15 RX ORDER — TADALAFIL 20 MG/1
20 TABLET ORAL PRN
Qty: 6 TABLET | Refills: 11 | Status: SHIPPED | OUTPATIENT
Start: 2024-09-15

## 2024-09-22 RX ORDER — OLMESARTAN MEDOXOMIL 5 MG/1
5 TABLET ORAL DAILY
Qty: 90 TABLET | Refills: 1 | Status: SHIPPED | OUTPATIENT
Start: 2024-09-22

## 2024-09-24 ENCOUNTER — HOSPITAL ENCOUNTER (OUTPATIENT)
Facility: MEDICAL CENTER | Age: 49
End: 2024-09-24
Attending: NURSE PRACTITIONER
Payer: COMMERCIAL

## 2024-09-25 DIAGNOSIS — K21.00 GASTROESOPHAGEAL REFLUX DISEASE WITH ESOPHAGITIS WITHOUT HEMORRHAGE: ICD-10-CM

## 2024-09-25 LAB — H PYLORI AG STL QL IA: NOT DETECTED

## 2024-10-14 DIAGNOSIS — K21.00 GASTROESOPHAGEAL REFLUX DISEASE WITH ESOPHAGITIS WITHOUT HEMORRHAGE: ICD-10-CM

## 2025-01-15 ENCOUNTER — APPOINTMENT (OUTPATIENT)
Dept: MEDICAL GROUP | Facility: MEDICAL CENTER | Age: 50
End: 2025-01-15
Payer: COMMERCIAL

## 2025-01-15 DIAGNOSIS — E55.9 VITAMIN D DEFICIENCY: ICD-10-CM

## 2025-01-15 DIAGNOSIS — I10 PRIMARY HYPERTENSION: ICD-10-CM

## 2025-01-15 DIAGNOSIS — E78.6 LOW HDL (UNDER 40): ICD-10-CM

## 2025-01-15 DIAGNOSIS — Z12.5 SCREENING FOR MALIGNANT NEOPLASM OF PROSTATE: ICD-10-CM

## 2025-01-15 DIAGNOSIS — Z13.0 SCREENING, ANEMIA, DEFICIENCY, IRON: ICD-10-CM

## 2025-02-03 ENCOUNTER — APPOINTMENT (OUTPATIENT)
Dept: MEDICAL GROUP | Facility: MEDICAL CENTER | Age: 50
End: 2025-02-03
Payer: COMMERCIAL

## 2025-02-03 VITALS
HEART RATE: 89 BPM | OXYGEN SATURATION: 98 % | WEIGHT: 230 LBS | SYSTOLIC BLOOD PRESSURE: 152 MMHG | TEMPERATURE: 98.5 F | DIASTOLIC BLOOD PRESSURE: 98 MMHG | BODY MASS INDEX: 32.93 KG/M2 | HEIGHT: 70 IN

## 2025-02-03 DIAGNOSIS — G89.29 CHRONIC PAIN IN LEFT SHOULDER: ICD-10-CM

## 2025-02-03 DIAGNOSIS — E66.811 OBESITY (BMI 30.0-34.9): ICD-10-CM

## 2025-02-03 DIAGNOSIS — I10 PRIMARY HYPERTENSION: ICD-10-CM

## 2025-02-03 DIAGNOSIS — M25.512 CHRONIC PAIN IN LEFT SHOULDER: ICD-10-CM

## 2025-02-03 DIAGNOSIS — K21.00 GASTROESOPHAGEAL REFLUX DISEASE WITH ESOPHAGITIS WITHOUT HEMORRHAGE: ICD-10-CM

## 2025-02-03 DIAGNOSIS — M25.50 ARTHRALGIA, UNSPECIFIED JOINT: ICD-10-CM

## 2025-02-03 DIAGNOSIS — F41.1 GAD (GENERALIZED ANXIETY DISORDER): ICD-10-CM

## 2025-02-03 PROCEDURE — 3077F SYST BP >= 140 MM HG: CPT | Performed by: NURSE PRACTITIONER

## 2025-02-03 PROCEDURE — 99214 OFFICE O/P EST MOD 30 MIN: CPT | Performed by: NURSE PRACTITIONER

## 2025-02-03 PROCEDURE — 3080F DIAST BP >= 90 MM HG: CPT | Performed by: NURSE PRACTITIONER

## 2025-02-03 RX ORDER — ESCITALOPRAM OXALATE 10 MG/1
10 TABLET ORAL EVERY MORNING
Qty: 90 TABLET | Refills: 0 | Status: SHIPPED | OUTPATIENT
Start: 2025-02-03 | End: 2025-03-05

## 2025-02-03 RX ORDER — SUCRALFATE 1 G/1
1 TABLET ORAL
Qty: 120 TABLET | Refills: 0 | Status: SHIPPED | OUTPATIENT
Start: 2025-02-03

## 2025-02-03 RX ORDER — OLMESARTAN MEDOXOMIL 5 MG/1
5 TABLET ORAL DAILY
Qty: 90 TABLET | Refills: 1 | Status: SHIPPED | OUTPATIENT
Start: 2025-02-03

## 2025-02-03 ASSESSMENT — FIBROSIS 4 INDEX: FIB4 SCORE: 0.8

## 2025-02-04 NOTE — PROGRESS NOTES
Subjective:     History of Present Illness  The patient presents for evaluation of hypertension.    He has been experiencing persistent shoulder pain for the past 2 years, which he attributes to a SLAP tear. An MRI of his shoulder revealed severe arthritis. He received an injection for this issue a few years ago, which provided temporary relief for a couple of months. However, the pain intensified following a COVID-19 infection 2 months later. A recent x-ray indicated signs of arthritis, a condition he did not have 2 years ago.  He has appt with ortho today for his shoulder pain.    He also reports tenderness in his left knee. His physical activity is limited due to his inability to walk on the treadmill for extended periods and lift weights due to his shoulder condition.     He has gained 30 pounds since starting Lexapro and is concerned about potential weight gain from dietary changes. He has been consuming cheese and crackers daily after work.    He reports that his blood pressure has not been well-controlled recently, with readings averaging around 130-140's/80-90's at home. He has been taking half a tablet of olmesartan 5 mg as previously instructed. He has not been recording his blood pressure readings daily. He received a 3-month prescription for olmesartan 5 mg in the past and still has some left. He has not consumed any coffee today and is experiencing a headache.  No s/e tomed.      He is currently on Lexapro 10 mg for anxiety, having reduced the dosage from 15 mg. He experiences nausea approximately 1 hour after taking the medication, which subsides upon eating. He has been taking hydroxyzine concurrently with his stomach medication. He has previously tried Cymbalta  but cant remember if had s/e with it. He reports a decrease in anxiety levels but continues to experience untriggered anxiety.     He is currently taking omeprazole for heartburn, which has been effective during the day. However, he  experiences acid reflux at night, resulting in a sensation of choking on acid in his throat and mouth. He has been taking Pepcid at night for the past week, which has been beneficial. He no longer has sucralfate, which he had taken for a month.  It was improving his sx.    Supplemental Information  He has been dealing with prostate infections since last year.    MEDICATIONS  Current: Lexapro, omeprazole, Pepcid, hydroxyzine  Discontinued: sucralfate, meloxicam      Results  - Imaging:    - MRI of shoulder shows severe arthritis and a SLAP tear      Current medicines (including changes today)  Current Outpatient Medications   Medication Sig Dispense Refill    olmesartan (BENICAR) 5 MG tablet Take 1 Tablet by mouth every day. 90 Tablet 1    escitalopram (LEXAPRO) 10 MG Tab Take 1 Tablet by mouth every morning for 30 days. 90 Tablet 0    sucralfate (CARAFATE) 1 GM Tab Take 1 Tablet by mouth 4 Times a Day,Before Meals and at Bedtime. 120 Tablet 0    methylPREDNISolone (MEDROL) 4 MG Tab TAKE 6 TABLETS ON DAY 1 AS DIRECTED ON PACKAGE AND DECREASE BY 1 TAB EACH DAY FOR A TOTAL OF 6 DAYS      Omeprazole 20 MG Tablet Delayed Release Dispersible Take 1 Capsule by mouth every day.      methylPREDNISolone (MEDROL DOSEPAK) 4 MG Tablet Therapy Pack TAKE 6 TABLETS ON DAY 1 AS DIRECTED ON PACKAGE AND DECREASE BY 1 TAB EACH DAY FOR A TOTAL OF 6 DAYS      escitalopram (LEXAPRO) 10 MG Tab Take 1 Tablet by mouth every morning.      meloxicam (MOBIC) 15 MG tablet TAKE 1 TABLET BY MOUTH EVERY DAY-WAIT TO TAKE UNTIL MEDROL DOSEPACK IS FINISHED      lisinopril (PRINIVIL) 5 MG Tab Take 1 Tablet by mouth every day.      Cetirizine HCl (ZYRTEC PO)       omeprazole (PRILOSEC) 20 MG delayed-release capsule TAKE 1 CAPSULE BY MOUTH EVERY DAY 90 Capsule 1    tadalafil 20 MG tablet TAKE 1 TABLET BY MOUTH AS NEEDED FOR ERECTILE DYSFUNCTION 6 Tablet 11    tamsulosin (FLOMAX) 0.4 MG capsule Take 1 capsule every day by oral route at bedtime for 90  days.      albuterol 108 (90 Base) MCG/ACT Aero Soln inhalation aerosol Inhale 2 Puffs every 6 hours as needed for Shortness of Breath. 8.5 g 0     No current facility-administered medications for this visit.     Current Outpatient Medications   Medication Sig Dispense Refill    olmesartan (BENICAR) 5 MG tablet Take 1 Tablet by mouth every day. 90 Tablet 1    escitalopram (LEXAPRO) 10 MG Tab Take 1 Tablet by mouth every morning for 30 days. 90 Tablet 0    sucralfate (CARAFATE) 1 GM Tab Take 1 Tablet by mouth 4 Times a Day,Before Meals and at Bedtime. 120 Tablet 0    methylPREDNISolone (MEDROL) 4 MG Tab TAKE 6 TABLETS ON DAY 1 AS DIRECTED ON PACKAGE AND DECREASE BY 1 TAB EACH DAY FOR A TOTAL OF 6 DAYS      Omeprazole 20 MG Tablet Delayed Release Dispersible Take 1 Capsule by mouth every day.      methylPREDNISolone (MEDROL DOSEPAK) 4 MG Tablet Therapy Pack TAKE 6 TABLETS ON DAY 1 AS DIRECTED ON PACKAGE AND DECREASE BY 1 TAB EACH DAY FOR A TOTAL OF 6 DAYS      escitalopram (LEXAPRO) 10 MG Tab Take 1 Tablet by mouth every morning.      meloxicam (MOBIC) 15 MG tablet TAKE 1 TABLET BY MOUTH EVERY DAY-WAIT TO TAKE UNTIL MEDROL DOSEPACK IS FINISHED      lisinopril (PRINIVIL) 5 MG Tab Take 1 Tablet by mouth every day.      Cetirizine HCl (ZYRTEC PO)       omeprazole (PRILOSEC) 20 MG delayed-release capsule TAKE 1 CAPSULE BY MOUTH EVERY DAY 90 Capsule 1    tadalafil 20 MG tablet TAKE 1 TABLET BY MOUTH AS NEEDED FOR ERECTILE DYSFUNCTION 6 Tablet 11    tamsulosin (FLOMAX) 0.4 MG capsule Take 1 capsule every day by oral route at bedtime for 90 days.      albuterol 108 (90 Base) MCG/ACT Aero Soln inhalation aerosol Inhale 2 Puffs every 6 hours as needed for Shortness of Breath. 8.5 g 0     No current facility-administered medications for this visit.       He  has a past medical history of Anxiety, ED (erectile dysfunction), Hypertension, LBP (low back pain), Meningitis, Post traumatic stress disorder (PTSD), and Recurrent  "sinusitis.      ROS   Review of Systems   Constitutional: Negative.  Negative for fever, chills, weight loss, malaise/fatigue and diaphoresis.   HENT: Negative.  Negative for hearing loss, ear pain, nosebleeds, congestion, sore throat, neck pain, tinnitus and ear discharge.    Respiratory: Negative.  Negative for cough, hemoptysis, sputum production, shortness of breath, wheezing and stridor.    Cardiovascular: Negative.  Negative for chest pain, palpitations, orthopnea, claudication, leg swelling and PND.   Gastrointestinal: denies nausea, vomiting, diarrhea, constipation, heartburn, melena or hematochezia.  Genitourinary: Denies dysuria, hematuria, urinary incontinence, frequency or urgency.    Musculoskeletal: Negative.  Negative for myalgias and back pain.   Neurological: Negative.  Negative for dizziness, tingling, tremors, weakness and headaches.   Psych:  Denies depression, anxiety or insomnia.  All other systems reviewed and are negative.       Objective:     BP (!) 152/98   Pulse 89   Temp 36.9 °C (98.5 °F) (Temporal)   Ht 1.778 m (5' 10\")   Wt 104 kg (230 lb)   SpO2 98%  Body mass index is 33 kg/m².   Physical Exam  Lungs were auscultated.  Physical Exam   Vitals reviewed.  Constitutional: oriented to person, place, and time. appears well-developed and well-nourished. No distress.   Neck: No JVD present.  Cardiovascular: Normal rate, regular rhythm, normal heart sounds and intact distal pulses.  Exam reveals no gallop and no friction rub.  No murmur heard.  No carotid bruits.   Pulmonary/Chest: Effort normal and breath sounds normal. No stridor. No respiratory distress. no wheezes or rales. exhibits no tenderness.   Musculoskeletal: Normal range of motion. exhibits no edema. otonile pedal pulses 2+.  Lymphadenopathy: no cervical or supraclavicular adenopathy.   Neurological: alert and oriented to person, place, and time. exhibits normal muscle tone. Coordination normal.   Skin: Skin is warm and dry. no " diaphoresis.   Psychiatric: normal mood and affect. behavior is normal.       Assessment and Plan:   The following treatment plan was discussed    Assessment & Plan  1. Arthralgias and chronic left shoulder pain  The patient's symptoms are indicative of regular osteoarthriti. Weight gain could be contributing to the arthritis. A diet rich in protein and vegetables, with reduced carbohydrate intake, is recommended to alleviate fatigue and body aches. This dietary change may also facilitate weight loss, potentially improving foot health. An LYUDMILA test with reflex will be ordered to further investigate the cause of the symptoms.    2. Hypertension with obesity.  Blood pressure readings have been consistently elevated, averaging around 140 systolic. The dosage of olmesartan will be increased from half a tablet of 5 mg to a full tablet of 5 mg. He is advised to monitor his blood pressure daily and record the readings for a month. A follow-up appointment will be scheduled in 6 weeks.  Enc pt to exercise regularly and eat healhty diet.      3. Anxiety.  He will continue with the current dosage of Lexapro 10 mg. A refill for Lexapro 10 mg has been provided.    4. GERD  He will continue with omeprazole once daily and Pepcid at night. Sucralfate will be added to the regimen for another month, to be taken by dissolving in 30 mL of water and drinking. If the current treatment does not work, then he may need to switch to omeprazole twice a day.    Follow-up  The patient will follow up in 6 weeks for BP check and lab review.    PROCEDURE  The patient received an injection for shoulder pain a few years ago, which provided temporary relief for a couple of months.      ORDERS:  1. Arthralgia, unspecified joint    - LYUDMILA REFLEXIVE PROFILE; Future    2. Chronic pain in left shoulder    - LYUDMILA REFLEXIVE PROFILE; Future    3. TULIO (generalized anxiety disorder)    - olmesartan (BENICAR) 5 MG tablet; Take 1 Tablet by mouth every day.   Dispense: 90 Tablet; Refill: 1  - escitalopram (LEXAPRO) 10 MG Tab; Take 1 Tablet by mouth every morning for 30 days.  Dispense: 90 Tablet; Refill: 0    4. Psychosocial stressors    - escitalopram (LEXAPRO) 10 MG Tab; Take 1 Tablet by mouth every morning for 30 days.  Dispense: 90 Tablet; Refill: 0    5. Gastroesophageal reflux disease with esophagitis without hemorrhage    - sucralfate (CARAFATE) 1 GM Tab; Take 1 Tablet by mouth 4 Times a Day,Before Meals and at Bedtime.  Dispense: 120 Tablet; Refill: 0        Please note that this dictation was created using voice recognition software. I have made every reasonable attempt to correct obvious errors, but I expect that there are errors of grammar and possibly content that I did not discover before finalizing the note.      Attestation      Verbal consent was acquired by the patient to use [email or web address] ambient listening note generation during this visit Yes

## 2025-02-27 DIAGNOSIS — K21.00 GASTROESOPHAGEAL REFLUX DISEASE WITH ESOPHAGITIS WITHOUT HEMORRHAGE: ICD-10-CM

## 2025-02-27 RX ORDER — OMEPRAZOLE 20 MG/1
20 CAPSULE, DELAYED RELEASE ORAL DAILY
Qty: 90 CAPSULE | Refills: 3 | Status: SHIPPED | OUTPATIENT
Start: 2025-02-27

## 2025-02-28 DIAGNOSIS — F41.1 GAD (GENERALIZED ANXIETY DISORDER): ICD-10-CM

## 2025-02-28 DIAGNOSIS — Z65.8 PSYCHOSOCIAL STRESSORS: ICD-10-CM

## 2025-03-02 RX ORDER — ESCITALOPRAM OXALATE 10 MG/1
TABLET ORAL
Qty: 135 TABLET | Refills: 3 | Status: SHIPPED | OUTPATIENT
Start: 2025-03-02 | End: 2025-03-17

## 2025-03-11 ENCOUNTER — HOSPITAL ENCOUNTER (OUTPATIENT)
Facility: MEDICAL CENTER | Age: 50
End: 2025-03-11
Attending: NURSE PRACTITIONER
Payer: COMMERCIAL

## 2025-03-11 ENCOUNTER — RESULTS FOLLOW-UP (OUTPATIENT)
Dept: MEDICAL GROUP | Facility: MEDICAL CENTER | Age: 50
End: 2025-03-11
Payer: COMMERCIAL

## 2025-03-11 DIAGNOSIS — G89.29 CHRONIC PAIN IN LEFT SHOULDER: ICD-10-CM

## 2025-03-11 DIAGNOSIS — M25.50 ARTHRALGIA, UNSPECIFIED JOINT: ICD-10-CM

## 2025-03-11 DIAGNOSIS — E78.6 LOW HDL (UNDER 40): ICD-10-CM

## 2025-03-11 DIAGNOSIS — E55.9 VITAMIN D DEFICIENCY: ICD-10-CM

## 2025-03-11 DIAGNOSIS — Z13.0 SCREENING, ANEMIA, DEFICIENCY, IRON: ICD-10-CM

## 2025-03-11 DIAGNOSIS — M25.512 CHRONIC PAIN IN LEFT SHOULDER: ICD-10-CM

## 2025-03-11 DIAGNOSIS — I10 PRIMARY HYPERTENSION: ICD-10-CM

## 2025-03-11 LAB
25(OH)D3 SERPL-MCNC: 27 NG/ML (ref 30–100)
ALBUMIN SERPL BCP-MCNC: 4.2 G/DL (ref 3.2–4.9)
ALBUMIN/GLOB SERPL: 1.8 G/DL
ALP SERPL-CCNC: 46 U/L (ref 30–99)
ALT SERPL-CCNC: 64 U/L (ref 2–50)
ANION GAP SERPL CALC-SCNC: 11 MMOL/L (ref 7–16)
AST SERPL-CCNC: 30 U/L (ref 12–45)
BASOPHILS # BLD AUTO: 1.9 % (ref 0–1.8)
BASOPHILS # BLD: 0.07 K/UL (ref 0–0.12)
BILIRUB SERPL-MCNC: 0.4 MG/DL (ref 0.1–1.5)
BUN SERPL-MCNC: 20 MG/DL (ref 8–22)
CALCIUM ALBUM COR SERPL-MCNC: 9 MG/DL (ref 8.5–10.5)
CALCIUM SERPL-MCNC: 9.2 MG/DL (ref 8.4–10.2)
CHLORIDE SERPL-SCNC: 107 MMOL/L (ref 96–112)
CHOLEST SERPL-MCNC: 125 MG/DL (ref 100–199)
CO2 SERPL-SCNC: 22 MMOL/L (ref 20–33)
CREAT SERPL-MCNC: 1 MG/DL (ref 0.5–1.4)
EOSINOPHIL # BLD AUTO: 0.1 K/UL (ref 0–0.51)
EOSINOPHIL NFR BLD: 2.7 % (ref 0–6.9)
ERYTHROCYTE [DISTWIDTH] IN BLOOD BY AUTOMATED COUNT: 42.1 FL (ref 35.9–50)
GFR SERPLBLD CREATININE-BSD FMLA CKD-EPI: 93 ML/MIN/1.73 M 2
GLOBULIN SER CALC-MCNC: 2.3 G/DL (ref 1.9–3.5)
GLUCOSE SERPL-MCNC: 100 MG/DL (ref 65–99)
HCT VFR BLD AUTO: 41.9 % (ref 42–52)
HDLC SERPL-MCNC: 32 MG/DL
HGB BLD-MCNC: 14.4 G/DL (ref 14–18)
IMM GRANULOCYTES # BLD AUTO: 0.01 K/UL (ref 0–0.11)
IMM GRANULOCYTES NFR BLD AUTO: 0.3 % (ref 0–0.9)
LDLC SERPL CALC-MCNC: 66 MG/DL
LYMPHOCYTES # BLD AUTO: 1.28 K/UL (ref 1–4.8)
LYMPHOCYTES NFR BLD: 34 % (ref 22–41)
MCH RBC QN AUTO: 30.8 PG (ref 27–33)
MCHC RBC AUTO-ENTMCNC: 34.4 G/DL (ref 32.3–36.5)
MCV RBC AUTO: 89.5 FL (ref 81.4–97.8)
MONOCYTES # BLD AUTO: 0.45 K/UL (ref 0–0.85)
MONOCYTES NFR BLD AUTO: 11.9 % (ref 0–13.4)
NEUTROPHILS # BLD AUTO: 1.86 K/UL (ref 1.82–7.42)
NEUTROPHILS NFR BLD: 49.2 % (ref 44–72)
NRBC # BLD AUTO: 0 K/UL
NRBC BLD-RTO: 0 /100 WBC (ref 0–0.2)
PLATELET # BLD AUTO: 199 K/UL (ref 164–446)
PMV BLD AUTO: 9.3 FL (ref 9–12.9)
POTASSIUM SERPL-SCNC: 4.3 MMOL/L (ref 3.6–5.5)
PROT SERPL-MCNC: 6.5 G/DL (ref 6–8.2)
RBC # BLD AUTO: 4.68 M/UL (ref 4.7–6.1)
SODIUM SERPL-SCNC: 140 MMOL/L (ref 135–145)
TRIGL SERPL-MCNC: 137 MG/DL (ref 0–149)
WBC # BLD AUTO: 3.8 K/UL (ref 4.8–10.8)

## 2025-03-11 PROCEDURE — 80061 LIPID PANEL: CPT

## 2025-03-11 PROCEDURE — 82306 VITAMIN D 25 HYDROXY: CPT

## 2025-03-11 PROCEDURE — 86038 ANTINUCLEAR ANTIBODIES: CPT

## 2025-03-11 PROCEDURE — 86039 ANTINUCLEAR ANTIBODIES (ANA): CPT

## 2025-03-11 PROCEDURE — 82570 ASSAY OF URINE CREATININE: CPT

## 2025-03-11 PROCEDURE — 82043 UR ALBUMIN QUANTITATIVE: CPT

## 2025-03-11 PROCEDURE — 36415 COLL VENOUS BLD VENIPUNCTURE: CPT

## 2025-03-11 PROCEDURE — 80053 COMPREHEN METABOLIC PANEL: CPT

## 2025-03-11 PROCEDURE — 85025 COMPLETE CBC W/AUTO DIFF WBC: CPT

## 2025-03-12 LAB
CREAT UR-MCNC: 131 MG/DL
MICROALBUMIN UR-MCNC: <1.2 MG/DL
MICROALBUMIN/CREAT UR: NORMAL MG/G (ref 0–30)

## 2025-03-13 LAB — NUCLEAR IGG SER QL IA: DETECTED

## 2025-03-15 LAB
ANA PAT SER IF-IMP: NORMAL
NUCLEAR IGG SER QL IF: NORMAL

## 2025-03-17 ENCOUNTER — OFFICE VISIT (OUTPATIENT)
Dept: MEDICAL GROUP | Facility: MEDICAL CENTER | Age: 50
End: 2025-03-17
Payer: COMMERCIAL

## 2025-03-17 ENCOUNTER — HOSPITAL ENCOUNTER (OUTPATIENT)
Facility: MEDICAL CENTER | Age: 50
End: 2025-03-17
Attending: NURSE PRACTITIONER
Payer: COMMERCIAL

## 2025-03-17 VITALS
HEIGHT: 69 IN | DIASTOLIC BLOOD PRESSURE: 60 MMHG | OXYGEN SATURATION: 99 % | SYSTOLIC BLOOD PRESSURE: 160 MMHG | TEMPERATURE: 98 F | WEIGHT: 231.48 LBS | BODY MASS INDEX: 34.29 KG/M2 | HEART RATE: 97 BPM

## 2025-03-17 DIAGNOSIS — R79.89 ELEVATED LFTS: ICD-10-CM

## 2025-03-17 DIAGNOSIS — D72.9 ABNORMAL WHITE BLOOD CELL (WBC): ICD-10-CM

## 2025-03-17 DIAGNOSIS — I10 PRIMARY HYPERTENSION: ICD-10-CM

## 2025-03-17 DIAGNOSIS — E55.9 VITAMIN D DEFICIENCY: ICD-10-CM

## 2025-03-17 DIAGNOSIS — R76.8 POSITIVE ANA (ANTINUCLEAR ANTIBODY): ICD-10-CM

## 2025-03-17 DIAGNOSIS — F41.1 GAD (GENERALIZED ANXIETY DISORDER): ICD-10-CM

## 2025-03-17 DIAGNOSIS — E78.6 LOW HDL (UNDER 40): ICD-10-CM

## 2025-03-17 DIAGNOSIS — G89.29 CHRONIC LEFT SHOULDER PAIN: ICD-10-CM

## 2025-03-17 DIAGNOSIS — M25.512 CHRONIC LEFT SHOULDER PAIN: ICD-10-CM

## 2025-03-17 PROCEDURE — 80307 DRUG TEST PRSMV CHEM ANLYZR: CPT

## 2025-03-17 PROCEDURE — 99214 OFFICE O/P EST MOD 30 MIN: CPT | Performed by: NURSE PRACTITIONER

## 2025-03-17 PROCEDURE — 3077F SYST BP >= 140 MM HG: CPT | Performed by: NURSE PRACTITIONER

## 2025-03-17 PROCEDURE — 3078F DIAST BP <80 MM HG: CPT | Performed by: NURSE PRACTITIONER

## 2025-03-17 RX ORDER — ALPRAZOLAM 0.25 MG
0.25 TABLET ORAL NIGHTLY PRN
Qty: 10 TABLET | Refills: 0 | Status: SHIPPED | OUTPATIENT
Start: 2025-03-17 | End: 2025-04-16

## 2025-03-17 ASSESSMENT — FIBROSIS 4 INDEX: FIB4 SCORE: 0.89

## 2025-03-17 NOTE — PROGRESS NOTES
Subjective:     History of Present Illness  The patient is a 49-year-old male who presents for follow-up for anxiety.    He has been experiencing left shoulder pain, which has progressively worsened despite receiving a cortisone injection. He has an appointment with his orthopedic doctor after this visit.    He expresses a desire to discontinue Lexapro, attributing it to his depressive symptoms, weight gain, and decreased libido. He also reports difficulty in initiating daily activities, including exercise. He is considering Xanax as an alternative for situational stressors. He has abstained from marijuana use since 10/2023. He is currently on a 5 mg dose of Lexapro, which he occasionally forgets to take, leading to an unintentional weaning off the medication. He identifies psychosocial stressors as significant triggers for his anxiety.    He has a history of recurrent skin irritation and redness on his glands, for which he was prescribed triamcinolone. He also reports a rash on both cheeks a few weeks ago and another on his leg a few days ago, which was associated with itching. He has been applying an anti-itch cream and nystatin for relief.  He is concerned that the +LYUDMILA is r/t this sx    He has been supplementing with vitamin D at a dose of 5000 units daily. He reports that his vitamin D levels normalize during the summer when he engages in daily hiking. He prefers to have his blood work done in 3 months, during the summer, when he is off antidepressants and has resumed weightlifting.    He has been on blood pressure medication, which he is willing to continue. He reports no changes in his dietary habits but notes weight gain.  SBP remains elevated but he feels it is r/t office visit and his anxiety.  Not checking regularly at home. Will start monitoring at home    Supplemental Information  He has been experiencing foot pain, which has shown improvement. He has been on prednisone for his foot condition. He  recently completed a 7-mile hike, after which he experienced mild soreness in both feet, but noted a significant improvement in his right foot. He has received 2 cortisone injections in his heel and is hopeful for a resolution of his foot issues.    SOCIAL HISTORY  He stopped using marijuana in October 2023.    FAMILY HISTORY  He does not have a family history of rheumatoid arthritis or lupus. His father has a fatty liver.    MEDICATIONS  Current: Lexapro, Augmentin, meloxicam, triamcinolone, nystatin, vitamin D      Results  - Laboratory Studies:    - LYUDMILA: positive, less than 1:80    - GFR, CMP, alb/cr ratio is wnl except SGPT mildly elevated at 64, up from 53.     - vitamin d low at 27 despite 5000 units daily supplement    - LP shows trg and LDL at goal.  HDL chronic low in 30's    - CBC shows hct decreased, wbc low and basos high. No current sx of infection        Current medicines (including changes today)  Current Outpatient Medications   Medication Sig Dispense Refill    ALPRAZolam (XANAX) 0.25 MG Tab Take 1 Tablet by mouth at bedtime as needed for Sleep for up to 30 days. 10 Tablet 0    AUGMENTIN 500-125 MG Tab Take 1 tablet every 12 hours by oral route for 14 days.      olmesartan (BENICAR) 5 MG tablet Take 1 Tablet by mouth every day.      sucralfate (CARAFATE) 1 GM Tab Take 1 Tablet by mouth 4 Times a Day,Before Meals and at Bedtime.      omeprazole (PRILOSEC) 20 MG delayed-release capsule TAKE 1 CAPSULE BY MOUTH EVERY DAY 90 Capsule 3    olmesartan (BENICAR) 5 MG tablet Take 1 Tablet by mouth every day. 90 Tablet 1    sucralfate (CARAFATE) 1 GM Tab Take 1 Tablet by mouth 4 Times a Day,Before Meals and at Bedtime. 120 Tablet 0    methylPREDNISolone (MEDROL) 4 MG Tab TAKE 6 TABLETS ON DAY 1 AS DIRECTED ON PACKAGE AND DECREASE BY 1 TAB EACH DAY FOR A TOTAL OF 6 DAYS      Omeprazole 20 MG Tablet Delayed Release Dispersible Take 1 Capsule by mouth every day.      methylPREDNISolone (MEDROL DOSEPAK) 4 MG  Tablet Therapy Pack TAKE 6 TABLETS ON DAY 1 AS DIRECTED ON PACKAGE AND DECREASE BY 1 TAB EACH DAY FOR A TOTAL OF 6 DAYS      meloxicam (MOBIC) 15 MG tablet TAKE 1 TABLET BY MOUTH EVERY DAY-WAIT TO TAKE UNTIL MEDROL DOSEPACK IS FINISHED      lisinopril (PRINIVIL) 5 MG Tab Take 1 Tablet by mouth every day.      Cetirizine HCl (ZYRTEC PO)       tadalafil 20 MG tablet TAKE 1 TABLET BY MOUTH AS NEEDED FOR ERECTILE DYSFUNCTION 6 Tablet 11    tamsulosin (FLOMAX) 0.4 MG capsule Take 1 capsule every day by oral route at bedtime for 90 days.      albuterol 108 (90 Base) MCG/ACT Aero Soln inhalation aerosol Inhale 2 Puffs every 6 hours as needed for Shortness of Breath. 8.5 g 0     No current facility-administered medications for this visit.     Current Outpatient Medications   Medication Sig Dispense Refill    ALPRAZolam (XANAX) 0.25 MG Tab Take 1 Tablet by mouth at bedtime as needed for Sleep for up to 30 days. 10 Tablet 0    AUGMENTIN 500-125 MG Tab Take 1 tablet every 12 hours by oral route for 14 days.      olmesartan (BENICAR) 5 MG tablet Take 1 Tablet by mouth every day.      sucralfate (CARAFATE) 1 GM Tab Take 1 Tablet by mouth 4 Times a Day,Before Meals and at Bedtime.      omeprazole (PRILOSEC) 20 MG delayed-release capsule TAKE 1 CAPSULE BY MOUTH EVERY DAY 90 Capsule 3    olmesartan (BENICAR) 5 MG tablet Take 1 Tablet by mouth every day. 90 Tablet 1    sucralfate (CARAFATE) 1 GM Tab Take 1 Tablet by mouth 4 Times a Day,Before Meals and at Bedtime. 120 Tablet 0    methylPREDNISolone (MEDROL) 4 MG Tab TAKE 6 TABLETS ON DAY 1 AS DIRECTED ON PACKAGE AND DECREASE BY 1 TAB EACH DAY FOR A TOTAL OF 6 DAYS      Omeprazole 20 MG Tablet Delayed Release Dispersible Take 1 Capsule by mouth every day.      methylPREDNISolone (MEDROL DOSEPAK) 4 MG Tablet Therapy Pack TAKE 6 TABLETS ON DAY 1 AS DIRECTED ON PACKAGE AND DECREASE BY 1 TAB EACH DAY FOR A TOTAL OF 6 DAYS      meloxicam (MOBIC) 15 MG tablet TAKE 1 TABLET BY MOUTH EVERY  "DAY-WAIT TO TAKE UNTIL MEDROL DOSEPACK IS FINISHED      lisinopril (PRINIVIL) 5 MG Tab Take 1 Tablet by mouth every day.      Cetirizine HCl (ZYRTEC PO)       tadalafil 20 MG tablet TAKE 1 TABLET BY MOUTH AS NEEDED FOR ERECTILE DYSFUNCTION 6 Tablet 11    tamsulosin (FLOMAX) 0.4 MG capsule Take 1 capsule every day by oral route at bedtime for 90 days.      albuterol 108 (90 Base) MCG/ACT Aero Soln inhalation aerosol Inhale 2 Puffs every 6 hours as needed for Shortness of Breath. 8.5 g 0     No current facility-administered medications for this visit.       He  has a past medical history of Anxiety, ED (erectile dysfunction), Hypertension, LBP (low back pain), Meningitis, Post traumatic stress disorder (PTSD), and Recurrent sinusitis.    Hemoglobin A1c:  No results found for: \"HBA1C\"    Microalbumin/creatinine Ratio:  Lab Results   Component Value Date/Time    URCREAT 131.00 03/11/2025 1146    MICROALBUR <1.2 03/11/2025 1146    MALBCRT see below 03/11/2025 1146       Lipid Panel:  Lab Results   Component Value Date/Time    CHOLSTRLTOT 125 03/11/2025 1147    TRIGLYCERIDE 137 03/11/2025 1147    LDL 66 03/11/2025 1147       Complete Blood Count:  Lab Results   Component Value Date/Time    WBC 3.8 (L) 03/11/2025 1147    RBC 4.68 (L) 03/11/2025 1147    HEMOGLOBIN 14.4 03/11/2025 1147    HEMATOCRIT 41.9 (L) 03/11/2025 1147    MCV 89.5 03/11/2025 1147    MCH 30.8 03/11/2025 1147    MCHC 34.4 03/11/2025 1147    RDW 42.1 03/11/2025 1147    MPV 9.3 03/11/2025 1147    LYMPHOCYTES 34.00 03/11/2025 1147    LYMPHS 1.28 03/11/2025 1147    MONOCYTES 11.90 03/11/2025 1147    MONOS 0.45 03/11/2025 1147    EOSINOPHILS 2.70 03/11/2025 1147    EOS 0.10 03/11/2025 1147    BASOPHILS 1.90 (H) 03/11/2025 1147    BASO 0.07 03/11/2025 1147    NRBC 0.00 03/11/2025 1147       Complete Metabolic Panel:  Lab Results   Component Value Date/Time    SODIUM 140 03/11/2025 11:47 AM    POTASSIUM 4.3 03/11/2025 11:47 AM    CHLORIDE 107 03/11/2025 11:47 AM " "   CO2 22 03/11/2025 11:47 AM    ANION 11.0 03/11/2025 11:47 AM    GLUCOSE 100 (H) 03/11/2025 11:47 AM    BUN 20 03/11/2025 11:47 AM    CREATININE 1.00 03/11/2025 11:47 AM    ASTSGOT 30 03/11/2025 11:47 AM    ALTSGPT 64 (H) 03/11/2025 11:47 AM    TBILIRUBIN 0.4 03/11/2025 11:47 AM    ALBUMIN 4.2 03/11/2025 11:47 AM    TOTPROTEIN 6.5 03/11/2025 11:47 AM    GLOBULIN 2.3 03/11/2025 11:47 AM    AGRATIO 1.8 03/11/2025 11:47 AM         Vitamin D:    Lab Results   Component Value Date/Time    25HYDROXY 27 (L) 03/11/2025 1147       GFR:    Lab Results   Component Value Date/Time    GFRCKD 93 03/11/2025 1147         ROS   Review of Systems   Constitutional: Negative.  Negative for fever, chills, weight loss, malaise/fatigue and diaphoresis.   HENT: Negative.  Negative for hearing loss, ear pain, nosebleeds, congestion, sore throat, neck pain, tinnitus and ear discharge.    Respiratory: Negative.  Negative for cough, hemoptysis, sputum production, shortness of breath, wheezing and stridor.    Cardiovascular: Negative.  Negative for chest pain, palpitations, orthopnea, claudication, leg swelling and PND.   Gastrointestinal: denies nausea, vomiting, diarrhea, constipation, heartburn, melena or hematochezia.  Genitourinary: Denies dysuria, hematuria, urinary incontinence, frequency or urgency.    Musculoskeletal: Negative.  Negative for myalgias and back pain.   Neurological: Negative.  Negative for dizziness, tingling, tremors, weakness and headaches.   Psych:  Denies depression, insomnia.  All other systems reviewed and are negative.       Objective:     BP (!) 160/60   Pulse 97   Temp 36.7 °C (98 °F) (Temporal)   Ht 1.76 m (5' 9.29\")   Wt 105 kg (231 lb 7.7 oz)   SpO2 99%  Body mass index is 33.9 kg/m².   Physical Exam  Lungs were auscultated.  Physical Exam   Vitals reviewed.  Constitutional: oriented to person, place, and time. appears well-developed and well-nourished. No distress.   Neck: No JVD " present.  Cardiovascular: Normal rate, regular rhythm, normal heart sounds and intact distal pulses.  Exam reveals no gallop and no friction rub.  No murmur heard.  No carotid bruits.   Pulmonary/Chest: Effort normal and breath sounds normal. No stridor. No respiratory distress. no wheezes or rales. exhibits no tenderness.   Musculoskeletal: Normal range of motion. exhibits no edema. otoniel pedal pulses 2+.  Lymphadenopathy: no cervical or supraclavicular adenopathy.   Neurological: alert and oriented to person, place, and time. exhibits normal muscle tone. Coordination normal.   Skin: Skin is warm and dry. no diaphoresis.   Psychiatric: normal mood and affect. behavior is normal.       Assessment and Plan:   The following treatment plan was discussed    Assessment & Plan  1. Positive LYUDMILA.  His LYUDMILA levels are marginally elevated, indicating a potential autoimmune condition. However, the significance of this finding remains uncertain. His thyroid function has consistently been within normal limits over the past 15 years. A comprehensive panel of tests will be ordered, including TSH, T4, CCP, rheumatoid arthritis factor, C3, and C4 complements. These tests will help in further evaluating the potential autoimmune conditions.    2. Situational anxiety.  He will be provided with a prescription for Xanax, limited to 10 tablets per month, to be used for situational anxiety. A urine drug screen and a controlled substance agreement will be required.  He will gradually reduce his Lexapro dosage, initially taking it every other day for a week, then every 3 days for another week, before discontinuing it completely. He will monitor his symptoms and report any changes.    3. Prostate infection.  He is currently being treated with Augmentin and meloxicam for a prostate infection. A PRC test has been conducted to confirm the bacterial cause. He will continue the current antibiotic regimen.    4. Rash.  He has been experiencing rashes  on his cheeks and legs, which appear to be ringworm. He will continue using nystatin cream for treatment.    5. Elevated liver function tests.  His liver function tests have shown some abnormalities, which may be related to family history of fatty liver. A hepatic function panel will be ordered to further evaluate liver health.    6. HTN  His blood pressure is not well-controlled despite being on medication. He will continue his current blood pressure medication regimen.    7.  Preventative health  He will do updated lab at next appt in 6 months with CBC, CMP, CCP, C3,C4, TSH, T4, RA factor, vitamin D.     PROCEDURE  The patient received a cortisone injection in the shoulder, which has progressively worsened his pain. He also received 2 cortisone injections in his heel.      ORDERS:  1. TULIO (generalized anxiety disorder)    - ALPRAZolam (XANAX) 0.25 MG Tab; Take 1 Tablet by mouth at bedtime as needed for Sleep for up to 30 days.  Dispense: 10 Tablet; Refill: 0  - URINE DRUG SCREEN; Future  - Controlled Substance Treatment Agreement    2. Positive LYUDMILA (antinuclear antibody)    - TSH; Future  - FREE THYROXINE; Future  - CCP  - RHEUMATOID ARTHRITIS FACTOR; Future  - COMPLEMENT C3+C4 SERUM; Future    3. Vitamin D deficiency    - VITAMIN D,25 HYDROXY (DEFICIENCY); Future    4. Abnormal white blood cell (WBC)    - CBC WITH DIFFERENTIAL    5. Elevated LFTs    - Comp Metabolic Panel; Future        Please note that this dictation was created using voice recognition software. I have made every reasonable attempt to correct obvious errors, but I expect that there are errors of grammar and possibly content that I did not discover before finalizing the note.      Attestation      Verbal consent was acquired by the patient to use [employer] ambient listening note generation during this visit Yes

## 2025-03-19 LAB
AMPHET CTO UR CFM-MCNC: NEGATIVE NG/ML
BARBITURATES CTO UR CFM-MCNC: NEGATIVE NG/ML
BENZODIAZ CTO UR CFM-MCNC: NEGATIVE NG/ML
CANNABINOIDS CTO UR CFM-MCNC: NEGATIVE NG/ML
COCAINE CTO UR CFM-MCNC: NEGATIVE NG/ML
CREAT UR-MCNC: 105 MG/DL (ref 20–400)
DRUG COMMENT 753798: NORMAL
METHADONE CTO UR CFM-MCNC: NEGATIVE NG/ML
OPIATES CTO UR CFM-MCNC: NEGATIVE NG/ML
PCP CTO UR CFM-MCNC: NEGATIVE NG/ML
PROPOXYPH CTO UR CFM-MCNC: NEGATIVE NG/ML

## 2025-03-20 ENCOUNTER — HOSPITAL ENCOUNTER (OUTPATIENT)
Dept: LAB | Facility: MEDICAL CENTER | Age: 50
End: 2025-03-20
Attending: NURSE PRACTITIONER
Payer: COMMERCIAL

## 2025-03-20 DIAGNOSIS — R76.8 POSITIVE ANA (ANTINUCLEAR ANTIBODY): ICD-10-CM

## 2025-03-20 DIAGNOSIS — R79.89 ELEVATED LFTS: ICD-10-CM

## 2025-03-20 DIAGNOSIS — E55.9 VITAMIN D DEFICIENCY: ICD-10-CM

## 2025-03-20 DIAGNOSIS — F41.1 GAD (GENERALIZED ANXIETY DISORDER): ICD-10-CM

## 2025-03-20 PROCEDURE — 80053 COMPREHEN METABOLIC PANEL: CPT

## 2025-03-20 PROCEDURE — 82306 VITAMIN D 25 HYDROXY: CPT

## 2025-03-20 PROCEDURE — 84443 ASSAY THYROID STIM HORMONE: CPT

## 2025-03-20 PROCEDURE — 86160 COMPLEMENT ANTIGEN: CPT | Mod: 91

## 2025-03-20 PROCEDURE — 86200 CCP ANTIBODY: CPT

## 2025-03-20 PROCEDURE — 84439 ASSAY OF FREE THYROXINE: CPT

## 2025-03-20 PROCEDURE — 36415 COLL VENOUS BLD VENIPUNCTURE: CPT

## 2025-03-20 PROCEDURE — 86431 RHEUMATOID FACTOR QUANT: CPT

## 2025-03-21 LAB
25(OH)D3 SERPL-MCNC: 28 NG/ML (ref 30–100)
ALBUMIN SERPL BCP-MCNC: 4.2 G/DL (ref 3.2–4.9)
ALBUMIN/GLOB SERPL: 1.6 G/DL
ALP SERPL-CCNC: 44 U/L (ref 30–99)
ALT SERPL-CCNC: 49 U/L (ref 2–50)
ANION GAP SERPL CALC-SCNC: 12 MMOL/L (ref 7–16)
AST SERPL-CCNC: 21 U/L (ref 12–45)
BILIRUB SERPL-MCNC: 0.6 MG/DL (ref 0.1–1.5)
BUN SERPL-MCNC: 20 MG/DL (ref 8–22)
C3 SERPL-MCNC: 145 MG/DL (ref 87–200)
C4 SERPL-MCNC: 20.2 MG/DL (ref 19–52)
CALCIUM ALBUM COR SERPL-MCNC: 9.2 MG/DL (ref 8.5–10.5)
CALCIUM SERPL-MCNC: 9.4 MG/DL (ref 8.5–10.5)
CHLORIDE SERPL-SCNC: 106 MMOL/L (ref 96–112)
CO2 SERPL-SCNC: 20 MMOL/L (ref 20–33)
CREAT SERPL-MCNC: 1.06 MG/DL (ref 0.5–1.4)
GFR SERPLBLD CREATININE-BSD FMLA CKD-EPI: 87 ML/MIN/1.73 M 2
GLOBULIN SER CALC-MCNC: 2.7 G/DL (ref 1.9–3.5)
GLUCOSE SERPL-MCNC: 107 MG/DL (ref 65–99)
POTASSIUM SERPL-SCNC: 4.1 MMOL/L (ref 3.6–5.5)
PROT SERPL-MCNC: 6.9 G/DL (ref 6–8.2)
RHEUMATOID FACT SER IA-ACNC: <10 IU/ML (ref 0–14)
SODIUM SERPL-SCNC: 138 MMOL/L (ref 135–145)
T4 FREE SERPL-MCNC: 1.25 NG/DL (ref 0.93–1.7)
TSH SERPL-ACNC: 1.9 UIU/ML (ref 0.35–5.5)

## 2025-03-22 LAB — CCP IGA+IGG SERPL IA-ACNC: 2 UNITS (ref 0–19)

## 2025-03-23 ENCOUNTER — RESULTS FOLLOW-UP (OUTPATIENT)
Dept: MEDICAL GROUP | Facility: MEDICAL CENTER | Age: 50
End: 2025-03-23

## 2025-03-25 ENCOUNTER — OFFICE VISIT (OUTPATIENT)
Dept: MEDICAL GROUP | Facility: MEDICAL CENTER | Age: 50
End: 2025-03-25
Payer: COMMERCIAL

## 2025-03-25 VITALS
BODY MASS INDEX: 32.82 KG/M2 | HEART RATE: 87 BPM | HEIGHT: 70 IN | SYSTOLIC BLOOD PRESSURE: 132 MMHG | OXYGEN SATURATION: 98 % | WEIGHT: 229.28 LBS | TEMPERATURE: 98.6 F | DIASTOLIC BLOOD PRESSURE: 82 MMHG

## 2025-03-25 DIAGNOSIS — A49.8 GARDNERELLA INFECTION: ICD-10-CM

## 2025-03-25 DIAGNOSIS — R10.30 LOWER ABDOMINAL PAIN: ICD-10-CM

## 2025-03-25 DIAGNOSIS — N30.01 ACUTE CYSTITIS WITH HEMATURIA: ICD-10-CM

## 2025-03-25 PROCEDURE — 3075F SYST BP GE 130 - 139MM HG: CPT | Performed by: NURSE PRACTITIONER

## 2025-03-25 PROCEDURE — 99214 OFFICE O/P EST MOD 30 MIN: CPT | Performed by: NURSE PRACTITIONER

## 2025-03-25 PROCEDURE — 3079F DIAST BP 80-89 MM HG: CPT | Performed by: NURSE PRACTITIONER

## 2025-03-25 RX ORDER — AZITHROMYCIN 500 MG/1
TABLET, FILM COATED ORAL
COMMUNITY

## 2025-03-25 ASSESSMENT — FIBROSIS 4 INDEX: FIB4 SCORE: 0.71

## 2025-03-29 NOTE — PROGRESS NOTES
Subjective:     History of Present Illness  The patient is a 49-year-old male who presents for follow-up for lower abd pain.    He has been diagnosed with a bladder infection, initially suspected to be a prostate infection. A PCR test confirmed the presence of bacteria associated with a bladder infection. He was prescribed metronidazole, which he completed yesterday, and azithromycin, which he started on Friday. Despite his known allergy to ERYTHROMYCIN, he was informed that the only CDC-approved antibiotics for his condition were doxycycline and azithromycin. His culture revealed the presence of Gardnerella and Ureaplasma. He was advised that in the event of an allergic reaction to azithromycin, he would require daily intravenous antibiotics at the hospital for a month. He has not had any sexual intercourse for the past few months. He has been experiencing significant lower abdominal pain across the entire region for the past two days. He also reports urethral pain and was advised to continue azithromycin, with the expectation of symptom relief within a week. He experienced severe pain last night and this morning, accompanied by contraction-like sensations. He had diarrhea during the initial days of his treatment, which has since resolved. He also reports frequent urination, but notes a decrease in frequency over the past two days. He describes his current pain as reminiscent of his previous episodes of diverticulitis. He experiences contraction-like pains localized to his bladder, with soreness radiating bilaterally. He consumed coffee today, which slightly increased his urinary output and reduced his contractions. He is uncertain whether his symptoms are due to his bladder infection, potential diverticulitis, constipation, or gas. He reports no presence of blood or black tarry stools. He also reports no fever. He reports dysuria and intermittent testicular pain, with tenderness upon palpation. He has an upcoming  appointment with his urologist next Wednesday. He has been experiencing irritation on the glans and under the foreskin for the past year and a half, coinciding with the onset of his prostate infections.    He is currently taking vitamin D 5000 units daily.    He has been monitoring his blood pressure closely and has abstained from alcohol consumption since his last visit.    Supplemental Information  He received another injection in his arm from ortho on the same day as his last visit, which provided minimal relief.    SOCIAL HISTORY  He does not drink alcohol.    ALLERGIES  He is allergic to ERYTHROMYCIN.    MEDICATIONS  Current: Azithromycin, metronidazole, vitamin D      Results  - Laboratory Studies:    - Post-LYUDMILA testing showed presence of Gardnerella and Ureaplasma bacteria    - Vitamin D levels are low    - Chemistry normal    - Liver enzyme went back to normal    - Complement C3 and C4 for lupus normal    - Rheumatoid arthritis factor is normal    - Thyroid output and input are normal    - Filtration rate of kidneys is normal    Current medicines (including changes today)  Current Outpatient Medications   Medication Sig Dispense Refill    azithromycin (ZITHROMAX) 500 MG tablet TAKE 1 TABLET BY MOUTH EVERY DAY FOR 7 DAYS      ALPRAZolam (XANAX) 0.25 MG Tab Take 1 Tablet by mouth at bedtime as needed for Sleep for up to 30 days. 10 Tablet 0    omeprazole (PRILOSEC) 20 MG delayed-release capsule TAKE 1 CAPSULE BY MOUTH EVERY DAY 90 Capsule 3    olmesartan (BENICAR) 5 MG tablet Take 1 Tablet by mouth every day. 90 Tablet 1    sucralfate (CARAFATE) 1 GM Tab Take 1 Tablet by mouth 4 Times a Day,Before Meals and at Bedtime. 120 Tablet 0    meloxicam (MOBIC) 15 MG tablet TAKE 1 TABLET BY MOUTH EVERY DAY-WAIT TO TAKE UNTIL MEDROL DOSEPACK IS FINISHED      Cetirizine HCl (ZYRTEC PO)       tadalafil 20 MG tablet TAKE 1 TABLET BY MOUTH AS NEEDED FOR ERECTILE DYSFUNCTION 6 Tablet 11    tamsulosin (FLOMAX) 0.4 MG capsule  Take 1 capsule every day by oral route at bedtime for 90 days.      albuterol 108 (90 Base) MCG/ACT Aero Soln inhalation aerosol Inhale 2 Puffs every 6 hours as needed for Shortness of Breath. 8.5 g 0     No current facility-administered medications for this visit.     Current Outpatient Medications   Medication Sig Dispense Refill    azithromycin (ZITHROMAX) 500 MG tablet TAKE 1 TABLET BY MOUTH EVERY DAY FOR 7 DAYS      ALPRAZolam (XANAX) 0.25 MG Tab Take 1 Tablet by mouth at bedtime as needed for Sleep for up to 30 days. 10 Tablet 0    omeprazole (PRILOSEC) 20 MG delayed-release capsule TAKE 1 CAPSULE BY MOUTH EVERY DAY 90 Capsule 3    olmesartan (BENICAR) 5 MG tablet Take 1 Tablet by mouth every day. 90 Tablet 1    sucralfate (CARAFATE) 1 GM Tab Take 1 Tablet by mouth 4 Times a Day,Before Meals and at Bedtime. 120 Tablet 0    meloxicam (MOBIC) 15 MG tablet TAKE 1 TABLET BY MOUTH EVERY DAY-WAIT TO TAKE UNTIL MEDROL DOSEPACK IS FINISHED      Cetirizine HCl (ZYRTEC PO)       tadalafil 20 MG tablet TAKE 1 TABLET BY MOUTH AS NEEDED FOR ERECTILE DYSFUNCTION 6 Tablet 11    tamsulosin (FLOMAX) 0.4 MG capsule Take 1 capsule every day by oral route at bedtime for 90 days.      albuterol 108 (90 Base) MCG/ACT Aero Soln inhalation aerosol Inhale 2 Puffs every 6 hours as needed for Shortness of Breath. 8.5 g 0     No current facility-administered medications for this visit.       He  has a past medical history of Anxiety, ED (erectile dysfunction), Hypertension, LBP (low back pain), Meningitis, Post traumatic stress disorder (PTSD), and Recurrent sinusitis.    Microalbumin/creatinine Ratio:  Lab Results   Component Value Date/Time    URCREAT 131.00 03/11/2025 1146    MICROALBUR <1.2 03/11/2025 1146    MALBCRT see below 03/11/2025 1146       Lipid Panel:  Lab Results   Component Value Date/Time    CHOLSTRLTOT 125 03/11/2025 1147    TRIGLYCERIDE 137 03/11/2025 1147    LDL 66 03/11/2025 1147       Thyroid:  Lab Results    Component Value Date/Time    TSHULTRASEN 1.900 03/20/2025 1233     Lab Results   Component Value Date/Time    FREET4 1.25 03/20/2025 1233       Complete Blood Count:  Lab Results   Component Value Date/Time    WBC 3.8 (L) 03/11/2025 1147    RBC 4.68 (L) 03/11/2025 1147    HEMOGLOBIN 14.4 03/11/2025 1147    HEMATOCRIT 41.9 (L) 03/11/2025 1147    MCV 89.5 03/11/2025 1147    MCH 30.8 03/11/2025 1147    MCHC 34.4 03/11/2025 1147    RDW 42.1 03/11/2025 1147    MPV 9.3 03/11/2025 1147    LYMPHOCYTES 34.00 03/11/2025 1147    LYMPHS 1.28 03/11/2025 1147    MONOCYTES 11.90 03/11/2025 1147    MONOS 0.45 03/11/2025 1147    EOSINOPHILS 2.70 03/11/2025 1147    EOS 0.10 03/11/2025 1147    BASOPHILS 1.90 (H) 03/11/2025 1147    BASO 0.07 03/11/2025 1147    NRBC 0.00 03/11/2025 1147       Complete Metabolic Panel:  Lab Results   Component Value Date/Time    SODIUM 138 03/20/2025 12:33 PM    POTASSIUM 4.1 03/20/2025 12:33 PM    CHLORIDE 106 03/20/2025 12:33 PM    CO2 20 03/20/2025 12:33 PM    ANION 12.0 03/20/2025 12:33 PM    GLUCOSE 107 (H) 03/20/2025 12:33 PM    BUN 20 03/20/2025 12:33 PM    CREATININE 1.06 03/20/2025 12:33 PM    ASTSGOT 21 03/20/2025 12:33 PM    ALTSGPT 49 03/20/2025 12:33 PM    TBILIRUBIN 0.6 03/20/2025 12:33 PM    ALBUMIN 4.2 03/20/2025 12:33 PM    TOTPROTEIN 6.9 03/20/2025 12:33 PM    GLOBULIN 2.7 03/20/2025 12:33 PM    AGRATIO 1.6 03/20/2025 12:33 PM         Vitamin D:    Lab Results   Component Value Date/Time    25HYDROXY 28 (L) 03/20/2025 1233       GFR:    Lab Results   Component Value Date/Time    GFRCKD 87 03/20/2025 1233         ROS   Review of Systems   Constitutional: Negative.  Negative for fever, chills, weight loss, malaise/fatigue and diaphoresis.   HENT: Negative.  Negative for hearing loss, ear pain, nosebleeds, congestion, sore throat, neck pain, tinnitus and ear discharge.    Respiratory: Negative.  Negative for cough, hemoptysis, sputum production, shortness of breath, wheezing and stridor.  "   Cardiovascular: Negative.  Negative for chest pain, palpitations, orthopnea, claudication, leg swelling and PND.   Gastrointestinal: denies nausea, vomiting, diarrhea, constipation, heartburn, melena or hematochezia.  Genitourinary: Denies hematuria, urinary incontinence, frequency or urgency.    Musculoskeletal: Negative.  Negative for myalgias and back pain.   Neurological: Negative.  Negative for dizziness, tingling, tremors, weakness and headaches.   Psych:  Denies depression, anxiety or insomnia.  All other systems reviewed and are negative.       Objective:     /82   Pulse 87   Temp 37 °C (98.6 °F) (Temporal)   Ht 1.765 m (5' 9.5\")   Wt 104 kg (229 lb 4.5 oz)   SpO2 98%  Body mass index is 33.37 kg/m².   Physical Exam  Lungs were auscultated.  Heart was examined.  Abdomen was examined.  Physical Exam   Vitals reviewed.  Constitutional: oriented to person, place, and time. appears well-developed and well-nourished. No distress.   Neck: No JVD present.  Cardiovascular: Normal rate, regular rhythm, normal heart sounds and intact distal pulses.  Exam reveals no gallop and no friction rub.  No murmur heard.  No carotid bruits.   Pulmonary/Chest: Effort normal and breath sounds normal. No stridor. No respiratory distress. no wheezes or rales. exhibits no tenderness.   Musculoskeletal: Normal range of motion. exhibits no edema. otoniel pedal pulses 2+.  Lymphadenopathy: no cervical or supraclavicular adenopathy.   Abd:  No CVAT,  Soft,  Bs noted in all quadrants.  No HSM.  No abdominal tenderness.  Neurological: alert and oriented to person, place, and time. exhibits normal muscle tone. Coordination normal.   Skin: Skin is warm and dry. no diaphoresis.   Psychiatric: normal mood and affect. behavior is normal.   Assessment and Plan:   The following treatment plan was discussed    Assessment & Plan  1. Bladder infection with lower abd pain.  He is currently being treated for a yeast infection in the bladder " with azithromycin. The patient has been experiencing lower abdominal pain, which could be related to the bladder infection or diverticulitis. A stat CT scan of the abdomen and pelvis with contrast has been ordered to further investigate the cause of the pain. He is advised to maintain a clear liquid diet for the next 24 to 48 hours until symptoms improve. If symptoms worsen, he should contact urology and proceed with the CT scan.    2. Vitamin D deficiency.  His vitamin D levels remain low. He is advised to take 5000 units of vitamin D daily.    3. Blood pressure management.  His blood pressure readings have been within normal limits during this visit. He is advised to continue monitoring his blood pressure closely. If his blood pressure remains elevated as in the previous two visits, he should schedule a follow-up appointment.      ORDERS:  1. Lower abdominal pain    - CT-ABDOMEN-PELVIS WITH; Future    2. Gardnerella infection    - CT-ABDOMEN-PELVIS WITH; Future    3. Acute cystitis with hematuria    - CT-ABDOMEN-PELVIS WITH; Future        Please note that this dictation was created using voice recognition software. I have made every reasonable attempt to correct obvious errors, but I expect that there are errors of grammar and possibly content that I did not discover before finalizing the note.      Attestation      Verbal consent was acquired by the patient to use [employer] ambient listening note generation during this visit Yes

## 2025-04-21 ENCOUNTER — HOSPITAL ENCOUNTER (OUTPATIENT)
Facility: MEDICAL CENTER | Age: 50
End: 2025-04-21
Attending: NURSE PRACTITIONER
Payer: COMMERCIAL

## 2025-04-21 ENCOUNTER — OFFICE VISIT (OUTPATIENT)
Dept: MEDICAL GROUP | Facility: MEDICAL CENTER | Age: 50
End: 2025-04-21
Payer: COMMERCIAL

## 2025-04-21 VITALS
HEIGHT: 70 IN | WEIGHT: 231.48 LBS | DIASTOLIC BLOOD PRESSURE: 76 MMHG | BODY MASS INDEX: 33.14 KG/M2 | SYSTOLIC BLOOD PRESSURE: 136 MMHG | TEMPERATURE: 97 F | HEART RATE: 84 BPM | OXYGEN SATURATION: 97 %

## 2025-04-21 DIAGNOSIS — K59.00 CONSTIPATION, UNSPECIFIED CONSTIPATION TYPE: ICD-10-CM

## 2025-04-21 DIAGNOSIS — R14.0 ABDOMINAL BLOATING: ICD-10-CM

## 2025-04-21 DIAGNOSIS — Z87.440 PERSONAL HISTORY UTI: ICD-10-CM

## 2025-04-21 DIAGNOSIS — A49.8 GARDNERELLA INFECTION: ICD-10-CM

## 2025-04-21 DIAGNOSIS — R10.84 ABDOMINAL DISCOMFORT, GENERALIZED: ICD-10-CM

## 2025-04-21 LAB
APPEARANCE UR: CLEAR
BILIRUB UR STRIP-MCNC: NORMAL MG/DL
COLOR UR AUTO: YELLOW
GLUCOSE UR STRIP.AUTO-MCNC: NORMAL MG/DL
KETONES UR STRIP.AUTO-MCNC: NORMAL MG/DL
LEUKOCYTE ESTERASE UR QL STRIP.AUTO: NORMAL
NITRITE UR QL STRIP.AUTO: NORMAL
PH UR STRIP.AUTO: 7 [PH] (ref 5–8)
PROT UR QL STRIP: NORMAL MG/DL
RBC UR QL AUTO: NORMAL
SP GR UR STRIP.AUTO: 1.01
UROBILINOGEN UR STRIP-MCNC: 0.2 MG/DL

## 2025-04-21 PROCEDURE — 3078F DIAST BP <80 MM HG: CPT | Performed by: NURSE PRACTITIONER

## 2025-04-21 PROCEDURE — 99214 OFFICE O/P EST MOD 30 MIN: CPT | Performed by: NURSE PRACTITIONER

## 2025-04-21 PROCEDURE — 87086 URINE CULTURE/COLONY COUNT: CPT

## 2025-04-21 PROCEDURE — 81002 URINALYSIS NONAUTO W/O SCOPE: CPT | Performed by: NURSE PRACTITIONER

## 2025-04-21 PROCEDURE — 3075F SYST BP GE 130 - 139MM HG: CPT | Performed by: NURSE PRACTITIONER

## 2025-04-21 ASSESSMENT — FIBROSIS 4 INDEX: FIB4 SCORE: 0.71

## 2025-04-22 ENCOUNTER — TELEPHONE (OUTPATIENT)
Dept: MEDICAL GROUP | Facility: MEDICAL CENTER | Age: 50
End: 2025-04-22
Payer: COMMERCIAL

## 2025-04-22 NOTE — TELEPHONE ENCOUNTER
[facility] called stating that they are unable to run the Vaginal Path can't be ran on male patients

## 2025-04-22 NOTE — PROGRESS NOTES
Subjective:     History of Present Illness  The patient presents for evaluation of constipation, generalized abdominal discomfort, and bloating.    He was previously diagnosed with a bladder and prostate infection, for which he was prescribed azithromycin and Flagyl. He was given 3 days of flagyl only. However, he experienced generalized abdominal pain within a few days of starting the medication. A CT scan was ordered due to concerns about appendicitis but he has not done that test. He also consulted a urologist who attributed the sharp pains to bladder spasms, possibly due to inflammation, constipation, or gas from the infection or medication. He was advised to take a drink to help flush out his system and Senokot at night. He was not prescribed Pyridium for the bladder spasms. He had a follow-up with his urologist 8 days later, which was 3 weeks ago. During this time, he noticed slight daily improvement. Within a few days of the initial visit, the sharp pains subsided. He decided against the CT scan as the symptoms felt more like his previous diverticulitis.  He has noticed small white balls in his stool since starting tamsulosin for his prostate and is concerned about its impact on his diverticulitis. His tamsulosin dosage was recently increased to 2 pills at night.    Approximately 3 weeks ago, he began to feel normal again, although he still felt only 90% to 95% recovered. He occasionally experienced minor sensations in his bladder, suggesting the infection might not have completely resolved. Despite this, he felt well enough to resume his normal activities, including eating and drinking. About a week and a half ago, he attended a concert where he consumed beer and spicy food. At that time, he still felt bloated and slightly constipated. The day after the concert, his condition worsened, with increased spasms and pain, particularly in the colon area, reminiscent of his diverticulitis. He also experienced gas  and irregular bowel movements for several weeks, with thin stools when he did manage to go.    Last Monday, after a few days of these symptoms, he sought an appointment but none were available. He did not want to return to the urologist in case it was diverticulitis. He resumed taking Augmentin last Monday, hoping it would help with the diverticulitis. His condition has since improved slightly. He started using suppositories in the morning as he was hesitant to take anything orally. His bowel movements have improved slightly over the past two days, with more solid stools today. However, he still feels bloated and gassy. He has been on Augmentin for 7 days and has 2 more days left. He has been eating soft foods, with the exception of a few larger meals over the weekend. He reports no fevers or bleeding. He has been feeling very tired, even after 7 hours of sleep. He has noticed an increase in symptoms in the bladder and prostate area over the past few days. He has not scheduled an appointment with the urologist yet as he is unsure if the pain is due to pressure or constipation. He reports no pain or burning during urination but occasionally experiences a tingling sensation afterwards. He was previously on a 14-day course of Augmentin, which was switched after 5 days.    He has been experiencing worse anxiety since discontinuing Lexapro and his abd discomfort occurred but has not taken Xanax    Results  none    Current medicines (including changes today)  Current Outpatient Medications   Medication Sig Dispense Refill    azithromycin (ZITHROMAX) 500 MG tablet TAKE 1 TABLET BY MOUTH EVERY DAY FOR 7 DAYS      omeprazole (PRILOSEC) 20 MG delayed-release capsule TAKE 1 CAPSULE BY MOUTH EVERY DAY 90 Capsule 3    olmesartan (BENICAR) 5 MG tablet Take 1 Tablet by mouth every day. 90 Tablet 1    sucralfate (CARAFATE) 1 GM Tab Take 1 Tablet by mouth 4 Times a Day,Before Meals and at Bedtime. 120 Tablet 0    meloxicam (MOBIC)  15 MG tablet TAKE 1 TABLET BY MOUTH EVERY DAY-WAIT TO TAKE UNTIL MEDROL DOSEPACK IS FINISHED      Cetirizine HCl (ZYRTEC PO)       tadalafil 20 MG tablet TAKE 1 TABLET BY MOUTH AS NEEDED FOR ERECTILE DYSFUNCTION 6 Tablet 11    tamsulosin (FLOMAX) 0.4 MG capsule Take 1 capsule every day by oral route at bedtime for 90 days.      albuterol 108 (90 Base) MCG/ACT Aero Soln inhalation aerosol Inhale 2 Puffs every 6 hours as needed for Shortness of Breath. 8.5 g 0     No current facility-administered medications for this visit.     Current Outpatient Medications   Medication Sig Dispense Refill    azithromycin (ZITHROMAX) 500 MG tablet TAKE 1 TABLET BY MOUTH EVERY DAY FOR 7 DAYS      omeprazole (PRILOSEC) 20 MG delayed-release capsule TAKE 1 CAPSULE BY MOUTH EVERY DAY 90 Capsule 3    olmesartan (BENICAR) 5 MG tablet Take 1 Tablet by mouth every day. 90 Tablet 1    sucralfate (CARAFATE) 1 GM Tab Take 1 Tablet by mouth 4 Times a Day,Before Meals and at Bedtime. 120 Tablet 0    meloxicam (MOBIC) 15 MG tablet TAKE 1 TABLET BY MOUTH EVERY DAY-WAIT TO TAKE UNTIL MEDROL DOSEPACK IS FINISHED      Cetirizine HCl (ZYRTEC PO)       tadalafil 20 MG tablet TAKE 1 TABLET BY MOUTH AS NEEDED FOR ERECTILE DYSFUNCTION 6 Tablet 11    tamsulosin (FLOMAX) 0.4 MG capsule Take 1 capsule every day by oral route at bedtime for 90 days.      albuterol 108 (90 Base) MCG/ACT Aero Soln inhalation aerosol Inhale 2 Puffs every 6 hours as needed for Shortness of Breath. 8.5 g 0     No current facility-administered medications for this visit.       He  has a past medical history of Anxiety, ED (erectile dysfunction), Hypertension, LBP (low back pain), Meningitis, Post traumatic stress disorder (PTSD), and Recurrent sinusitis.    ROS   Review of Systems   Constitutional: Negative.  Negative for fever, chills, weight loss, malaise/fatigue and diaphoresis.   HENT: Negative.  Negative for hearing loss, ear pain, nosebleeds, congestion, sore throat, neck  "pain, tinnitus and ear discharge.    Respiratory: Negative.  Negative for cough, hemoptysis, sputum production, shortness of breath, wheezing and stridor.    Cardiovascular: Negative.  Negative for chest pain, palpitations, orthopnea, cconstipation, heartburn, melena or hematochezia.  Genitourinary: Denies dysuria, hematuria, urinary incontinence, frequency or urgency.    Musculoskeletal: Negative.  Negative for myalgias and back pain.   Neurological: Negative.  Negative for dizziness, tingling, tremors, weakness and headaches.   Psych:  Denies depression, anxiety or insomnia.  All other systems reviewed and are negative.       Objective:     /76   Pulse 84   Temp 36.1 °C (97 °F) (Temporal)   Ht 1.765 m (5' 9.5\")   Wt 105 kg (231 lb 7.7 oz)   SpO2 97%  Body mass index is 33.69 kg/m².   Physical Exam  Respiratory: Clear to auscultation, no wheezing, rales or rhonchi  Gastrointestinal: Bowel sounds present, no tenderness on palpation  Physical Exam   Vitals reviewed.  Constitutional: oriented to person, place, and time. appears well-developed and well-nourished. No distress.   Neck: No JVD present.  Cardiovascular: Normal rate, regular rhythm, normal heart sounds and intact distal pulses.  Exam reveals no gallop and no friction rub.  No murmur heard.  No carotid bruits.   Pulmonary/Chest: Effort normal and breath sounds normal. No stridor. No respiratory distress. no wheezes or rales. exhibits no tenderness.   Musculoskeletal: Normal range of motion. exhibits no edema. otoniel pedal pulses 2+.  Lymphadenopathy: no cervical or supraclavicular adenopathy.   Abd:  No CVAT,  Soft,  Bs noted in all quadrants.  No HSM.  No abdominal tenderness.  Neurological: alert and oriented to person, place, and time. exhibits normal muscle tone. Coordination normal.   Skin: Skin is warm and dry. no diaphoresis.   Psychiatric: normal mood and affect. behavior is normal.   UA in office: wnl  Affirm done today in " office    Assessment and Plan:   The following treatment plan was discussed    Assessment & Plan  1. Constipation.  - Reports difficulty with bowel movements and feeling bloated.  - Advised to continue Augmentin for an additional 2 days to complete a full 10-day course.  - Prescription for 2 extra tablets of Augmentin will be provided.  - Advised to maintain a liquid diet for the next 2 days to allow the bowel to rest.    2. Generalized abdominal discomfort and bloating.  - Reports generalized abdominal discomfort and bloating, potentially related to recent bladder and prostate infection or medication use.  - Advised to continue monitoring symptoms and follow dietary recommendations provided.  - No tenderness noted on physical examination.    3. Suspected urinary tract infection vs reoccurring gardnerella infection.  - Reports occasional tingling after urination and a sensation of incomplete bladder emptying.  - Urinalysis will be conducted today to check for infection.  - If urinalysis is abnormal, treatment will be initiated; if normal, a penile culture will be obtained to check for yeast or Gardnerella.    4. Anxiety.  - Reports increased anxiety after weaning off Lexapro but has not taken Xanax.  - Encouraged to continue using other techniques to manage anxiety and use Xanax as a last resort.  - No recent use of Xanax noted.    Follow-up: [next scheduled visit, other. Remove this row and header if blank].      ORDERS:  1. Abdominal discomfort, generalized    - POCT Urinalysis  - VAGINAL PATHOGENS DNA PANEL; Future  - URINE CULTURE(NEW); Future    2. Abdominal bloating    - POCT Urinalysis  - VAGINAL PATHOGENS DNA PANEL; Future  - URINE CULTURE(NEW); Future    3. Constipation, unspecified constipation type    - POCT Urinalysis  - VAGINAL PATHOGENS DNA PANEL; Future  - URINE CULTURE(NEW); Future    4. Gardnerella infection    - VAGINAL PATHOGENS DNA PANEL; Future    5. Personal history UTI    - URINE CULTURE(NEW);  Future        Please note that this dictation was created using voice recognition software. I have made every reasonable attempt to correct obvious errors, but I expect that there are errors of grammar and possibly content that I did not discover before finalizing the note.      Attestation      Verbal consent was acquired by the patient to use [employer] ambient listening note generation during this visit Yes

## 2025-04-24 ENCOUNTER — RESULTS FOLLOW-UP (OUTPATIENT)
Dept: MEDICAL GROUP | Facility: MEDICAL CENTER | Age: 50
End: 2025-04-24

## 2025-04-27 LAB
BACTERIA UR CULT: NORMAL
SIGNIFICANT IND 70042: NORMAL
SITE SITE: NORMAL
SOURCE SOURCE: NORMAL

## 2025-07-31 ENCOUNTER — OFFICE VISIT (OUTPATIENT)
Dept: MEDICAL GROUP | Facility: MEDICAL CENTER | Age: 50
End: 2025-07-31
Payer: COMMERCIAL

## 2025-07-31 VITALS
WEIGHT: 231.48 LBS | DIASTOLIC BLOOD PRESSURE: 78 MMHG | BODY MASS INDEX: 33.14 KG/M2 | HEIGHT: 70 IN | OXYGEN SATURATION: 97 % | HEART RATE: 93 BPM | TEMPERATURE: 98.2 F | SYSTOLIC BLOOD PRESSURE: 138 MMHG

## 2025-07-31 DIAGNOSIS — I10 PRIMARY HYPERTENSION: ICD-10-CM
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
## 2025-07-31 DIAGNOSIS — F41.1 GENERALIZED ANXIETY DISORDER: ICD-10-CM

## 2025-07-31 RX ORDER — DULOXETIN HYDROCHLORIDE 30 MG/1
30 CAPSULE, DELAYED RELEASE ORAL DAILY
Qty: 30 CAPSULE | Refills: 1 | Status: SHIPPED | OUTPATIENT
Start: 2025-07-31

## 2025-07-31 ASSESSMENT — FIBROSIS 4 INDEX: FIB4 SCORE: 0.72

## 2025-07-31 NOTE — PROGRESS NOTES
Subjective:     History of Present Illness  The patient presents for follow-up for anxiety    He has been experiencing red spots on his foreskin, particularly where it meets the glans and slightly on the bottom of the glans. These spots have been intermittent but have become more persistent over the past year, appearing blotchy and flat. He describes them as resembling unhealed skin. Despite daily showers and cleanliness, the condition persists. He wonders if it could be an allergic reaction to tamsulosin, which he takes daily. The spots occasionally lighten but can become cracked and white when the skin is stretched. There is no itching or drainage, but the skin peels when stretched. The condition worsens with sexual activity. He has tried changing his bath soap 3 times to find one without perfumes or allergens, and he uses a dye-free laundry soap. He has attempted self-treatment with Vaseline, Aquaphor, and Lotrimin, suspecting a fungal cause. His urologist previously prescribed nystatin and triamcinolone.    He has been struggling with plantar fascititis and has been under the care of a podiatrist. He has received 3 cortisone injections and undergone several rounds of steroids. He saw his podiatrist yesterday morning and was unable to bear weight on his foot until he started walking around. His podiatrist initiated Plan B yesterday, which involves starting physical therapy. A referral was placed for physical therapy. He was also given a lotion to apply to his foot and prescribed Celebrex for inflammation and pain. Physical therapy is the next step before considering surgery. He has been unable to hike and has not gained or lost any weight. He is planning a 3-day hiking trip in 2 weeks and is concerned about managing his pain during this time.    He discontinued Lexapro approximately 6 months ago due to side effects such as increased depression, fatigue, and nausea during physical activity. However, he has been  experiencing severe anxiety, which keeps him awake until 2 or 3 in the morning. His anxiety is exacerbated by various stressors, including his girlfriend's potential move, his paranoia about cybersecurity, and even minor incidents like receiving [employer] emails or unusual Facebook notifications. He feels physically ill from these stressors and is open to trying new medications. He has tried several medications over the years, including BuSpar, Lexapro, Effexor, Zoloft, and citalopram. He was on BuSpar in 2020 but did not take it this year. He reports that Lexapro made him nauseous at work, but the nausea subsided after discontinuing the medication.    He has been dealing with insect bites, which he suspects are from mosquitoes or spiders in his apartment. He wakes up with bites a few times a week, which are extremely itchy. He has considered other causes such as bed bugs or dermatitis but has not found any evidence of these in his bedding. He has been using Zevo plug-ins for 2 weeks and has caught 2 small insects, but no mosquitoes or flies. He plans to have his apartment sprayed for insects.    Social History:  Sleep: He reports difficulty sleeping due to anxiety, often staying awake until 2 or 3 in the morning.  Living Condition: He lives in an apartment that he describes as having issues with bugs and spiders.    Results  none      Current medicines (including changes today)  Current Medications[1]  Current Medications[2]    He  has a past medical history of Anxiety, ED (erectile dysfunction), Hypertension, LBP (low back pain), Meningitis, Post traumatic stress disorder (PTSD), and Recurrent sinusitis.      ROS   Review of Systems   Constitutional: Negative.  Negative for fever, chills, weight loss, malaise/fatigue and diaphoresis.   HENT: Negative.  Negative for hearing loss, ear pain, nosebleeds, congestion, sore throat, neck pain, tinnitus and ear discharge.    Respiratory: Negative.  Negative for cough,  "hemoptysis, sputum production, shortness of breath, wheezing and stridor.    Cardiovascular: Negative.  Negative for chest pain, palpitations, orthopnea, claudication, leg swelling and PND.   Gastrointestinal: denies nausea, vomiting, diarrhea, constipation, heartburn, melena or hematochezia.  Genitourinary: Denies dysuria, hematuria, urinary incontinence, frequency or urgency.    Musculoskeletal: Negative.  Negative for myalgias and back pain.   Neurological: Negative.  Negative for dizziness, tingling, tremors, weakness and headaches.   Psych:  Denies depression, insomnia.  All other systems reviewed and are negative.       Objective:     /78   Pulse 93   Temp 36.8 °C (98.2 °F) (Temporal)   Ht 1.765 m (5' 9.5\")   Wt 105 kg (231 lb 7.7 oz)   SpO2 97%  Body mass index is 33.69 kg/m².   Physical Exam  Respiratory: Clear to auscultation, no wheezing, rales or rhonchi  Cardiovascular: Regular rate and rhythm, no murmurs, rubs, or gallops  Extremities: Good pulse, good circulation  Skin: Red spots on foreskin and glans, flat and blotchy, no drainage, no itching  Physical Exam   Vitals reviewed.  Constitutional: oriented to person, place, and time. appears well-developed and well-nourished. No distress.   Neck: No JVD present.  Cardiovascular: Normal rate, regular rhythm, normal heart sounds and intact distal pulses.  Exam reveals no gallop and no friction rub.  No murmur heard.  No carotid bruits.   Pulmonary/Chest: Effort normal and breath sounds normal. No stridor. No respiratory distress. no wheezes or rales. exhibits no tenderness.   Musculoskeletal: Normal range of motion. exhibits no edema. otoniel pedal pulses 2+.  Lymphadenopathy: no cervical or supraclavicular adenopathy.   Neurological: alert and oriented to person, place, and time. exhibits normal muscle tone. Coordination normal.   Skin: Skin is warm and dry. no diaphoresis.   Psychiatric: normal mood and affect. behavior is normal.       Assessment " and Plan:   The following treatment plan was discussed    Assessment & Plan  1. Elevated blood pressure  Blood pressure readings are slightly elevated today, with systolic values bordering on high. Heart rate, temperature, and respiratory function are all within normal ranges. Advised to monitor blood pressure closely over the next month. Continue to monitor and manage blood pressure.    2. Skin lesions  Lesions do not appear to be warts or spider bites; could potentially be a form of dermatitis. Physical examination reveals flat, blotchy red spots on the foreskin. Referral to dermatology will be made for further evaluation; scraping or biopsy may be performed if necessary. Dermatology referral to be initiated for further assessment.    3. Anxiety  Previously tried Lexapro, BuSpar, Effexor, Zoloft, and citalopram; Lexapro discontinued due to side effects such as nausea and increased depression. Anxiety has been exacerbated by current life stressors and lack of sleep. Prescription for Cymbalta 30 mg will be provided to manage anxiety and pain. Cymbalta 30 mg prescribed; follow-up in 1 month to assess effectiveness.    4. Plantar fasciitis  Advised to continue with physical therapy and use the prescribed Celebrex for inflammation and pain management. Physical therapy referral has been initiated; Celebrex prescribed for inflammation and pain. Consider using custom orthotics as recommended by the podiatrist. Continue with current treatment plan and consider custom orthotics.    5. Insect bites  Recurring insect bites noted; advised to have the apartment sprayed for insects. Physical examination reveals multiple itchy bites consistent with insect bites. Discussed potential sources and preventive measures for insect bites. Advised to contact apartment management for professional insect spraying.    Follow-up: A follow-up appointment is scheduled for 1 month from now.      ORDERS:  1. Insect bite, unspecified site, initial  encounter (Primary)    - Referral to Dermatology    2. Generalized anxiety disorder    - DULoxetine (CYMBALTA) 30 MG Cap DR Particles; Take 1 Capsule by mouth every day.  Dispense: 30 Capsule; Refill: 1        Please note that this dictation was created using voice recognition software. I have made every reasonable attempt to correct obvious errors, but I expect that there are errors of grammar and possibly content that I did not discover before finalizing the note.      Attestation      Verbal consent was acquired by the patient to use [employer] ambient listening note generation during this visit Yes                  [1]   Current Outpatient Medications   Medication Sig Dispense Refill    DULoxetine (CYMBALTA) 30 MG Cap DR Particles Take 1 Capsule by mouth every day. 30 Capsule 1    omeprazole (PRILOSEC) 20 MG delayed-release capsule TAKE 1 CAPSULE BY MOUTH EVERY DAY 90 Capsule 3    olmesartan (BENICAR) 5 MG tablet Take 1 Tablet by mouth every day. 90 Tablet 1    sucralfate (CARAFATE) 1 GM Tab Take 1 Tablet by mouth 4 Times a Day,Before Meals and at Bedtime. 120 Tablet 0    meloxicam (MOBIC) 15 MG tablet TAKE 1 TABLET BY MOUTH EVERY DAY-WAIT TO TAKE UNTIL MEDROL DOSEPACK IS FINISHED      Cetirizine HCl (ZYRTEC PO)       tadalafil 20 MG tablet TAKE 1 TABLET BY MOUTH AS NEEDED FOR ERECTILE DYSFUNCTION 6 Tablet 11    tamsulosin (FLOMAX) 0.4 MG capsule Take 1 capsule every day by oral route at bedtime for 90 days.      albuterol 108 (90 Base) MCG/ACT Aero Soln inhalation aerosol Inhale 2 Puffs every 6 hours as needed for Shortness of Breath. 8.5 g 0     No current facility-administered medications for this visit.   [2]   Current Outpatient Medications   Medication Sig Dispense Refill    DULoxetine (CYMBALTA) 30 MG Cap DR Particles Take 1 Capsule by mouth every day. 30 Capsule 1    omeprazole (PRILOSEC) 20 MG delayed-release capsule TAKE 1 CAPSULE BY MOUTH EVERY DAY 90 Capsule 3    olmesartan (BENICAR) 5 MG tablet  Take 1 Tablet by mouth every day. 90 Tablet 1    sucralfate (CARAFATE) 1 GM Tab Take 1 Tablet by mouth 4 Times a Day,Before Meals and at Bedtime. 120 Tablet 0    meloxicam (MOBIC) 15 MG tablet TAKE 1 TABLET BY MOUTH EVERY DAY-WAIT TO TAKE UNTIL MEDROL DOSEPACK IS FINISHED      Cetirizine HCl (ZYRTEC PO)       tadalafil 20 MG tablet TAKE 1 TABLET BY MOUTH AS NEEDED FOR ERECTILE DYSFUNCTION 6 Tablet 11    tamsulosin (FLOMAX) 0.4 MG capsule Take 1 capsule every day by oral route at bedtime for 90 days.      albuterol 108 (90 Base) MCG/ACT Aero Soln inhalation aerosol Inhale 2 Puffs every 6 hours as needed for Shortness of Breath. 8.5 g 0     No current facility-administered medications for this visit.

## 2025-08-25 DIAGNOSIS — F41.1 GENERALIZED ANXIETY DISORDER: ICD-10-CM

## 2025-08-25 RX ORDER — DULOXETIN HYDROCHLORIDE 30 MG/1
30 CAPSULE, DELAYED RELEASE ORAL DAILY
Qty: 90 CAPSULE | Refills: 3 | Status: SHIPPED | OUTPATIENT
Start: 2025-08-25

## 2025-09-10 ENCOUNTER — APPOINTMENT (OUTPATIENT)
Dept: MEDICAL GROUP | Facility: MEDICAL CENTER | Age: 50
End: 2025-09-10
Payer: COMMERCIAL